# Patient Record
Sex: MALE | Race: AMERICAN INDIAN OR ALASKA NATIVE | NOT HISPANIC OR LATINO | Employment: FULL TIME | URBAN - METROPOLITAN AREA
[De-identification: names, ages, dates, MRNs, and addresses within clinical notes are randomized per-mention and may not be internally consistent; named-entity substitution may affect disease eponyms.]

---

## 2017-08-10 ENCOUNTER — SLEEP CENTER VISIT (OUTPATIENT)
Dept: SLEEP MEDICINE | Facility: MEDICAL CENTER | Age: 62
End: 2017-08-10
Payer: COMMERCIAL

## 2017-08-10 VITALS
WEIGHT: 277 LBS | OXYGEN SATURATION: 92 % | DIASTOLIC BLOOD PRESSURE: 85 MMHG | HEIGHT: 67 IN | BODY MASS INDEX: 43.47 KG/M2 | SYSTOLIC BLOOD PRESSURE: 140 MMHG | RESPIRATION RATE: 15 BRPM | HEART RATE: 62 BPM

## 2017-08-10 DIAGNOSIS — G47.33 OSA (OBSTRUCTIVE SLEEP APNEA): ICD-10-CM

## 2017-08-10 DIAGNOSIS — E78.5 DYSLIPIDEMIA: ICD-10-CM

## 2017-08-10 DIAGNOSIS — I10 ESSENTIAL HYPERTENSION: ICD-10-CM

## 2017-08-10 PROCEDURE — 99213 OFFICE O/P EST LOW 20 MIN: CPT | Performed by: NURSE PRACTITIONER

## 2017-08-10 NOTE — PATIENT INSTRUCTIONS
1. Order for pressure change to [facility]  2. Order for new mask and supplies to [facility]  3. Follow-up in 3 months for compliance download, sooner if necessary  4. Continue routine exercise and diet  5. Adjust humidification on CPAP to comfort and tried Biotene over-the-counter for dry mouth

## 2017-08-10 NOTE — PROGRESS NOTES
Chief Complaint   Patient presents with   • Follow-Up     6 week         HPI: This patient is a 61 y.o. male, who presents for annual follow-up of DEREK. Medical history includes obesity, BMI 43, HTN, HLD, asthma, former smoker, 30 pack year history quit in 2001.    In regards to DEREK, PSG indicates severe sleep apnea with an AHI of 49.7, minimum oxygen saturation of 76 %. He is compliant with CPAP 12 cm H2O. Compliance download over the past 30 days indicates 100 % compliance, average use of 6 hours 10 minutes per night, AHI of 2.4. Patient feels pressures are too low at times. He denies resuscitative gasps or snorts. He would like to try an nasal mask. He requires order for new supplies. He complains of dry mouth. He recently had a visit with his PCP. His BP meds were increased. He says other blood work was unremarkable. Actively trying to lose weight with diet and bicycling. He understands that if he loses weight he may be able to discontinue CPAP. He feels well rested and benefits from CPAP therapy.    In regards to asthma, he uses Asmanex twice a day, rarely requires use of albuterol. Denies URIs. Denies dyspnea, cough or wheeze.    Past Medical History   Diagnosis Date   • Sleep apnea    • Chickenpox    • [place] measles    • Influenza    • Mumps        Social History   Substance Use Topics   • Smoking status: Former Smoker -- 1.00 packs/day for 30 years     Types: Cigarettes     Quit date: 01/01/2001   • Smokeless tobacco: Never Used   • Alcohol Use: No       Family History   Problem Relation Age of Onset   • Sleep Apnea Brother        Current medications as of today   Current Outpatient Prescriptions   Medication Sig Dispense Refill   • MELOXICAM PO Take  by mouth.     • Albuterol Sulfate 108 (90 BASE) MCG/ACT AEROSOL POWDER, BREATH ACTIVATED Inhale  by mouth.     • aspirin 81 MG tablet Take 81 mg by mouth every day.     • losartan (COZAAR) 50 MG Tab Take 100 mg by mouth every day.     • lovastatin (MEVACOR) 40  "MG tablet Take 40 mg by mouth every evening.     • mometasone (ASMANEX) 220 MCG/INH inhaler Inhale 2 Puffs by mouth every day.     • losartan (COZAAR) 25 MG Tab Take 25 mg by mouth every day.     • zolpidem (AMBIEN) 5 MG Tab 1-3 tab PO as needed for sleep during sleep study 3 Tab 0     No current facility-administered medications for this visit.       Allergies: Review of patient's allergies indicates no known allergies.    Blood pressure 140/85, pulse 62, resp. rate 15, height 1.702 m (5' 7\"), weight 125.646 kg (277 lb), SpO2 92 %.      ROS:   Constitutional: Denies fevers, chills, night sweats, weight loss or fatigue  Eyes: Denies pain, discharge/drainage  ENT: Denies tinnitus, hearing loss, sinusitis, hoarseness, epistaxis. Positive dry mouth.  Allergic: Denies Allergic rhinitis or hayfever  Respiratory: Denies cough, wheeze, dyspnea, hemoptysis  Cardiovascular: Denies chest pain, tightness, palpitations, orthopnea or edema  Sleep: See HPI  GI/: Denies heartburn, nausea, vomiting, urinary incontinence, hematuria  Musculoskeletal: Denies back pain, painful joints, sore muscles  Neurological: Denies vertigo or headaches  Skin: Denies rashes, lesions  Psychiatric: Denies depression or anxiety    Physical exam:   Constitutional: Obese, in no acute distress  Eyes: PERRLA  Mouth/Throat: Oropharynx is moist, clear, no lesions, Mallampati 2  Neck: supple, no masses  Respiratory: no intercostal retractions or accessory muscle use   Lungs auscultation: Diminished breath sounds bilateral bases, otherwise clear   Cardiovascular: Regular rate rhythm no murmurs, rubs or gallops  Musculoskeletal: Normal gait, no clubbing or cyanosis  Skin: No rashes or lesions  Neuro: No focal deficit, cranial nerves grossly intact  Psychiatric: Oriented to time, person and place.     Diagnosis:  1. DEREK (obstructive sleep apnea)  DME MASK AND SUPPLIES    DME OTHER   2. Essential hypertension     3. Dyslipidemia     4. BMI 40.0-44.9, adult " (CMS-[facility])         Plan:    1. Order for pressure change to [facility] increased to 14 cm H2O  2. Order for new mask and supplies to [facility], patient would like to try nasal mask with chinstrap  3. Follow-up in 3 months for compliance download, sooner if necessary  4. Continue routine exercise and diet  5. Adjust humidification on CPAP to comfort and tried Biotene over-the-counter for dry mouth

## 2017-08-10 NOTE — MR AVS SNAPSHOT
"        Edwin Hammond   8/10/2017 8:00 AM   Sleep Center Visit   MRN: [id number]    Department:  [facility]   Dept Phone:  [phone]    Description:  Male : 1955   Provider:  LEIGH Zepeda           Reason for Visit     Follow-Up 6 week      Allergies as of 8/10/2017     No Known Allergies      You were diagnosed with     DEREK (obstructive sleep apnea)   [479450]       Essential hypertension   [7352461]       Dyslipidemia   [598163]       BMI 40.0-44.9, adult (CMS-HCC)   [043991]         Vital Signs     Blood Pressure Pulse Respirations Height Weight Body Mass Index    140/85 mmHg 62 15 1.702 m (5' 7\") 125.646 kg (277 lb) 43.37 kg/m2    Oxygen Saturation Smoking Status                92% Former Smoker          Basic Information     Date Of Birth Sex Race Ethnicity Preferred Language    1955 Male  or  Non- English      Your appointments     2017 10:00 AM   Follow UP with LEIGH Zepeda   [facility] (--)    [address] [id number] [phone]              Problem List              ICD-10-CM Priority Class Noted - Resolved    Mild intermittent asthma without complication J45.20   2016 - Present    Dyslipidemia E78.5   2016 - Present    Essential hypertension I10   2016 - Present    DEREK (obstructive sleep apnea) G47.33   2016 - Present    BMI 40.0-44.9, adult (CMS-HCC) Z68.41   8/10/2017 - Present      Health Maintenance        Date Due Completion Dates    IMM DTaP/Tdap/Td Vaccine (1 - Tdap) 1974 ---    IMM PNEUMOCOCCAL 19-64 (ADULT) MEDIUM RISK SERIES (1 of 1 - PPSV23) 1974 ---    COLONOSCOPY 2005 ---    IMM ZOSTER VACCINE 2015 ---    IMM INFLUENZA (1) 2017 ---            Current Immunizations     No immunizations on file.      Below and/or attached are the medications your provider expects you to take. Review all of your " home medications and newly ordered medications with your provider and/or pharmacist. Follow medication instructions as directed by your provider and/or pharmacist. Please keep your medication list with you and share with your provider. Update the information when medications are discontinued, doses are changed, or new medications (including over-the-counter products) are added; and carry medication information at all times in the event of emergency situations     Allergies:  No Known Allergies          Medications  Valid as of: August 10, 2017 -  8:26 AM    Generic Name Brand Name Tablet Size Instructions for use    Albuterol Sulfate (AEROSOL POWDER, BREATH ACTIVATED) Albuterol Sulfate 108 (90 BASE) MCG/ACT Inhale  by mouth.        Aspirin (Tab) aspirin 81 MG Take 81 mg by mouth every day.        Losartan Potassium (Tab) COZAAR 50 MG Take 100 mg by mouth every day.        Losartan Potassium (Tab) COZAAR 25 MG Take 25 mg by mouth every day.        Lovastatin (Tab) MEVACOR 40 MG Take 40 mg by mouth every evening.        Meloxicam   Take  by mouth.        Mometasone Furoate (AEROSOL POWDER, BREATH ACTIVATED) ASMANEX 220 MCG/INH Inhale 2 Puffs by mouth every day.        Zolpidem Tartrate (Tab) AMBIEN 5 MG 1-3 tab PO as needed for sleep during sleep study        .                 Medicines prescribed today were sent to:     None      Medication refill instructions:       If your prescription bottle indicates you have medication refills left, it is not necessary to call your provider’s office. Please contact your pharmacy and they will refill your medication.    If your prescription bottle indicates you do not have any refills left, you may request refills at any time through one of the following ways: The online [employer] system (except Urgent Care), by calling your provider’s office, or by asking your pharmacy to contact your provider’s office with a refill request. Medication refills are processed only during regular  business hours and may not be available until the next business day. Your provider may request additional information or to have a follow-up visit with you prior to refilling your medication.   *Please Note: Medication refills are assigned a new Rx number when refilled electronically. Your pharmacy may indicate that no refills were authorized even though a new prescription for the same medication is available at the pharmacy. Please request the medicine by name with the pharmacy before contacting your provider for a refill.        Instructions    1. Order for pressure change to [facility]  2. Order for new mask and supplies to [facility]  3. Follow-up in 3 months for compliance download, sooner if necessary  4. Continue routine exercise and diet  5. Adjust humidification on CPAP to comfort and tried Biotene over-the-counter for dry mouth          MyChart Status: Patient Declined

## 2017-11-09 ENCOUNTER — APPOINTMENT (OUTPATIENT)
Dept: SLEEP MEDICINE | Facility: MEDICAL CENTER | Age: 62
End: 2017-11-09
Payer: COMMERCIAL

## 2022-01-19 ENCOUNTER — NON-PROVIDER VISIT (OUTPATIENT)
Dept: URGENT CARE | Facility: PHYSICIAN GROUP | Age: 67
End: 2022-01-19
Payer: COMMERCIAL

## 2022-01-19 DIAGNOSIS — Z02.1 PRE-EMPLOYMENT DRUG SCREENING: ICD-10-CM

## 2022-01-19 LAB
AMP AMPHETAMINE: NORMAL
COC COCAINE: NORMAL
INT CON NEG: NORMAL
INT CON POS: NORMAL
MET METHAMPHETAMINES: NORMAL
OPI OPIATES: NORMAL
PCP PHENCYCLIDINE: NORMAL
POC DRUG COMMENT 753798-OCCUPATIONAL HEALTH: NORMAL
THC: NORMAL

## 2022-01-19 PROCEDURE — 80305 DRUG TEST PRSMV DIR OPT OBS: CPT | Performed by: FAMILY MEDICINE

## 2022-04-19 ENCOUNTER — OFFICE VISIT (OUTPATIENT)
Dept: SLEEP MEDICINE | Facility: MEDICAL CENTER | Age: 67
End: 2022-04-19
Payer: MEDICARE

## 2022-04-19 VITALS
HEIGHT: 68 IN | BODY MASS INDEX: 38.04 KG/M2 | SYSTOLIC BLOOD PRESSURE: 132 MMHG | HEART RATE: 68 BPM | DIASTOLIC BLOOD PRESSURE: 84 MMHG | RESPIRATION RATE: 16 BRPM | OXYGEN SATURATION: 96 % | WEIGHT: 251 LBS

## 2022-04-19 DIAGNOSIS — G47.33 OSA (OBSTRUCTIVE SLEEP APNEA): ICD-10-CM

## 2022-04-19 PROCEDURE — 99203 OFFICE O/P NEW LOW 30 MIN: CPT | Performed by: FAMILY MEDICINE

## 2022-04-19 RX ORDER — AMLODIPINE BESYLATE 5 MG/1
10 TABLET ORAL DAILY
COMMUNITY

## 2022-04-19 ASSESSMENT — PATIENT HEALTH QUESTIONNAIRE - PHQ9
CLINICAL INTERPRETATION OF PHQ2 SCORE: 1
5. POOR APPETITE OR OVEREATING: 0 - NOT AT ALL
SUM OF ALL RESPONSES TO PHQ QUESTIONS 1-9: 7

## 2022-04-19 NOTE — PROGRESS NOTES
"  [facility]  Consult Note     Date: 4/19/2022 / Time: 9:19 AM    Patient ID:   Name:             Edwin Hammond   YOB: 1955  Age:                 66 y.o.  male   MRN:               [id number]      Thank you for requesting a sleep medicine consultation on Edwin Hammond at the sleep center.He presents today with the chief complaints of DEREK and to establish as a new pt. Pt was diagnosed with DEREK on 6/7/2016 and was started on a CPAP.  However he has been off of the CPAP for last 2 years due to dry mouth.The initial presenting symptoms were loud snoring and poor quality of sleep. His chief Complaint today is history of sleep apnea, loud snoring, restless leg and poor sleep quality. He is referred by  for evaluation and treatment of sleep disorder breathing .     HISTORY OF PRESENT ILLNESS:       At night,  Edwin Hammond goes to bed around 8 pm on weekdays and around 9 pm on the weekends. He gets out of bed at 6:30 am on weekdays and at 9-10 am on the weekends. He averages about 6 hrs of sleep on a good night and 5 hrs on a bad night.  He falls asleep within few minutes. He wakes up about 2-3 times during the night due to bathroom use and on average It takes him couple min to fall back asleep.     He is aware of snoring but unsure about breathing pauses/gasping or choking in sleep.  He  denies any symptoms of restless legs syndrome such as an \"urge to move\"  He  legs in the evening or bedtime. He  denies any symptoms of narcolepsy such as sleep paralysis or cataplexy, or any symptoms to suggest parasomnias such as sleep walking or acting out of dreams. He  has not used any medications for the sleep problem.    Overall, he doesnot finds his sleep refreshing. In terms of  excessive daytime sleepiness,  He complains of sleepiness while  at work, while reading or watching TV and occasionally while driving. [place] sleepiness scale score is 16/24.He does take regular naps. The " naps are usually 60-90 min long.He drinks about 1 caffeinated beverages per day.    Medical history includes obesity, BMI 43, HTN, HLD, asthma, former smoker, 30 pack year history quit in 2001.  Based on his previous split-night study he had Severe DEREK, with overall AHI:49.7, associated with desaturations for 5% of the night, to a keo of 76%. REM rebound noted on CPAP, with successful titration at 12 cm H20, with resultant AHI:0 and normal oximetry.  He was started on CPAP 12 cm however he has been off of therapy for last 2 years.   Sleep  REVIEW OF SYSTEMS:       Constitutional: Denies fevers, Denies weight changes  Eyes: Denies changes in vision, no eye pain  Ears/Nose/Throat/Mouth: Denies nasal congestion or sore throat   Cardiovascular: Denies chest pain or palpitations   Respiratory: Denies shortness of breath , Denies cough  Gastrointestinal/Hepatic: Denies abdominal pain, nausea, vomiting, diarrhea, constipation or GI bleeding   Genitourinary: Denies bladder dysfunction, dysuria or frequency  Musculoskeletal/Rheum: Denies  joint pain and swelling   Skin/Breast: Denies rash  Neurological: Denies headache, confusion, memory loss or focal weakness/parasthesias  Psychiatric: denies mood disorder     Comprehensive review of systems form is reviewed with the patient and is attached in the EMR.     PMH:  has a past medical history of Chickenpox, [place] measles, Influenza, Mumps, and Sleep apnea.  MEDS:   Current Outpatient Medications:   •  AMLODIPINE BESYLATE PO, Take  by mouth., Disp: , Rfl:   •  CYCLOBENZAPRINE HCL PO, Take  by mouth., Disp: , Rfl:   •  Albuterol Sulfate 108 (90 BASE) MCG/ACT AEROSOL POWDER, BREATH ACTIVATED, Inhale  by mouth., Disp: , Rfl:   •  aspirin 81 MG tablet, Take 81 mg by mouth every day., Disp: , Rfl:   •  losartan (COZAAR) 50 MG Tab, Take 100 mg by mouth every day., Disp: , Rfl:   •  lovastatin (MEVACOR) 40 MG tablet, Take 40 mg by mouth every evening., Disp: , Rfl:   •  mometasone  "(ASMANEX) 220 MCG/INH inhaler, Inhale 2 Puffs by mouth every day., Disp: , Rfl:   •  MELOXICAM PO, Take  by mouth., Disp: , Rfl:   •  losartan (COZAAR) 25 MG Tab, Take 25 mg by mouth every day., Disp: , Rfl:   •  zolpidem (AMBIEN) 5 MG Tab, 1-3 tab PO as needed for sleep during sleep study, Disp: 3 Tab, Rfl: 0  ALLERGIES: No Known Allergies  SURGHX:   Past Surgical History:   Procedure Laterality Date   • OTHER  [id number]    Mastoidectomy left ear     SOCHX:  reports that he quit smoking about 21 years ago. His smoking use included cigarettes. He has a 30.00 pack-year smoking history. He has never used smokeless tobacco. He reports that he does not drink alcohol and does not use drugs.  FH:   Family History   Problem Relation Age of Onset   • Sleep Apnea Brother        Physical Exam:  Vitals/ General Appearance:   Weight/BMI: Body mass index is 38.16 kg/m².  /84 (BP Location: Left arm, Patient Position: Sitting, BP Cuff Size: Adult)   Pulse 68   Resp 16   Ht 1.727 m (5' 8\")   Wt 114 kg (251 lb)   SpO2 96%   Vitals:    04/19/22 0900   BP: 132/84   BP Location: Left arm   Patient Position: Sitting   BP Cuff Size: Adult   Pulse: 68   Resp: 16   SpO2: 96%   Weight: 114 kg (251 lb)   Height: 1.727 m (5' 8\")           Constitutional: Alert, no distress, well-groomed.  Skin: No rashes in visible areas.  Eye: Round. Conjunctiva clear, lids normal. No icterus.   ENMT: Lips pink without lesions, good dentition, moist mucous membranes. Phonation normal.  Neck: No masses, no thyromegaly. Moves freely without pain.  CV: Pulse as reported by patient  Respiratory: Unlabored respiratory effort, no cough or audible wheeze  Psych: Alert and oriented x3, normal affect and mood.   INVESTIGATIONS:       ASSESSMENT AND PLAN     1.Obstructive Sleep Apnea      The pathophysiology of sleep anea and the increased risk of cardiovascular morbidity from untreated sleep apnea is discussed in detail with the patient. He is urged to " avoid supine sleep, weight gain and alcoholic beverages since all of these can worsen sleep apnea. He is cautioned against drowsy driving. If He feels sleepy while driving, He must pull over for a break/nap, rather than persist on the road, in the interest of He own safety and that of others on the road.   Plan   - Continue CPAP 12 cm with FFM and a chin strap.    - New CPAP is ordered today   - F/u in 8-10 weeks to assess the efficiacy of recommended pressure    -SS was reviewed and discussed with the pt   - compliance was reinforced     2. Regarding treatment of other past medical problems and general health maintenance,  He is urged to follow up with PCP.

## 2022-10-04 NOTE — PROGRESS NOTES
[facility] Follow-up Visit    Date of Visit: 10/18/2022     CC: Follow-up for DEREK management      HPI:  Edwin Hammond is a very pleasant 66 y.o. year old male former smoker (30 pack-years, quit in 2001), with a PMHx of DEREK, elevated BMI, asthma, HTN, DSL  who presented to the [facility] for a regular follow up. Last seen in the office on 4/19/2022 with Dr. Modi.     Patient presents for compliance check.  He states since starting CPAP his sleep has become much more restful.  He also states that he was recently started on antidepressants for anxiety and had a dose increase, which is significantly helped with his issues falling asleep.  He denies any morning headaches, daytime/driving drowsy, issues falling asleep, snoring, gasping, apneas, palpitations, dry mouth, aerophagia, mask leak, skin irritation, or any symptoms of RLS, narcolepsy or any nightmares, sleep walking or acting out of dreams.  He is going to bed at 8 PM and waking up between 4-5 AM.  He awakens once or twice at night to use the bathroom does not have any issues falling back asleep.  He does take a nap every day for about an hour when he gets home from work.  [facility]  He also states that he has travels on a motorcycle bike 1-2 times a month and is unable to take his CPAP, which is why his compliance is low.    DME provider: [facility]  Device: ResMed air sense 11 auto  Settings: CPAP 12 CWP  When: May 2022  Mask: Nasal pillows  Chin strap: Yes    Cleaning regimen: Couple times a week with soap and water.    Compliance:  Compliance data reviewed showing 73% usage > 4hours in last 30 days. Average AHI 1.1 events/hour. 95% leaks 26.8 l/min. Patient continues to use and benefit from machine.      Sleep History:  PSG titration 6/19/2022-        Patient Active Problem List    Diagnosis Date Noted    BMI 40.0-44.9, adult (HCC) 08/10/2017    DEREK (obstructive sleep apnea) 06/17/2016    Mild intermittent  asthma without complication 2016    Dyslipidemia 2016    Essential hypertension 2016     Past Medical History:   Diagnosis Date    Chickenpox     Faroese measles     Influenza     Mumps     Sleep apnea       Past Surgical History:   Procedure Laterality Date    OTHER  [id number]    Mastoidectomy left ear     Family History   Problem Relation Age of Onset    Sleep Apnea Brother      Social History     Socioeconomic History    Marital status:      Spouse name: Not on file    Number of children: Not on file    Years of education: Not on file    Highest education level: Not on file   Occupational History    Not on file   Tobacco Use    Smoking status: Former     Packs/day: 1.00     Years: 30.00     Pack years: 30.00     Types: Cigarettes     Quit date: 2001     Years since quittin.8    Smokeless tobacco: Never   Vaping Use    Vaping Use: Never used   Substance and Sexual Activity    Alcohol use: No    Drug use: No    Sexual activity: Not on file   Other Topics Concern    Not on file   Social History Narrative    Not on file     Social Determinants of Health     Financial Resource Strain: Not on file   Food Insecurity: Not on file   Transportation Needs: Not on file   Physical Activity: Not on file   Stress: Not on file   Social Connections: Not on file   Intimate Partner Violence: Not on file   Housing Stability: Not on file     Current Outpatient Medications   Medication Sig Dispense Refill    citalopram (CELEXA) 20 MG Tab Take 20 mg by mouth every day.      AMLODIPINE BESYLATE PO Take  by mouth.      Albuterol Sulfate 108 (90 BASE) MCG/ACT AEROSOL POWDER, BREATH ACTIVATED Inhale  by mouth.      aspirin 81 MG tablet Take 81 mg by mouth every day.      losartan (COZAAR) 50 MG Tab Take 100 mg by mouth every day.      lovastatin (MEVACOR) 40 MG tablet Take 40 mg by mouth every evening.      mometasone (ASMANEX) 220 MCG/INH inhaler Inhale 2 Puffs by mouth every day.      CYCLOBENZAPRINE HCL  "PO Take  by mouth. (Patient not taking: Reported on 10/18/2022)      MELOXICAM PO Take  by mouth.      losartan (COZAAR) 25 MG Tab Take 25 mg by mouth every day.      zolpidem (AMBIEN) 5 MG Tab 1-3 tab PO as needed for sleep during sleep study 3 Tab 0     No current facility-administered medications for this visit.      ALLERGIES: Patient has no known allergies.    ROS:  Constitutional: Denies fever, chills, sweats,  weight loss, fatigue  Cardiovascular: Denies chest pain, tightness, palpitations, swelling in legs/feet  Respiratory: Denies shortness of breath, cough, sputum, wheezing, painful breathing   Sleep: per HPI  Gastrointestinal: Denies  difficulty swallowing, nausea, abdominal pain, diarrhea, constipation, heartburn.  Musculoskeletal: Denies painful joints, sore muscles,       PHYSICAL EXAM:  /80 (BP Location: Right arm, Patient Position: Sitting, BP Cuff Size: Adult)   Pulse 60   Resp 16   Ht 1.727 m (5' 8\")   Wt 108 kg (238 lb)   SpO2 97%   BMI 36.19 kg/m²   Appearance: Well-nourished, well-developed, no acute distress  Eyes:  No scleral icterus , EOMI  ENMT: No redness of the oropharynx  Lung auscultation:  No wheezes rhonchi rubs or rales  Cardiac: No murmurs, rubs, or gallops; regular rhythm, normal rate; no edema  Musculoskeletal:  Grossly normal; gait and station normal; digits and nails normal  Skin:  No rashes, petechiae, cyanosis  Neurologic: without focal signs; oriented to person, time, place, and purpose; judgement intact  Psychiatric:  No depression, anxiety, agitation    Assessment and Plan:    The medical record was reviewed.    Diagnostic and titration nocturnal polysomnogram's, home sleep apnea tests, continuous nocturnal oximetry results, multiple sleep latency tests, and compliance reports reviewed.    Problem List Items Addressed This Visit       DEREK (obstructive sleep apnea)     Sleep Apnea:    The pathophysiology of sleep anea and the increased risk of cardiovascular " morbidity from untreated sleep apnea is discussed in detail with the patient.  Urged to avoid supine sleep, weight gain and alcoholic beverages since all of these can worsen sleep apnea. Cautioned against drowsy driving. If feeling sleepy while driving, pull over for a break/nap, rather than persist on the road, in the interest of own safety and that of others on the road.    Compliance download was reviewed and discussed with the patient.     - Order placed for mask and supplies   - Compliance was reinforced  - Advised patient to reach out via [employer] [facility] if any questions or concerns should arise.   - Equipment replacement schedule:  Mask cushion every month  Nasal pillows 2 times per month  Mask every 6 months  Head gear every 6 months  Tubing every 3 months  Ultra-fine filters 2 times per month  Foam filter every 6 months  Humidifier chamber every 6 months  Chin strap every 6 months    Has been advised to continue the current CPAP at 12 CWP, clean equipment frequently, and get new mask and supplies as allowed by insurance and DME. Recommend an earlier appointment, if significant treatment barriers develop.    The risks of untreated sleep apnea were discussed with the patient at length. Patients with DEREK are at increased risk of cardiovascular disease including coronary artery disease, systemic arterial hypertension, pulmonary arterial hypertension, cardiac arrythmias, and stroke. The patient was advised to avoid driving a motor vehicle when drowsy.    Positive airway pressure will favorably impact many of the adverse conditions and effects provoked by DEREK.         Relevant Orders    DME Mask and Supplies       Have advised the patient to follow up with the appropriate healthcare practitioners for all other medical problems and issues.    Return in about 1 year (around 10/18/2023), or if symptoms worsen or fail to improve.      Please note portions of this record was created using voice recognition software. I have  made every reasonable attempt to correct obvious errors, but I expect that there are errors of grammar and possibly content I did not discover before finalizing the note.    Time spent in record review prior to patient arrival, reviewing results, and in face-to-face encounter totaled 21 min.  __________  UDAY Melgoza  Pulmonary & Sleep Medicine  [facility]

## 2022-10-18 ENCOUNTER — OFFICE VISIT (OUTPATIENT)
Dept: SLEEP MEDICINE | Facility: MEDICAL CENTER | Age: 67
End: 2022-10-18
Payer: MEDICARE

## 2022-10-18 VITALS
SYSTOLIC BLOOD PRESSURE: 126 MMHG | RESPIRATION RATE: 16 BRPM | BODY MASS INDEX: 36.07 KG/M2 | OXYGEN SATURATION: 97 % | DIASTOLIC BLOOD PRESSURE: 80 MMHG | HEIGHT: 68 IN | HEART RATE: 60 BPM | WEIGHT: 238 LBS

## 2022-10-18 DIAGNOSIS — G47.33 OSA (OBSTRUCTIVE SLEEP APNEA): ICD-10-CM

## 2022-10-18 PROCEDURE — 99213 OFFICE O/P EST LOW 20 MIN: CPT

## 2022-10-18 RX ORDER — CITALOPRAM 20 MG/1
20 TABLET ORAL DAILY
Status: ON HOLD | COMMUNITY
End: 2023-12-06

## 2022-10-18 NOTE — ASSESSMENT & PLAN NOTE
Sleep Apnea:    The pathophysiology of sleep anea and the increased risk of cardiovascular morbidity from untreated sleep apnea is discussed in detail with the patient.  Urged to avoid supine sleep, weight gain and alcoholic beverages since all of these can worsen sleep apnea. Cautioned against drowsy driving. If feeling sleepy while driving, pull over for a break/nap, rather than persist on the road, in the interest of own safety and that of others on the road.    Compliance download was reviewed and discussed with the patient.     - Order placed for mask and supplies   - Compliance was reinforced  - Advised patient to reach out via [employer] if any questions or concerns should arise.   - Equipment replacement schedule:  Mask cushion every month  Nasal pillows 2 times per month  Mask every 6 months  Head gear every 6 months  Tubing every 3 months  Ultra-fine filters 2 times per month  Foam filter every 6 months  Humidifier chamber every 6 months  Chin strap every 6 months    Has been advised to continue the current CPAP at 12 CWP, clean equipment frequently, and get new mask and supplies as allowed by insurance and DME. Recommend an earlier appointment, if significant treatment barriers develop.    The risks of untreated sleep apnea were discussed with the patient at length. Patients with DEREK are at increased risk of cardiovascular disease including coronary artery disease, systemic arterial hypertension, pulmonary arterial hypertension, cardiac arrythmias, and stroke. The patient was advised to avoid driving a motor vehicle when drowsy.    Positive airway pressure will favorably impact many of the adverse conditions and effects provoked by DEREK.

## 2023-10-20 ENCOUNTER — OFFICE VISIT (OUTPATIENT)
Dept: SLEEP MEDICINE | Facility: MEDICAL CENTER | Age: 68
End: 2023-10-20
Payer: MEDICARE

## 2023-10-20 VITALS
SYSTOLIC BLOOD PRESSURE: 124 MMHG | HEIGHT: 68 IN | DIASTOLIC BLOOD PRESSURE: 72 MMHG | HEART RATE: 72 BPM | OXYGEN SATURATION: 95 % | RESPIRATION RATE: 16 BRPM | WEIGHT: 248 LBS | BODY MASS INDEX: 37.59 KG/M2

## 2023-10-20 DIAGNOSIS — G47.33 OSA ON CPAP: ICD-10-CM

## 2023-10-20 DIAGNOSIS — G47.33 OSA (OBSTRUCTIVE SLEEP APNEA): ICD-10-CM

## 2023-10-20 PROCEDURE — 99213 OFFICE O/P EST LOW 20 MIN: CPT

## 2023-10-20 PROCEDURE — 3074F SYST BP LT 130 MM HG: CPT

## 2023-10-20 PROCEDURE — 99212 OFFICE O/P EST SF 10 MIN: CPT

## 2023-10-20 PROCEDURE — 3078F DIAST BP <80 MM HG: CPT

## 2023-10-20 NOTE — PATIENT INSTRUCTIONS
Equipment replacement schedule:  Mask cushion every month  Nasal pillows 2 times per month  Mask every 6 months  Head gear every 6 months  Tubing every 3 months  Ultra-fine filters 2 times per month  Foam filter every 6 months  Humidifier chamber every 6 months  Chin strap every 6 months

## 2023-10-20 NOTE — ASSESSMENT & PLAN NOTE
Sleep Apnea:    The pathophysiology of sleep anea and the increased risk of cardiovascular morbidity from untreated sleep apnea is discussed in detail with the patient.  Urged to avoid supine sleep, weight gain and alcoholic beverages since all of these can worsen sleep apnea. Cautioned against drowsy driving. If feeling sleepy while driving, pull over for a break/nap, rather than persist on the road, in the interest of own safety and that of others on the road.    Compliance download was reviewed and discussed with the patient.   Patient's AHI is well controlled.  He does have low compliance and was counseled on increasing his compliance.  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    - Order placed for mask and supplies to [facility] [place]   - Compliance was reinforced  - Recommended the patient against the use of Ozone , such as SoClean  - Clean supplies a couple times a week with dish soap and water and air dry  - Recommended the patient change out supplies as recommended for best mask fit and usage of the machine  - Advised patient to reach out via [employer] if any questions or concerns should arise.   - Equipment replacement schedule:  Mask cushion every month  Nasal pillows 2 times per month  Mask every 6 months  Head gear every 6 months  Tubing every 3 months  Ultra-fine filters 2 times per month  Foam filter every 6 months  Humidifier chamber every 6 months  Chin strap every 6 months    Has been advised to continue the current CPAP, clean equipment frequently, and get new mask and supplies as allowed by insurance and DME. Recommend an earlier appointment, if significant treatment barriers develop.    The risks of untreated sleep apnea were discussed with the patient at length. Patients with sleep apnea are at increased risk of cardiovascular disease including coronary artery disease, systemic arterial hypertension, pulmonary arterial hypertension, cardiac  arrythmias, and stroke. The patient was advised to avoid driving a motor vehicle when drowsy.    Positive airway pressure will favorably impact many of the adverse conditions and effects provoked by sleep apnea.

## 2023-12-05 ENCOUNTER — APPOINTMENT (OUTPATIENT)
Dept: RADIOLOGY | Facility: MEDICAL CENTER | Age: 68
End: 2023-12-05
Payer: MEDICARE

## 2023-12-05 ENCOUNTER — HOSPITAL ENCOUNTER (INPATIENT)
Facility: MEDICAL CENTER | Age: 68
LOS: 8 days | DRG: 232 | End: 2023-12-13
Attending: INTERNAL MEDICINE | Admitting: INTERNAL MEDICINE
Payer: MEDICARE

## 2023-12-05 DIAGNOSIS — Z95.1 S/P CABG X 3: ICD-10-CM

## 2023-12-05 DIAGNOSIS — G89.18 ACUTE POST-OPERATIVE PAIN: ICD-10-CM

## 2023-12-05 DIAGNOSIS — I25.810 CORONARY ARTERY DISEASE INVOLVING CORONARY BYPASS GRAFT OF NATIVE HEART, UNSPECIFIED WHETHER ANGINA PRESENT: ICD-10-CM

## 2023-12-05 DIAGNOSIS — I48.0 PAROXYSMAL A-FIB (HCC): ICD-10-CM

## 2023-12-05 DIAGNOSIS — I21.4 NSTEMI (NON-ST ELEVATED MYOCARDIAL INFARCTION) (HCC): ICD-10-CM

## 2023-12-05 PROBLEM — R05.9 COUGH: Status: ACTIVE | Noted: 2023-12-05

## 2023-12-05 LAB
BASOPHILS # BLD AUTO: 0.7 % (ref 0–1.8)
BASOPHILS # BLD: 0.06 K/UL (ref 0–0.12)
EOSINOPHIL # BLD AUTO: 0.15 K/UL (ref 0–0.51)
EOSINOPHIL NFR BLD: 1.8 % (ref 0–6.9)
ERYTHROCYTE [DISTWIDTH] IN BLOOD BY AUTOMATED COUNT: 44.9 FL (ref 35.9–50)
HCT VFR BLD AUTO: 51.7 % (ref 42–52)
HGB BLD-MCNC: 17.2 G/DL (ref 14–18)
IMM GRANULOCYTES # BLD AUTO: 0.02 K/UL (ref 0–0.11)
IMM GRANULOCYTES NFR BLD AUTO: 0.2 % (ref 0–0.9)
LYMPHOCYTES # BLD AUTO: 2.7 K/UL (ref 1–4.8)
LYMPHOCYTES NFR BLD: 31.8 % (ref 22–41)
MCH RBC QN AUTO: 30.6 PG (ref 27–33)
MCHC RBC AUTO-ENTMCNC: 33.3 G/DL (ref 32.3–36.5)
MCV RBC AUTO: 91.8 FL (ref 81.4–97.8)
MONOCYTES # BLD AUTO: 0.76 K/UL (ref 0–0.85)
MONOCYTES NFR BLD AUTO: 9 % (ref 0–13.4)
NEUTROPHILS # BLD AUTO: 4.79 K/UL (ref 1.82–7.42)
NEUTROPHILS NFR BLD: 56.5 % (ref 44–72)
NRBC # BLD AUTO: 0 K/UL
NRBC BLD-RTO: 0 /100 WBC (ref 0–0.2)
PLATELET # BLD AUTO: 289 K/UL (ref 164–446)
PMV BLD AUTO: 9 FL (ref 9–12.9)
RBC # BLD AUTO: 5.63 M/UL (ref 4.7–6.1)
WBC # BLD AUTO: 8.5 K/UL (ref 4.8–10.8)

## 2023-12-05 PROCEDURE — 85610 PROTHROMBIN TIME: CPT

## 2023-12-05 PROCEDURE — 93005 ELECTROCARDIOGRAM TRACING: CPT | Performed by: INTERNAL MEDICINE

## 2023-12-05 PROCEDURE — C9803 HOPD COVID-19 SPEC COLLECT: HCPCS | Performed by: INTERNAL MEDICINE

## 2023-12-05 PROCEDURE — 85520 HEPARIN ASSAY: CPT

## 2023-12-05 PROCEDURE — 85025 COMPLETE CBC W/AUTO DIFF WBC: CPT

## 2023-12-05 PROCEDURE — 80061 LIPID PANEL: CPT

## 2023-12-05 PROCEDURE — 36415 COLL VENOUS BLD VENIPUNCTURE: CPT

## 2023-12-05 PROCEDURE — 770020 HCHG ROOM/CARE - TELE (206)

## 2023-12-05 PROCEDURE — 0241U HCHG SARS-COV-2 COVID-19 NFCT DS RESP RNA 4 TRGT MIC: CPT

## 2023-12-05 PROCEDURE — 80053 COMPREHEN METABOLIC PANEL: CPT

## 2023-12-05 PROCEDURE — 84484 ASSAY OF TROPONIN QUANT: CPT

## 2023-12-05 PROCEDURE — 85730 THROMBOPLASTIN TIME PARTIAL: CPT

## 2023-12-05 PROCEDURE — 99223 1ST HOSP IP/OBS HIGH 75: CPT | Mod: AI | Performed by: INTERNAL MEDICINE

## 2023-12-05 RX ORDER — OXYCODONE HYDROCHLORIDE 10 MG/1
10 TABLET ORAL
Status: DISCONTINUED | OUTPATIENT
Start: 2023-12-05 | End: 2023-12-08

## 2023-12-05 RX ORDER — HEPARIN SODIUM 5000 [USP'U]/100ML
0-30 INJECTION, SOLUTION INTRAVENOUS CONTINUOUS
Status: DISCONTINUED | OUTPATIENT
Start: 2023-12-05 | End: 2023-12-05

## 2023-12-05 RX ORDER — HEPARIN SODIUM 1000 [USP'U]/ML
2000 INJECTION, SOLUTION INTRAVENOUS; SUBCUTANEOUS PRN
Status: DISCONTINUED | OUTPATIENT
Start: 2023-12-05 | End: 2023-12-08

## 2023-12-05 RX ORDER — ATORVASTATIN CALCIUM 80 MG/1
80 TABLET, FILM COATED ORAL EVERY EVENING
Status: DISCONTINUED | OUTPATIENT
Start: 2023-12-06 | End: 2023-12-13 | Stop reason: HOSPADM

## 2023-12-05 RX ORDER — OXYCODONE HYDROCHLORIDE 5 MG/1
5 TABLET ORAL
Status: DISCONTINUED | OUTPATIENT
Start: 2023-12-05 | End: 2023-12-08

## 2023-12-05 RX ORDER — MORPHINE SULFATE 4 MG/ML
2-4 INJECTION INTRAVENOUS
Status: DISCONTINUED | OUTPATIENT
Start: 2023-12-05 | End: 2023-12-08

## 2023-12-05 RX ORDER — HEPARIN SODIUM 5000 [USP'U]/100ML
0-30 INJECTION, SOLUTION INTRAVENOUS CONTINUOUS
Status: DISPENSED | OUTPATIENT
Start: 2023-12-06 | End: 2023-12-08

## 2023-12-05 RX ORDER — FLUTICASONE PROPIONATE 110 UG/1
1 AEROSOL, METERED RESPIRATORY (INHALATION)
Status: DISCONTINUED | OUTPATIENT
Start: 2023-12-06 | End: 2023-12-10

## 2023-12-05 RX ORDER — BISACODYL 10 MG
10 SUPPOSITORY, RECTAL RECTAL
Status: DISCONTINUED | OUTPATIENT
Start: 2023-12-05 | End: 2023-12-08

## 2023-12-05 RX ORDER — MORPHINE SULFATE 4 MG/ML
4 INJECTION INTRAVENOUS
Status: DISCONTINUED | OUTPATIENT
Start: 2023-12-05 | End: 2023-12-08

## 2023-12-05 RX ORDER — ONDANSETRON 2 MG/ML
4 INJECTION INTRAMUSCULAR; INTRAVENOUS EVERY 4 HOURS PRN
Status: DISCONTINUED | OUTPATIENT
Start: 2023-12-05 | End: 2023-12-08

## 2023-12-05 RX ORDER — IPRATROPIUM BROMIDE AND ALBUTEROL SULFATE 2.5; .5 MG/3ML; MG/3ML
3 SOLUTION RESPIRATORY (INHALATION)
Status: DISCONTINUED | OUTPATIENT
Start: 2023-12-05 | End: 2023-12-08

## 2023-12-05 RX ORDER — CITALOPRAM 40 MG/1
30 TABLET ORAL DAILY
Status: DISCONTINUED | OUTPATIENT
Start: 2023-12-06 | End: 2023-12-13 | Stop reason: HOSPADM

## 2023-12-05 RX ORDER — HEPARIN SODIUM 1000 [USP'U]/ML
40 INJECTION, SOLUTION INTRAVENOUS; SUBCUTANEOUS PRN
Status: DISCONTINUED | OUTPATIENT
Start: 2023-12-05 | End: 2023-12-05

## 2023-12-05 RX ORDER — AMLODIPINE BESYLATE 5 MG/1
5 TABLET ORAL
Status: DISCONTINUED | OUTPATIENT
Start: 2023-12-06 | End: 2023-12-06

## 2023-12-05 RX ORDER — ONDANSETRON 4 MG/1
4 TABLET, ORALLY DISINTEGRATING ORAL EVERY 4 HOURS PRN
Status: DISCONTINUED | OUTPATIENT
Start: 2023-12-05 | End: 2023-12-08

## 2023-12-05 RX ORDER — GUAIFENESIN/DEXTROMETHORPHAN 100-10MG/5
5 SYRUP ORAL EVERY 6 HOURS PRN
Status: DISCONTINUED | OUTPATIENT
Start: 2023-12-05 | End: 2023-12-08

## 2023-12-05 RX ORDER — NITROGLYCERIN 0.4 MG/1
0.4 TABLET SUBLINGUAL
Status: DISCONTINUED | OUTPATIENT
Start: 2023-12-05 | End: 2023-12-08

## 2023-12-05 RX ORDER — POLYETHYLENE GLYCOL 3350 17 G/17G
1 POWDER, FOR SOLUTION ORAL
Status: DISCONTINUED | OUTPATIENT
Start: 2023-12-05 | End: 2023-12-08

## 2023-12-05 RX ORDER — AMOXICILLIN 250 MG
2 CAPSULE ORAL 2 TIMES DAILY
Status: DISCONTINUED | OUTPATIENT
Start: 2023-12-05 | End: 2023-12-08

## 2023-12-05 RX ORDER — ACETAMINOPHEN 325 MG/1
650 TABLET ORAL EVERY 6 HOURS PRN
Status: DISCONTINUED | OUTPATIENT
Start: 2023-12-05 | End: 2023-12-08

## 2023-12-05 RX ORDER — LABETALOL HYDROCHLORIDE 5 MG/ML
10 INJECTION, SOLUTION INTRAVENOUS EVERY 4 HOURS PRN
Status: DISCONTINUED | OUTPATIENT
Start: 2023-12-05 | End: 2023-12-08

## 2023-12-05 RX ORDER — ASPIRIN 81 MG/1
81 TABLET ORAL DAILY
Status: DISCONTINUED | OUTPATIENT
Start: 2023-12-06 | End: 2023-12-08

## 2023-12-05 ASSESSMENT — PATIENT HEALTH QUESTIONNAIRE - PHQ9
SUM OF ALL RESPONSES TO PHQ9 QUESTIONS 1 AND 2: 0
1. LITTLE INTEREST OR PLEASURE IN DOING THINGS: NOT AT ALL
2. FEELING DOWN, DEPRESSED, IRRITABLE, OR HOPELESS: NOT AT ALL

## 2023-12-05 ASSESSMENT — COGNITIVE AND FUNCTIONAL STATUS - GENERAL
SUGGESTED CMS G CODE MODIFIER DAILY ACTIVITY: CH
SUGGESTED CMS G CODE MODIFIER MOBILITY: CH
MOBILITY SCORE: 24
DAILY ACTIVITIY SCORE: 24

## 2023-12-05 ASSESSMENT — ENCOUNTER SYMPTOMS
CHILLS: 0
PALPITATIONS: 0
NAUSEA: 0
BLURRED VISION: 0
SHORTNESS OF BREATH: 1
TREMORS: 0
PHOTOPHOBIA: 0
DOUBLE VISION: 0
FOCAL WEAKNESS: 0
POLYDIPSIA: 0
WEIGHT LOSS: 0
HEMOPTYSIS: 0
NERVOUS/ANXIOUS: 0
COUGH: 1
FEVER: 0
NECK PAIN: 0
SPEECH CHANGE: 0
HEADACHES: 0
VOMITING: 0
SPUTUM PRODUCTION: 0
HEARTBURN: 0
HALLUCINATIONS: 0
BRUISES/BLEEDS EASILY: 0
BACK PAIN: 0
ORTHOPNEA: 0
FLANK PAIN: 0

## 2023-12-05 ASSESSMENT — LIFESTYLE VARIABLES
TOTAL SCORE: 0
EVER HAD A DRINK FIRST THING IN THE MORNING TO STEADY YOUR NERVES TO GET RID OF A HANGOVER: NO
ON A TYPICAL DAY WHEN YOU DRINK ALCOHOL HOW MANY DRINKS DO YOU HAVE: 0
TOTAL SCORE: 0
EVER FELT BAD OR GUILTY ABOUT YOUR DRINKING: NO
HAVE YOU EVER FELT YOU SHOULD CUT DOWN ON YOUR DRINKING: NO
TOTAL SCORE: 0
HAVE PEOPLE ANNOYED YOU BY CRITICIZING YOUR DRINKING: NO
HOW MANY TIMES IN THE PAST YEAR HAVE YOU HAD 5 OR MORE DRINKS IN A DAY: 0
AVERAGE NUMBER OF DAYS PER WEEK YOU HAVE A DRINK CONTAINING ALCOHOL: 0
SUBSTANCE_ABUSE: 0
ALCOHOL_USE: NO
CONSUMPTION TOTAL: NEGATIVE

## 2023-12-05 ASSESSMENT — PAIN DESCRIPTION - PAIN TYPE: TYPE: ACUTE PAIN

## 2023-12-06 ENCOUNTER — APPOINTMENT (OUTPATIENT)
Dept: CARDIOLOGY | Facility: MEDICAL CENTER | Age: 68
DRG: 232 | End: 2023-12-06
Attending: INTERNAL MEDICINE
Payer: MEDICARE

## 2023-12-06 LAB
ACT BLD: 147 SEC (ref 74–137)
ALBUMIN SERPL BCP-MCNC: 3.8 G/DL (ref 3.2–4.9)
ALBUMIN/GLOB SERPL: 1.2 G/DL
ALP SERPL-CCNC: 63 U/L (ref 30–99)
ALT SERPL-CCNC: 19 U/L (ref 2–50)
AMPHET UR QL SCN: NEGATIVE
ANION GAP SERPL CALC-SCNC: 12 MMOL/L (ref 7–16)
APTT PPP: 28.1 SEC (ref 24.7–36)
AST SERPL-CCNC: 16 U/L (ref 12–45)
BARBITURATES UR QL SCN: NEGATIVE
BENZODIAZ UR QL SCN: NEGATIVE
BILIRUB SERPL-MCNC: 0.3 MG/DL (ref 0.1–1.5)
BUN SERPL-MCNC: 15 MG/DL (ref 8–22)
BZE UR QL SCN: NEGATIVE
CALCIUM ALBUM COR SERPL-MCNC: 9.7 MG/DL (ref 8.5–10.5)
CALCIUM SERPL-MCNC: 9.5 MG/DL (ref 8.5–10.5)
CANNABINOIDS UR QL SCN: POSITIVE
CHLORIDE SERPL-SCNC: 103 MMOL/L (ref 96–112)
CHOLEST SERPL-MCNC: 195 MG/DL (ref 100–199)
CO2 SERPL-SCNC: 21 MMOL/L (ref 20–33)
CREAT SERPL-MCNC: 0.94 MG/DL (ref 0.5–1.4)
EKG IMPRESSION: NORMAL
EKG IMPRESSION: NORMAL
EST. AVERAGE GLUCOSE BLD GHB EST-MCNC: 114 MG/DL
FENTANYL UR QL: NEGATIVE
FLUAV RNA SPEC QL NAA+PROBE: NEGATIVE
FLUBV RNA SPEC QL NAA+PROBE: NEGATIVE
GFR SERPLBLD CREATININE-BSD FMLA CKD-EPI: 88 ML/MIN/1.73 M 2
GLOBULIN SER CALC-MCNC: 3.3 G/DL (ref 1.9–3.5)
GLUCOSE SERPL-MCNC: 145 MG/DL (ref 65–99)
HBA1C MFR BLD: 5.6 % (ref 4–5.6)
HDLC SERPL-MCNC: 42 MG/DL
INR PPP: 0.95 (ref 0.87–1.13)
LDLC SERPL CALC-MCNC: 121 MG/DL
METHADONE UR QL SCN: NEGATIVE
OPIATES UR QL SCN: NEGATIVE
OXYCODONE UR QL SCN: NEGATIVE
PCP UR QL SCN: NEGATIVE
POTASSIUM SERPL-SCNC: 4 MMOL/L (ref 3.6–5.5)
PROPOXYPH UR QL SCN: NEGATIVE
PROT SERPL-MCNC: 7.1 G/DL (ref 6–8.2)
PROTHROMBIN TIME: 12.8 SEC (ref 12–14.6)
RSV RNA SPEC QL NAA+PROBE: NEGATIVE
SARS-COV-2 RNA RESP QL NAA+PROBE: NOTDETECTED
SODIUM SERPL-SCNC: 136 MMOL/L (ref 135–145)
SPECIMEN SOURCE: NORMAL
TRIGL SERPL-MCNC: 159 MG/DL (ref 0–149)
TROPONIN T SERPL-MCNC: 167 NG/L (ref 6–19)
TROPONIN T SERPL-MCNC: 179 NG/L (ref 6–19)
UFH PPP CHRO-ACNC: 0.19 IU/ML
UFH PPP CHRO-ACNC: 0.21 IU/ML
UFH PPP CHRO-ACNC: 0.23 IU/ML
UFH PPP CHRO-ACNC: <0.1 IU/ML

## 2023-12-06 PROCEDURE — 85520 HEPARIN ASSAY: CPT | Mod: 91

## 2023-12-06 PROCEDURE — 84484 ASSAY OF TROPONIN QUANT: CPT

## 2023-12-06 PROCEDURE — B2151ZZ FLUOROSCOPY OF LEFT HEART USING LOW OSMOLAR CONTRAST: ICD-10-PCS | Performed by: INTERNAL MEDICINE

## 2023-12-06 PROCEDURE — 99152 MOD SED SAME PHYS/QHP 5/>YRS: CPT

## 2023-12-06 PROCEDURE — 4A023N7 MEASUREMENT OF CARDIAC SAMPLING AND PRESSURE, LEFT HEART, PERCUTANEOUS APPROACH: ICD-10-PCS | Performed by: INTERNAL MEDICINE

## 2023-12-06 PROCEDURE — B2111ZZ FLUOROSCOPY OF MULTIPLE CORONARY ARTERIES USING LOW OSMOLAR CONTRAST: ICD-10-PCS | Performed by: INTERNAL MEDICINE

## 2023-12-06 PROCEDURE — 99152 MOD SED SAME PHYS/QHP 5/>YRS: CPT | Performed by: INTERNAL MEDICINE

## 2023-12-06 PROCEDURE — 700102 HCHG RX REV CODE 250 W/ 637 OVERRIDE(OP): Performed by: INTERNAL MEDICINE

## 2023-12-06 PROCEDURE — A9270 NON-COVERED ITEM OR SERVICE: HCPCS | Performed by: INTERNAL MEDICINE

## 2023-12-06 PROCEDURE — 700111 HCHG RX REV CODE 636 W/ 250 OVERRIDE (IP)

## 2023-12-06 PROCEDURE — 99223 1ST HOSP IP/OBS HIGH 75: CPT | Performed by: INTERNAL MEDICINE

## 2023-12-06 PROCEDURE — 80307 DRUG TEST PRSMV CHEM ANLYZR: CPT

## 2023-12-06 PROCEDURE — 700111 HCHG RX REV CODE 636 W/ 250 OVERRIDE (IP): Performed by: INTERNAL MEDICINE

## 2023-12-06 PROCEDURE — 770020 HCHG ROOM/CARE - TELE (206)

## 2023-12-06 PROCEDURE — 93010 ELECTROCARDIOGRAM REPORT: CPT | Performed by: INTERNAL MEDICINE

## 2023-12-06 PROCEDURE — 700117 HCHG RX CONTRAST REV CODE 255: Performed by: INTERNAL MEDICINE

## 2023-12-06 PROCEDURE — 93458 L HRT ARTERY/VENTRICLE ANGIO: CPT | Mod: 26 | Performed by: INTERNAL MEDICINE

## 2023-12-06 PROCEDURE — 83036 HEMOGLOBIN GLYCOSYLATED A1C: CPT

## 2023-12-06 PROCEDURE — 700101 HCHG RX REV CODE 250

## 2023-12-06 PROCEDURE — 85347 COAGULATION TIME ACTIVATED: CPT

## 2023-12-06 PROCEDURE — 36415 COLL VENOUS BLD VENIPUNCTURE: CPT

## 2023-12-06 PROCEDURE — 99233 SBSQ HOSP IP/OBS HIGH 50: CPT | Mod: GC | Performed by: INTERNAL MEDICINE

## 2023-12-06 PROCEDURE — 93005 ELECTROCARDIOGRAM TRACING: CPT | Performed by: INTERNAL MEDICINE

## 2023-12-06 RX ORDER — CITALOPRAM 30 MG/1
30 CAPSULE ORAL DAILY
COMMUNITY

## 2023-12-06 RX ORDER — HEPARIN SODIUM 200 [USP'U]/100ML
INJECTION, SOLUTION INTRAVENOUS
Status: COMPLETED
Start: 2023-12-06 | End: 2023-12-06

## 2023-12-06 RX ORDER — MIDAZOLAM HYDROCHLORIDE 1 MG/ML
INJECTION INTRAMUSCULAR; INTRAVENOUS
Status: COMPLETED
Start: 2023-12-06 | End: 2023-12-06

## 2023-12-06 RX ORDER — LOSARTAN POTASSIUM 50 MG/1
25 TABLET ORAL
Status: DISCONTINUED | OUTPATIENT
Start: 2023-12-06 | End: 2023-12-07

## 2023-12-06 RX ORDER — LIDOCAINE HYDROCHLORIDE 20 MG/ML
INJECTION, SOLUTION INFILTRATION; PERINEURAL
Status: COMPLETED
Start: 2023-12-06 | End: 2023-12-06

## 2023-12-06 RX ORDER — HEPARIN SODIUM 1000 [USP'U]/ML
INJECTION, SOLUTION INTRAVENOUS; SUBCUTANEOUS
Status: COMPLETED
Start: 2023-12-06 | End: 2023-12-06

## 2023-12-06 RX ORDER — CARVEDILOL 6.25 MG/1
6.25 TABLET ORAL 2 TIMES DAILY WITH MEALS
Status: COMPLETED | OUTPATIENT
Start: 2023-12-06 | End: 2023-12-07

## 2023-12-06 RX ORDER — VERAPAMIL HYDROCHLORIDE 2.5 MG/ML
INJECTION, SOLUTION INTRAVENOUS
Status: COMPLETED
Start: 2023-12-06 | End: 2023-12-06

## 2023-12-06 RX ADMIN — CITALOPRAM HYDROBROMIDE 30 MG: 20 TABLET ORAL at 05:48

## 2023-12-06 RX ADMIN — MIDAZOLAM HYDROCHLORIDE 2 MG: 1 INJECTION, SOLUTION INTRAMUSCULAR; INTRAVENOUS at 15:50

## 2023-12-06 RX ADMIN — HEPARIN SODIUM: 1000 INJECTION, SOLUTION INTRAVENOUS; SUBCUTANEOUS at 15:46

## 2023-12-06 RX ADMIN — HEPARIN SODIUM 18 UNITS/KG/HR: 5000 INJECTION, SOLUTION INTRAVENOUS at 19:17

## 2023-12-06 RX ADMIN — FLUTICASONE PROPIONATE 110 MCG: 110 AEROSOL, METERED RESPIRATORY (INHALATION) at 19:46

## 2023-12-06 RX ADMIN — METOPROLOL TARTRATE 25 MG: 25 TABLET, FILM COATED ORAL at 00:06

## 2023-12-06 RX ADMIN — FENTANYL CITRATE 100 MCG: 50 INJECTION, SOLUTION INTRAMUSCULAR; INTRAVENOUS at 15:51

## 2023-12-06 RX ADMIN — HEPARIN SODIUM 2000 UNITS: 200 INJECTION, SOLUTION INTRAVENOUS at 15:51

## 2023-12-06 RX ADMIN — ASPIRIN 81 MG: 81 TABLET, COATED ORAL at 05:48

## 2023-12-06 RX ADMIN — IOHEXOL 30 ML: 350 INJECTION, SOLUTION INTRAVENOUS at 16:04

## 2023-12-06 RX ADMIN — MIDAZOLAM HYDROCHLORIDE 2 MG: 1 INJECTION, SOLUTION INTRAMUSCULAR; INTRAVENOUS at 15:52

## 2023-12-06 RX ADMIN — LOSARTAN POTASSIUM 25 MG: 50 TABLET, FILM COATED ORAL at 12:43

## 2023-12-06 RX ADMIN — DOCUSATE SODIUM 50 MG AND SENNOSIDES 8.6 MG 2 TABLET: 8.6; 5 TABLET, FILM COATED ORAL at 05:47

## 2023-12-06 RX ADMIN — LIDOCAINE HYDROCHLORIDE: 20 INJECTION, SOLUTION INFILTRATION; PERINEURAL at 15:51

## 2023-12-06 RX ADMIN — NITROGLYCERIN 10 ML: 20 INJECTION INTRAVENOUS at 15:51

## 2023-12-06 RX ADMIN — HEPARIN SODIUM 16 UNITS/KG/HR: 5000 INJECTION, SOLUTION INTRAVENOUS at 01:09

## 2023-12-06 RX ADMIN — VERAPAMIL HYDROCHLORIDE 2.5 MG: 2.5 INJECTION, SOLUTION INTRAVENOUS at 15:51

## 2023-12-06 RX ADMIN — HEPARIN SODIUM 2000 UNITS: 1000 INJECTION, SOLUTION INTRAVENOUS; SUBCUTANEOUS at 01:21

## 2023-12-06 RX ADMIN — HEPARIN SODIUM 2000 UNITS: 1000 INJECTION, SOLUTION INTRAVENOUS; SUBCUTANEOUS at 09:40

## 2023-12-06 RX ADMIN — METOPROLOL TARTRATE 25 MG: 25 TABLET, FILM COATED ORAL at 05:48

## 2023-12-06 RX ADMIN — AMLODIPINE BESYLATE 5 MG: 5 TABLET ORAL at 05:48

## 2023-12-06 RX ADMIN — CARVEDILOL 6.25 MG: 6.25 TABLET, FILM COATED ORAL at 17:01

## 2023-12-06 RX ADMIN — ATORVASTATIN CALCIUM 80 MG: 80 TABLET, FILM COATED ORAL at 17:01

## 2023-12-06 RX ADMIN — HEPARIN SODIUM 2000 UNITS: 1000 INJECTION, SOLUTION INTRAVENOUS; SUBCUTANEOUS at 23:50

## 2023-12-06 ASSESSMENT — ENCOUNTER SYMPTOMS
COUGH: 1
SHORTNESS OF BREATH: 0
BLURRED VISION: 0
WEAKNESS: 0
FEVER: 0
LOSS OF CONSCIOUSNESS: 0
COUGH: 0
VOMITING: 0
BLOOD IN STOOL: 0
CHILLS: 0
SPUTUM PRODUCTION: 1
SEIZURES: 0
DIZZINESS: 0
DIAPHORESIS: 1
CONSTIPATION: 0
DOUBLE VISION: 0
ABDOMINAL PAIN: 0
ORTHOPNEA: 1
SORE THROAT: 0
WHEEZING: 0
MYALGIAS: 0
DIARRHEA: 0
NAUSEA: 0
PALPITATIONS: 0

## 2023-12-06 ASSESSMENT — LIFESTYLE VARIABLES
HAVE PEOPLE ANNOYED YOU BY CRITICIZING YOUR DRINKING: NO
CONSUMPTION TOTAL: NEGATIVE
TOTAL SCORE: 0
DOES PATIENT WANT TO STOP DRINKING: NO
ALCOHOL_USE: NO
AVERAGE NUMBER OF DAYS PER WEEK YOU HAVE A DRINK CONTAINING ALCOHOL: 0
TOTAL SCORE: 0
EVER FELT BAD OR GUILTY ABOUT YOUR DRINKING: NO
EVER HAD A DRINK FIRST THING IN THE MORNING TO STEADY YOUR NERVES TO GET RID OF A HANGOVER: NO
HOW MANY TIMES IN THE PAST YEAR HAVE YOU HAD 5 OR MORE DRINKS IN A DAY: 0
HAVE YOU EVER FELT YOU SHOULD CUT DOWN ON YOUR DRINKING: NO
TOTAL SCORE: 0
ON A TYPICAL DAY WHEN YOU DRINK ALCOHOL HOW MANY DRINKS DO YOU HAVE: 0

## 2023-12-06 ASSESSMENT — COGNITIVE AND FUNCTIONAL STATUS - GENERAL
SUGGESTED CMS G CODE MODIFIER DAILY ACTIVITY: CH
MOBILITY SCORE: 24
DAILY ACTIVITIY SCORE: 24
SUGGESTED CMS G CODE MODIFIER MOBILITY: CH

## 2023-12-06 ASSESSMENT — PAIN DESCRIPTION - PAIN TYPE: TYPE: ACUTE PAIN

## 2023-12-06 ASSESSMENT — FIBROSIS 4 INDEX: FIB4 SCORE: 0.85

## 2023-12-06 NOTE — CONSULTS
Reason for Consult:  Asked by Dr Roberta Smith M.D. to see this patient with nstemi    CC: chest pain    HPI:      67 year old man with PMH HTN, DEREK, asthma, seasonal allergies, obesity, works in sanitation laborious job presents with chest pressure progressive found nstemi. Patient describes typical angina. His blood pressure had been controlled so he attempted to hold antihypertensives. Former smoker over 40 pack years. Current marijuana use in moderation he says. No other toxic social habits.     Medications / Drug list prior to admission:  No current facility-administered medications on file prior to encounter.     Current Outpatient Medications on File Prior to Encounter   Medication Sig Dispense Refill    testosterone cypionate (DEPO-TESTOSTERONE) 200 MG/ML injection Inject 1.25 mg into the shoulder, thigh, or buttocks every 7 days. Indications: for low testosterone      citalopram (CELEXA) 20 MG Tab Take 20 mg by mouth every day.      AMLODIPINE BESYLATE PO Take  by mouth.      CYCLOBENZAPRINE HCL PO Take  by mouth. (Patient not taking: Reported on 10/18/2022)      MELOXICAM PO Take  by mouth. (Patient not taking: Reported on 10/20/2023)      Albuterol Sulfate 108 (90 BASE) MCG/ACT AEROSOL POWDER, BREATH ACTIVATED Inhale  by mouth.      aspirin 81 MG tablet Take 81 mg by mouth every day. (Patient not taking: Reported on 10/20/2023)      losartan (COZAAR) 50 MG Tab Take 100 mg by mouth every day. (Patient not taking: Reported on 10/20/2023)      losartan (COZAAR) 25 MG Tab Take 25 mg by mouth every day. (Patient not taking: Reported on 10/20/2023)      lovastatin (MEVACOR) 40 MG tablet Take 40 mg by mouth every evening.      mometasone (ASMANEX) 220 MCG/INH inhaler Inhale 2 Puffs by mouth every day.         Current list of administered Medications:    Current Facility-Administered Medications:     carvedilol (Coreg) tablet 6.25 mg, 6.25 mg, Oral, BID WITH MEALS, Terell Gray M.D.    losartan (Cozaar) tablet  25 mg, 25 mg, Oral, Q DAY, Terell Gray M.D.    senna-docusate (Pericolace Or Senokot S) 8.6-50 MG per tablet 2 Tablet, 2 Tablet, Oral, BID, 2 Tablet at 12/06/23 0547 **AND** polyethylene glycol/lytes (Miralax) Packet 1 Packet, 1 Packet, Oral, QDAY PRN **AND** magnesium hydroxide (Milk Of Magnesia) suspension 30 mL, 30 mL, Oral, QDAY PRN **AND** bisacodyl (Dulcolax) suppository 10 mg, 10 mg, Rectal, QDAY PRN, Jn Alonzo M.D.    acetaminophen (Tylenol) tablet 650 mg, 650 mg, Oral, Q6HRS PRN, Jn Alonzo M.D.    Notify provider if pain remains uncontrolled, , , CONTINUOUS **AND** Use the Numeric Rating Scale (NRS), Sánchez-Baker Faces (WBF), or FLACC on regular floors and Critical-Care Pain Observation Tool (CPOT) on ICUs/Trauma to assess pain, , , CONTINUOUS **AND** Pulse Ox, , , CONTINUOUS **AND** Pharmacy Consult Request ...Pain Management Review 1 Each, 1 Each, Other, PHARMACY TO DOSE **AND** If patient difficult to arouse and/or has respiratory depression (respiratory rate of 10 or less), stop any opiates that are currently infusing and call a Rapid Response., , , CONTINUOUS, Jn Alonzo M.D.    oxyCODONE immediate-release (Roxicodone) tablet 5 mg, 5 mg, Oral, Q3HRS PRN **OR** oxyCODONE immediate release (Roxicodone) tablet 10 mg, 10 mg, Oral, Q3HRS PRN **OR** morphine 4 MG/ML injection 4 mg, 4 mg, Intravenous, Q3HRS PRN, Jn Alonzo M.D.    labetalol (Normodyne/Trandate) injection 10 mg, 10 mg, Intravenous, Q4HRS PRN, Jn Alonzo M.D.    ondansetron (Zofran) syringe/vial injection 4 mg, 4 mg, Intravenous, Q4HRS PRN, Jn Alonzo M.D.    ondansetron (Zofran ODT) dispertab 4 mg, 4 mg, Oral, Q4HRS PRN, Jn Alonzo M.D.    aspirin EC tablet 81 mg, 81 mg, Oral, DAILY, Jn Alonzo M.D., 81 mg at 12/06/23 0548    atorvastatin (Lipitor) tablet 80 mg, 80 mg, Oral, Q EVENING, Jn Alonzo M.D.    nitroglycerin (Nitrostat) tablet 0.4 mg, 0.4 mg, Sublingual, Q5 MIN PRN,  Jn Alonzo M.D.    ipratropium-albuterol (DUONEB) nebulizer solution, 3 mL, Nebulization, Q4H PRN (RT), Jn Alonzo M.D.    morphine 4 MG/ML injection 2-4 mg, 2-4 mg, Intravenous, Q5 MIN PRN, Jn Alonzo M.D.    citalopram (CeleXA) tablet 30 mg, 30 mg, Oral, DAILY, Jn Alonzo M.D., 30 mg at 23 0548    fluticasone (Flovent HFA) 110 MCG/ACT inhaler 110 mcg, 1 Puff, Inhalation, BID (RT), Jn Alonzo M.D.    heparin infusion 25,000 units in 500 mL 0.45% NACL, 0-30 Units/kg/hr (Adjusted), Intravenous, Continuous, Jn Alonzo M.D., Last Rate: 31.2 mL/hr at 23 0939, 18 Units/kg/hr at 23 0939    heparin injection 2,000 Units, 2,000 Units, Intravenous, PRN, Jn Alonzo M.D., 2,000 Units at 23 0940    guaiFENesin dextromethorphan (Robitussin DM) 100-10 MG/5ML syrup 5 mL, 5 mL, Oral, Q6HRS PRN, Jn Alonzo M.D.    Past Medical History:   Diagnosis Date    Chickenpox     [place] measles     Influenza     Mumps     Sleep apnea        Past Surgical History:   Procedure Laterality Date    OTHER  [id number]    Mastoidectomy left ear       Family History   Problem Relation Age of Onset    Sleep Apnea Brother      Patient family history was personally reviewed, no pertinent family history to current presentation    Social History     Socioeconomic History    Marital status:      Spouse name: Not on file    Number of children: Not on file    Years of education: Not on file    Highest education level: Not on file   Occupational History    Not on file   Tobacco Use    Smoking status: Former     Current packs/day: 0.00     Average packs/day: 1 pack/day for 30.0 years (30.0 ttl pk-yrs)     Types: Cigarettes     Start date: 1971     Quit date: 2001     Years since quittin.9    Smokeless tobacco: Never   Vaping Use    Vaping Use: Never used   Substance and Sexual Activity    Alcohol use: No    Drug use: No    Sexual activity: Not on file   Other  "Topics Concern    Not on file   Social History Narrative    Not on file     Social Determinants of Health     Financial Resource Strain: Not on file   Food Insecurity: Not on file   Transportation Needs: Not on file   Physical Activity: Not on file   Stress: Not on file   Social Connections: Not on file   Intimate Partner Violence: Not on file   Housing Stability: Not on file       ALLERGIES:  No Known Allergies    Review of systems:  A complete review of symptoms was reviewed with patient. This is reviewed in H&P and PMH. ALL OTHERS reviewed and negative    Physical exam:  Patient Vitals for the past 24 hrs:   BP Temp Temp src Pulse Resp SpO2 Height Weight   12/06/23 0756 131/76 36.8 °C (98.3 °F) Temporal 91 20 95 % -- --   12/06/23 0541 (!) 140/77 -- -- 64 18 -- -- --   12/06/23 0400 -- -- -- -- -- -- -- 112 kg (247 lb 12.8 oz)   12/06/23 0353 128/70 36.3 °C (97.4 °F) Temporal 62 18 96 % -- --   12/06/23 0002 138/75 37 °C (98.6 °F) Temporal 64 18 91 % 1.727 m (5' 7.99\") 114 kg (250 lb 14.1 oz)   12/05/23 2241 (!) 164/93 37 °C (98.6 °F) Temporal 73 18 91 % 1.727 m (5' 8\") 114 kg (250 lb 14.1 oz)     General: No acute distress.   EYES: no jaundice  HEENT: OP clear   Neck: No bruits No JVD.   CVS: RRR. S1 + S2. No M/R/G. No edema.  Resp: CTAB. No wheezing or crackles/rhonchi.  Abdomen: Soft, NT, ND,  Skin: Grossly nothing acute no obvious rashes  Neurological: Alert, Moves all extremities, no cranial nerve defects on limited exam  Extremities: Pulse 2+ in b/l LE. No cyanosis.     Data:  Laboratory studies personally reviewed by me:  Recent Results (from the past 24 hour(s))   aPTT    Collection Time: 12/05/23 11:33 PM   Result Value Ref Range    APTT 28.1 24.7 - 36.0 sec   Prothrombin Time    Collection Time: 12/05/23 11:33 PM   Result Value Ref Range    PT 12.8 12.0 - 14.6 sec    INR 0.95 0.87 - 1.13   Heparin Xa (Unfractionated)    Collection Time: 12/05/23 11:33 PM   Result Value Ref Range    Heparin Xa (UFH) " <0.10 IU/mL   CBC with Differential    Collection Time: 12/05/23 11:33 PM   Result Value Ref Range    WBC 8.5 4.8 - 10.8 K/uL    RBC 5.63 4.70 - 6.10 M/uL    Hemoglobin 17.2 14.0 - 18.0 g/dL    Hematocrit 51.7 42.0 - 52.0 %    MCV 91.8 81.4 - 97.8 fL    MCH 30.6 27.0 - 33.0 pg    MCHC 33.3 32.3 - 36.5 g/dL    RDW 44.9 35.9 - 50.0 fL    Platelet Count 289 164 - 446 K/uL    MPV 9.0 9.0 - 12.9 fL    Neutrophils-Polys 56.50 44.00 - 72.00 %    Lymphocytes 31.80 22.00 - 41.00 %    Monocytes 9.00 0.00 - 13.40 %    Eosinophils 1.80 0.00 - 6.90 %    Basophils 0.70 0.00 - 1.80 %    Immature Granulocytes 0.20 0.00 - 0.90 %    Nucleated RBC 0.00 0.00 - 0.20 /100 WBC    Neutrophils (Absolute) 4.79 1.82 - 7.42 K/uL    Lymphs (Absolute) 2.70 1.00 - 4.80 K/uL    Monos (Absolute) 0.76 0.00 - 0.85 K/uL    Eos (Absolute) 0.15 0.00 - 0.51 K/uL    Baso (Absolute) 0.06 0.00 - 0.12 K/uL    Immature Granulocytes (abs) 0.02 0.00 - 0.11 K/uL    NRBC (Absolute) 0.00 K/uL   Comp Metabolic Panel (CMP)    Collection Time: 12/05/23 11:33 PM   Result Value Ref Range    Sodium 136 135 - 145 mmol/L    Potassium 4.0 3.6 - 5.5 mmol/L    Chloride 103 96 - 112 mmol/L    Co2 21 20 - 33 mmol/L    Anion Gap 12.0 7.0 - 16.0    Glucose 145 (H) 65 - 99 mg/dL    Bun 15 8 - 22 mg/dL    Creatinine 0.94 0.50 - 1.40 mg/dL    Calcium 9.5 8.5 - 10.5 mg/dL    Correct Calcium 9.7 8.5 - 10.5 mg/dL    AST(SGOT) 16 12 - 45 U/L    ALT(SGPT) 19 2 - 50 U/L    Alkaline Phosphatase 63 30 - 99 U/L    Total Bilirubin 0.3 0.1 - 1.5 mg/dL    Albumin 3.8 3.2 - 4.9 g/dL    Total Protein 7.1 6.0 - 8.2 g/dL    Globulin 3.3 1.9 - 3.5 g/dL    A-G Ratio 1.2 g/dL   Lipid Profile (Tomorrow AM)    Collection Time: 12/05/23 11:33 PM   Result Value Ref Range    Cholesterol,Tot 195 100 - 199 mg/dL    Triglycerides 159 (H) 0 - 149 mg/dL    HDL 42 >=40 mg/dL     (H) <100 mg/dL   TROPONIN    Collection Time: 12/05/23 11:33 PM   Result Value Ref Range    Troponin T 179 (H) 6 - 19 ng/L    ESTIMATED GFR    Collection Time: 23 11:33 PM   Result Value Ref Range    GFR (CKD-EPI) 88 >60 mL/min/1.73 m 2   EKG - STAT Once    Collection Time: 23 11:44 PM   Result Value Ref Range    Report       [facility]    Test Date:  2023  Pt Name:    AMBER GARNER              Department: [facility]  MRN:        [id number]                      Room:  Gender:     Male                         Technician: FUNMILAYO  :        1955                   Requested By:BRAYAN BROWN  Order #:    [id number]                    Reading MD:    Measurements  Intervals                                Axis  Rate:       70                           P:          -47  DC:         167                          QRS:        14  QRSD:       89                           T:          22  QT:         385  QTc:        416    Interpretive Statements  Sinus or ectopic atrial rhythm  No previous ECG available for comparison     CoV-2, Flu A/B, And RSV by PCR ([employer])    Collection Time: 23 11:50 PM    Specimen: Nasopharyngeal; Respirate   Result Value Ref Range    Influenza virus A RNA Negative Negative    Influenza virus B, PCR Negative Negative    RSV, PCR Negative Negative    SARS-CoV-2 by PCR NotDetected     SARS-CoV-2 Source NP Swab    URINE DRUG SCREEN    Collection Time: 23  4:44 AM   Result Value Ref Range    Amphetamines Urine Negative Negative    Barbiturates Negative Negative    Benzodiazepines Negative Negative    Cocaine Metabolite Negative Negative    Fentanyl, Urine Negative Negative    Methadone Negative Negative    Opiates Negative Negative    Oxycodone Negative Negative    Phencyclidine -Pcp Negative Negative    Propoxyphene Negative Negative    Cannabinoid Metab Positive (A) Negative   Heparin Anti-Xa    Collection Time: 23  5:34 AM   Result Value Ref Range    Heparin Xa (UFH) 0.19 IU/mL   HEMOGLOBIN A1C    Collection Time: 23  5:34 AM   Result Value Ref Range    Glycohemoglobin 5.6 4.0 -  5.6 %    Est Avg Glucose 114 mg/dL   TROPONIN    Collection Time: 23  6:38 AM   Result Value Ref Range    Troponin T 167 (H) 6 - 19 ng/L   Heparin Xa (Unfractionated)    Collection Time: 23  6:38 AM   Result Value Ref Range    Heparin Xa (UFH) 0.21 IU/mL   EKG    Collection Time: 23  8:59 AM   Result Value Ref Range    Report       [facility]    Test Date:  2023  Pt Name:    AMBER GARNER              Department: [facility]  MRN:        [id number]                      Room:       [facility]  Gender:     Male                         Technician: JULIENNE  :        1955                   Requested By:COTY PIERSON  Order #:    [id number]                    Reading MD:    Measurements  Intervals                                Axis  Rate:       64                           P:          -39  UT:         173                          QRS:        25  QRSD:       79                           T:          20  QT:         399  QTc:        412    Interpretive Statements  Sinus rhythm  Minimal ST elevation, anterior leads  Compared to ECG 2023 23:44:29  ST (T wave) deviation now present  Ectopic atrial rhythm no longer present         EKG 23 interpreted by me sinus, J point nonspecific elevation    All pertinent features of laboratory and imaging reviewed including primary images where applicable      Principal Problem:    NSTEMI (non-ST elevated myocardial infarction) (HCC) (POA: Yes)  Active Problems:    Mild intermittent asthma without complication (POA: Yes)    Dyslipidemia (POA: Yes)    Essential hypertension (POA: Yes)    DEREK (POA: Yes)    BMI 38 (POA: Yes)    Cough (POA: Unknown)  Resolved Problems:    * No resolved hospital problems. 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     -aspirin and heparin gtt  -high intensity statin  -carvedilol and  losartan. BP goal <130/80  -TTE  -[facility]  -check a1c    I personally discussed his case with Dr Contreras     It is my pleasure to participate in the care of Mr. Hammond.  Please do not hesitate to contact me with questions or concerns.    Terell Gray MD  Cardiologist [facility]     A total of 65 minutes of time was spent on day of encounter reviewing medical record, performing history and examination, counseling, ordering medication/test/consults and documentation.

## 2023-12-06 NOTE — ASSESSMENT & PLAN NOTE
- 67-year-old male with morbid obesity, uncontrolled hypertension, presented with typical chest pain, elevated troponin without ST/T changes on EKG  - Seen by Cardiology, 12/6/23 cardiac cath performed demonstrating severe multivessel epicardial CAD     - Cardiology consulted, appreciate recommendations  -Cardiac surgery on board.  Patient to have CABG on 12/8  - Resuming Heparin drip post-procedure  - Aspirin  - Therapeutic heparin drip  - Echo  - Started on carvedilol and losartan

## 2023-12-06 NOTE — CARE PLAN
The patient is Stable - Low risk of patient condition declining or worsening    Shift Goals  Clinical Goals: Heparin drip, NPO @ [facility] for coronary angiography vs stress test in the morning, cardiology consult in am  Patient Goals: sleep  Family Goals: not present    Progress made toward(s) clinical / shift goals:    Problem: Knowledge Deficit - Standard  Goal: Patient and family/care givers will demonstrate understanding of plan of care, disease process/condition, diagnostic tests and medications  Outcome: Progressing  Note:  Pt educated on plan of care, disease process, medications, expected outcomes and goals.          Problem: Optimal Care for the Acute Coronary Syndrome Patient  Goal: Optimal Care for the Acute Coronary Syndrome Patient  Outcome: Progressing  Note: Pt educated on the disease process of NSTEMI, cardiac monitoring per order, troponins q6H until stable ans EKG on admission.  Goal: Potential Interventions for the Acute Coronary Syndrome Patient  Outcome: Progressing  Note: Pt educated on plan of care, NPO since midnight for coronary angiography versus stress test per provider's plan. Cardiology consult this am.     Problem: Respiratory  Goal: Patient will achieve/maintain optimum respiratory ventilation and gas exchange  Outcome: Progressing  Note: Pt has no s/sx of respiratory distress on room air.     Problem: Fall Risk  Goal: Patient will remain free from falls  Outcome: Progressing  Note: Pt educated on fall precautions, bed locked and in lowest position. Pt instructed to use call light if help is needed. Call light and personal belongings within reach. Hourly rounding in place.       Patient is not progressing towards the following goals:

## 2023-12-06 NOTE — PROGRESS NOTES
Med rec complete per Pt.       Allergies reviewed.    Has patient had any outside antibiotics in the last 30 days? N    Any Anticoagulants (rivaroxaban, apixaban, edoxaban, dabigatran, warfarin, enoxaparin) taken in the last 14 days? N           Pharmacy/Pharmacies Pt utilizes : [facility] [phone]

## 2023-12-06 NOTE — DISCHARGE PLANNING
Care Transition Team Assessment    Information Source  Orientation Level: Oriented X4  Information Given By: Patient  Informant's Name:  (Edwin Hammond)  Who is responsible for making decisions for patient? : Patient    Readmission Evaluation  Is this a readmission?: No         Interdisciplinary Discharge Planning  Lives with - Patient's Self Care Capacity: Adult Children  Patient or legal guardian wants to designate a caregiver: No  Support Systems: Family Member(s)  Housing / Facility: [address]  Name of Care Facility: n/a  Durable Medical Equipment: Not Applicable    Discharge Preparedness  What is your plan after discharge?: Home with help  What are your discharge supports?: Child  Prior Functional Level: Ambulatory, Independent with Activities of Daily Living, Drives Self, Independent with Medication Management  Difficulity with ADLs: None  Difficulity with IADLs: None    Functional Assesment  Prior Functional Level: Ambulatory, Independent with Activities of Daily Living, Drives Self, Independent with Medication Management    Finances  Financial Barriers to Discharge: No  Prescription Coverage: Yes    Vision / Hearing Impairment  Vision Impairment : Yes  Right Eye Vision: Wears Glasses  Left Eye Vision: Wears Glasses  Hearing Impairment : No    Patient is a 67-year-old male admitted for NSTEMI. Patient is alert and oriented x4. Please see pt's H&P for prior medical history. Patient reports, patient lives in a one-story home with two steps to enter the home with; pt's daughter, Rama Hammond (p) [phone] is emergency contact. Patient reports, pt is employed full-time and does not require the assistance of DME. Patient reports, no MPOA. Patient reports, PCP as Dr. Juice Castro. Patient provided this writer consent to complete assessment with pt's friend at bedside, Celso. Patient reports no history of SNF/IPR nor HHC. Patient denies current feelings and thoughts of hopelessness. Patient reports, pt's  PCP has prescribed Celexa for pt. Patient reports, pt drives and has means to transportation. Patient reports, once medically stable for discharge; pt's daughter to provide transport to pt's home. Patient confirms medical coverage as: Medicare and Bday. Patient's preferred pharmacy is [facility]. Patient denies alcohol, and tobacco use. Patient reports use of marijuana gummies; pt declines substance use resources.  SW discussed discharge planning and goals of care. Patient reported no additional need for social work support at this time. SW to remain available for discharge planning needs.     Advance Directive  Advance Directive?: None  Advance Directive offered?: AD Booklet refused    Domestic Abuse  Have you ever been the victim of abuse or violence?: No  Physical Abuse or Sexual Abuse: No  Verbal Abuse or Emotional Abuse: No  Possible Abuse/Neglect Reported to:: Not Applicable    Psychological Assessment  History of Substance Abuse: None (Pt denies)  History of Psychiatric Problems: No (Pt denies)  Non-compliant with Treatment: No (Pt denies)  Newly Diagnosed Illness: No    Discharge Risks or Barriers  Discharge risks or barriers?: No  Patient risk factors:  (None)    Anticipated Discharge Information  Discharge Disposition: Discharged to home/self care (01)  Discharge Address:  ([address] [place] [address])  Discharge Contact Phone Number:  (Rama MoranHammond p) [phone]; pt's daughter)

## 2023-12-06 NOTE — CONSULTS
"  This note is for learning purposes only and not for billing. Note written by Janine Valle, Medical Student    Cardiology Consult Note:     Note Author: Janine Valle, Student  Date: 12/6/2023    Date of Admission: 12/5/2023  Attending: Dr. Gray  Medical Student: Janine Valle, MS4    Reason for interval visit  (Principal Problem)   NSTEMI (non-ST elevated myocardial infarction) (HCC)    Chief Complaint:   Edwin Hammond is a 67 y.o. male with a PMH of DEREK, HTN, former smoker with 40-pack year history, and asthma who was transferred from [place] on 12/5/2023 with progressive chest pain, found NSTEMI.    Subjective:   Patient has been experiencing episodes of typical anginal chest pain that worsened with exertion like walking and improved at rest. Patient works in sanitation and noticed symptoms at work. Symptoms increased in frequent over the past week and chest pain began to persist at rest. On exertion patient rated the pain as 8/10 and at rest a 2/10. Pain is substernal, described as \"pressure,\" and non-radiating. Concurrent symptoms of productive, worsening cough. Endorses symptoms of orthopnea. Patient's blood pressure was controlled and he attempted to hold his antihypertensives over the past 1-2 weeks.     In ED patient was given nitro/asa which improved the pain. Troponin elevated 135 and transferred to [facility]. ECG showed no acute ST changes. However, repeat troponin 179.     Patient does not report any current chest pain. Continued symptoms of cough with orthopnea.     Review of Systems:   Review of Systems   Constitutional:  Positive for diaphoresis.   Respiratory:  Positive for cough and sputum production. Negative for shortness of breath.    Cardiovascular:  Positive for orthopnea and leg swelling. Negative for chest pain and palpitations.   Gastrointestinal:  Negative for nausea.   Neurological:  Negative for dizziness.       Objective Data:   Physical Exam:   Vitals:   Temp:  [36.3 °C (97.4 " °F)-37 °C (98.6 °F)] 36.9 °C (98.4 °F)  Pulse:  [61-91] 61  Resp:  [18-20] 18  BP: (128-164)/(70-93) 143/80  SpO2:  [91 %-96 %] 95 %    Intake/Output Summary (Last 24 hours) at 2023 1217  Last data filed at 2023 1000  Gross per 24 hour   Intake 400 ml   Output 250 ml   Net 150 ml       Physical Exam  Constitutional:       General: He is not in acute distress.     Appearance: Normal appearance.   HENT:      Mouth/Throat:      Mouth: Mucous membranes are dry.   Cardiovascular:      Pulses: Normal pulses.           Radial pulses are 2+ on the right side and 2+ on the left side.      Heart sounds: Normal heart sounds. No murmur heard.  Pulmonary:      Effort: Pulmonary effort is normal.      Breath sounds: Wheezing present. No rales.   Abdominal:      General: There is no distension.      Palpations: Abdomen is soft.      Tenderness: There is no abdominal tenderness.   Musculoskeletal:      Right lower le+ Edema present.      Left lower le+ Edema present.       Lab Results:  Recent Labs     23   WBC 8.5   RBC 5.63   HEMOGLOBIN 17.2   HEMATOCRIT 51.7   MCV 91.8   MCH 30.6   RDW 44.9   PLATELETCT 289   MPV 9.0   NEUTSPOLYS 56.50   LYMPHOCYTES 31.80   MONOCYTES 9.00   EOSINOPHILS 1.80   BASOPHILS 0.70     Recent Labs     233   SODIUM 136   POTASSIUM 4.0   CHLORIDE 103   CO2 21   BUN 15   CREATININE 0.94   CALCIUM 9.5   ALBUMIN 3.8     Estimated GFR/CRCL = Estimated Creatinine Clearance: 92.5 mL/min (by C-G formula based on SCr of 0.94 mg/dL).  Recent Labs     23   GLUCOSE 145*     Recent Labs     23   ASTSGOT 16   ALTSGPT 19   TBILIRUBIN 0.3   ALKPHOSPHAT 63   GLOBULIN 3.3   INR 0.95             Recent Labs     23   INR 0.95   APTT 28.1       Microbiology Results:  Results       Procedure Component Value Units Date/Time    CoV-2, Flu A/B, And RSV by PCR ([employer]) [[id number]] Collected: 23 235    Order Status: Completed Specimen: Respirate  from Nasopharyngeal Updated: 23 0130     Influenza virus A RNA Negative     Influenza virus B, PCR Negative     RSV, PCR Negative     SARS-CoV-2 by PCR NotDetected     Comment: [facility] providers: PLEASE REFER TO DE-ESCALATION AND RETESTING PROTOCOL  on [email or web address]    **The [employer] GeneXpert Xpress SARS-CoV-2 RT-PCR Test has been made  available for use under the Emergency Use Authorization (EUA) only.          SARS-CoV-2 Source NP Swab    Narrative:      Collected By: [id number] TISH DURAN            Imaging Results:  OUTSIDE IMAGES-DX CHEST   Final Result      EC-ECHOCARDIOGRAM COMPLETE W/O CONT    (Results Pending)   CL-LEFT HEART CATHETERIZATION WITH POSSIBLE INTERVENTION    (Results Pending)       EKG  Results for orders placed or performed during the hospital encounter of 23   EKG - STAT Once   Result Value Ref Range    Report       [facility]    Test Date:  2023  Pt Name:    AMBER GARNER              Department: [facility]  MRN:        [id number]                      Room:  Gender:     Male                         Technician: FUNMILAYO  :        1955                   Requested By:BRAYAN BROWN  Order #:    [id number]                    Reading MD: Alisa Fernandez MD    Measurements  Intervals                                Axis  Rate:       70                           P:          -47  CT:         167                          QRS:        14  QRSD:       89                           T:          22  QT:         385  QTc:        416    Interpretive Statements  Sinus or ectopic atrial rhythm  No previous ECG available for comparison  Electronically Signed On 2023 11:08:27 PST by Alisa Fernandez MD     EKG   Result Value Ref Range    Report       [facility]    Test Date:  2023  Pt Name:    AMBER GARNER              Department: [facility]  MRN:        [id number]                      Room:       [facility]  Gender:     Male                         Technician: YURY  :         1955                   Requested By:TERELL GRAY  Order #:    [id number]                    Reading MD: Alisa Fernandez MD    Measurements  Intervals                                Axis  Rate:       64                           P:          -39  CA:         173                          QRS:        25  QRSD:       79                           T:          20  QT:         399  QTc:        412    Interpretive Statements  Sinus rhythm  Minimal ST elevation, anterior leads  Compared to ECG 12/05/2023 23:44:29  ST (T wave) deviation now present  Ectopic atrial rhythm no longer present  Electronically Signed On 12- 11:11:05 PST by Alisa Fernandez MD         Current Medications    Current Facility-Administered Medications:     carvedilol (Coreg) tablet 6.25 mg, 6.25 mg, Oral, BID WITH MEALS, Terell Gray M.D.    losartan (Cozaar) tablet 25 mg, 25 mg, Oral, Q DAY, Terell Gray M.D.    senna-docusate (Pericolace Or Senokot S) 8.6-50 MG per tablet 2 Tablet, 2 Tablet, Oral, BID, 2 Tablet at 12/06/23 0547 **AND** polyethylene glycol/lytes (Miralax) Packet 1 Packet, 1 Packet, Oral, QDAY PRN **AND** magnesium hydroxide (Milk Of Magnesia) suspension 30 mL, 30 mL, Oral, QDAY PRN **AND** bisacodyl (Dulcolax) suppository 10 mg, 10 mg, Rectal, QDAY PRN, Jn Alonzo M.D.    acetaminophen (Tylenol) tablet 650 mg, 650 mg, Oral, Q6HRS PRN, Jn Alonzo M.D.    Notify provider if pain remains uncontrolled, , , CONTINUOUS **AND** Use the Numeric Rating Scale (NRS), Sánchez-Baker Faces (WBF), or FLACC on regular floors and Critical-Care Pain Observation Tool (CPOT) on ICUs/Trauma to assess pain, , , CONTINUOUS **AND** Pulse Ox, , , CONTINUOUS **AND** Pharmacy Consult Request ...Pain Management Review 1 Each, 1 Each, Other, PHARMACY TO DOSE **AND** If patient difficult to arouse and/or has respiratory depression (respiratory rate of 10 or less), stop any opiates that are currently infusing and call a Rapid  Response., , , CONTINUOUS, Jn Alonzo M.D.    oxyCODONE immediate-release (Roxicodone) tablet 5 mg, 5 mg, Oral, Q3HRS PRN **OR** oxyCODONE immediate release (Roxicodone) tablet 10 mg, 10 mg, Oral, Q3HRS PRN **OR** morphine 4 MG/ML injection 4 mg, 4 mg, Intravenous, Q3HRS PRN, Jn Alonzo M.D.    labetalol (Normodyne/Trandate) injection 10 mg, 10 mg, Intravenous, Q4HRS PRN, Jn Alonzo M.D.    ondansetron (Zofran) syringe/vial injection 4 mg, 4 mg, Intravenous, Q4HRS PRN, Jn Alonzo M.D.    ondansetron (Zofran ODT) dispertab 4 mg, 4 mg, Oral, Q4HRS PRN, Jn Alonzo M.D.    aspirin EC tablet 81 mg, 81 mg, Oral, DAILY, Jn Alonzo M.D., 81 mg at 12/06/23 0548    atorvastatin (Lipitor) tablet 80 mg, 80 mg, Oral, Q EVENING, Jn Alonzo M.D.    nitroglycerin (Nitrostat) tablet 0.4 mg, 0.4 mg, Sublingual, Q5 MIN PRN, Jn Alonzo M.D.    ipratropium-albuterol (DUONEB) nebulizer solution, 3 mL, Nebulization, Q4H PRN (RT), Jn Alonzo M.D.    morphine 4 MG/ML injection 2-4 mg, 2-4 mg, Intravenous, Q5 MIN PRN, Jn lAonzo M.D.    citalopram (CeleXA) tablet 30 mg, 30 mg, Oral, DAILY, Jn Alonzo M.D., 30 mg at 12/06/23 0548    fluticasone (Flovent HFA) 110 MCG/ACT inhaler 110 mcg, 1 Puff, Inhalation, BID (RT), Jn Alonzo M.D.    heparin infusion 25,000 units in 500 mL 0.45% NACL, 0-30 Units/kg/hr (Adjusted), Intravenous, Continuous, Jn Alonzo M.D., Last Rate: 31.2 mL/hr at 12/06/23 0939, 18 Units/kg/hr at 12/06/23 0939    heparin injection 2,000 Units, 2,000 Units, Intravenous, PRN, Jn Alonzo M.D., 2,000 Units at 12/06/23 0940    guaiFENesin dextromethorphan (Robitussin DM) 100-10 MG/5ML syrup 5 mL, 5 mL, Oral, Q6HRS PRN, Jn Alonzo M.D.    A/P:   Edwin Hammond is a 67 y.o. male with a PMH of DEREK, HTN, former smoker with 40-pack year history, and asthma who was transferred from [place] on 12/5/2023 with progressive chest pain,  found elevated troponin with negative ECG changes and chest pain at rest - NSTEMI. Patient has multiple cardiac risk factors.     Plan:   - Heparin gtt and aspirin   - High intensity statin (Atrovastatin)   - Pending TTE  - Check Ha1c for elevated glucose on CMP.   - Schedule [facility]      Note written by: Janine Valle MS4  Supervising Attending: Dr. Gray - Cardio  [employer] [facility] [employer]     No new Assessment & Plan notes have been filed under this hospital service since the last note was generated.  Service: Cardiology

## 2023-12-06 NOTE — ASSESSMENT & PLAN NOTE
- Uncontrolled and reported readings around 180/100 prior to admission, noncompliant with amlodipine    - Started on Carvedilol and Losartan for cardioprotection benefit

## 2023-12-06 NOTE — PROGRESS NOTES
[facility] DIRECT ADMISSION REPORT  Transferring facility: [facility]  Transferring physician: Bisi Cordoba    Chief complaint: Chest pain  Pertinent history & patient course: 67-year-old male with past medical history of sleep apnea, hypertension, asthma, presented for evaluation with recurrent chest pain in the last 3-week.  He found to have elevated troponin 135, , blood pressure 144/86, heart rate 70.  EKG showed ST depression in inferior leads.  Chest x-ray unremarkable.  Chest pain resolved after nitroglycerin and aspirin.  Patient is being started on IV heparin.  Requested transfer for cardiology consult.  Pertinent imaging & lab results:   Consultants called prior to transfer and pertinent input from consultants:   Code Status: full per transferring provider, I personally verified with the transferring provider patient's code status and the transferring provider has confirmed this with the patient.  Reason for Transfer: nstemi  Further work up or recommendations requested prior to transfer:     Patient accepted for transfer: Yes  Accepting [facility] Facility: [facility] - Nursing to notify the Triage Coordinator in the RTOC via Voalte or Phone ext. [phone] when patient arrives to the unit. The Triage Coordinator will assign the admitting provider.    Consultants to be called upon arrival: card  Admission status: Inpatient.   Floor requested: tele  If ICU transfer, name of intensivist case discussed with and pertinent input from critical care: n/a    The admitting provider is the point of contact for questions or concerns regarding patient's care.

## 2023-12-06 NOTE — PROGRESS NOTES
4 Eyes Skin Assessment Completed by WESTON Hess and WESTON Newman.    Head WDL  Ears WDL  Nose WDL  Mouth WDL  Neck WDL  Breast/Chest WDL  Shoulder Blades WDL  Spine WDL  (R) Arm/Elbow/Hand WDL  (L) Arm/Elbow/Hand WDL  Abdomen WDL  Groin WDL  Scrotum/Coccyx/Buttocks WDL  (R) Leg WDL  (L) Leg WDL  (R) Heel/Foot/Toe WDL  (L) Heel/Foot/Toe WDL          Devices In Places Tele Box and Blood Pressure Cuff      Interventions In Place Waffle Overlay    Possible Skin Injury No    Pictures Uploaded Into Epic N/A  Wound Consult Placed N/A  RN Wound Prevention Protocol Ordered No

## 2023-12-06 NOTE — PROGRESS NOTES
@2240: Pt arrived from [place] with Heparin drip running at 10 mL/hr per Minna, RN report.  @0044: Pt's Anti Xa came back < 0.10. Received a call from Pharmacist Fidel JUAN, that Heparin drip can continue to 16 units/kg/hr and bolus per Heparin protocol.

## 2023-12-06 NOTE — H&P
Hospital Medicine History & Physical Note    Date of Service  12/5/2023    Primary Care Physician  Juice Castro M.D.      Code Status  Full Code    Chief Complaint  Pain and shortness of breath    History of Presenting Illness  Edwin Hammond is a 67 y.o. male with past medical history of obesity, sleep apnea, hypertension, asthma who presented as a direct admit from [facility] with concern for chest pain on exertion, pressure-like, in the middle of the chest, alleviated by rest, lasting 2 to 3 minutes and elevated blood pressure up to 180/100 in the last 4 to 5 days.  He also reports some congestion, cough without sputum last week, chills.  He reported being off his amlodipine for the last 2 weeks   He went to check his blood pressure yesterday at the [facility] clinic and it  was elevated.  He was then brought to outside facility ER by ambulance.  He was given nitroglycerin and aspirin.  In the ER he denied any symptoms.  Chest x-ray: No evidence of active cardiopulmonary disease.  Cardiac silhouette is within normal limits  CBC: WBC 10.2, hemoglobin 17.7, platelets 304.  CMP: Blood sugar 114, creatinine BUN normal, sodium potassium chloride normal.  LFTs and bilirubin are not elevated.  Bicarb 20, anion gap 11.  Troponin T high-sensitivity 135, NT proBNP 253.  Vitals 144/86, heart rate 70, on room air at 97%.  EKG sinus rhythm, heart rate 72, QTc 392, no T/ST changes, horizontal position.  He was given IV heparin bolus and transferred here  At this time he denies any chest pain or shortness of breath.      I discussed the plan of care with patient and bedside RN, transferring physician .    Review of Systems  Review of Systems   Constitutional:  Negative for chills, fever and weight loss.   HENT:  Positive for congestion. Negative for ear pain, hearing loss and tinnitus.    Eyes:  Negative for blurred vision, double vision and photophobia.   Respiratory:  Positive for cough and shortness of  breath. Negative for hemoptysis and sputum production.    Cardiovascular:  Positive for chest pain. Negative for palpitations, orthopnea and leg swelling.   Gastrointestinal:  Negative for heartburn, nausea and vomiting.   Genitourinary:  Negative for dysuria, flank pain, frequency and hematuria.   Musculoskeletal:  Negative for back pain and neck pain.   Skin:  Negative for itching and rash.   Neurological:  Negative for tremors, speech change, focal weakness and headaches.   Endo/Heme/Allergies:  Negative for environmental allergies and polydipsia. Does not bruise/bleed easily.   Psychiatric/Behavioral:  Negative for hallucinations and substance abuse. The patient is not nervous/anxious.        Past Medical History   has a past medical history of Chickenpox, Georgian measles, Influenza, Mumps, and Sleep apnea.    Surgical History   has a past surgical history that includes other ([id number]).     Family History  family history includes Sleep Apnea in his brother.   Family history reviewed with patient. There is no family history that is pertinent to the chief complaint.     Social History   reports that he quit smoking about 22 years ago. His smoking use included cigarettes. He started smoking about 52 years ago. He has a 30.0 pack-year smoking history. He has never used smokeless tobacco. He reports that he does not drink alcohol and does not use drugs.    Allergies  No Known Allergies    Medications  Cannot display prior to admission medications because the patient has not been admitted in this contact.       Physical Exam  Temp:  [37 °C (98.6 °F)] 37 °C (98.6 °F)  Pulse:  [73] 73  Resp:  [18] 18  BP: (164)/(93) 164/93  SpO2:  [91 %] 91 %  Blood Pressure : (!) 164/93 (rn notified)   Temperature: 37 °C (98.6 °F)   Pulse: 73   Respiration: 18   Pulse Oximetry: 91 %       Physical Exam  Vitals and nursing note reviewed.   Constitutional:       General: He is not in acute distress.     Appearance: Normal appearance.  "  HENT:      Head: Normocephalic and atraumatic.      Nose: Nose normal.      Mouth/Throat:      Mouth: Mucous membranes are moist.   Eyes:      Extraocular Movements: Extraocular movements intact.      Pupils: Pupils are equal, round, and reactive to light.   Cardiovascular:      Rate and Rhythm: Normal rate and regular rhythm.   Pulmonary:      Effort: Pulmonary effort is normal.      Breath sounds: Normal breath sounds.   Abdominal:      General: Abdomen is flat. There is no distension.      Tenderness: There is no abdominal tenderness.   Musculoskeletal:         General: No swelling or deformity. Normal range of motion.      Cervical back: Normal range of motion and neck supple.   Skin:     General: Skin is warm and dry.   Neurological:      General: No focal deficit present.      Mental Status: He is alert and oriented to person, place, and time.   Psychiatric:         Mood and Affect: Mood normal.         Behavior: Behavior normal.         Laboratory:          No results for input(s): \"ALTSGPT\", \"ASTSGOT\", \"ALKPHOSPHAT\", \"TBILIRUBIN\", \"DBILIRUBIN\", \"GAMMAGT\", \"AMYLASE\", \"LIPASE\", \"ALB\", \"PREALBUMIN\", \"GLUCOSE\" in the last 72 hours.      No results for input(s): \"NTPROBNP\" in the last 72 hours.      No results for input(s): \"TROPONINT\" in the last 72 hours.    Imaging:  OUTSIDE IMAGES-DX CHEST   Final Result      EC-ECHOCARDIOGRAM COMPLETE W/O CONT    (Results Pending)       EKG:  My impression is: Sinus rhythm, heart rate 71, no T/ST changes    Assessment/Plan:  Justification for Admission Status  I anticipate this patient will require at least two midnights for appropriate medical management, necessitating inpatient admission because NSTEMI    Patient will need a Telemetry bed on MEDICAL service .  The need is secondary to NSTEMI.    * NSTEMI (non-ST elevated myocardial infarction) (HCC)- (present on admission)  Assessment & Plan  67-year-old male with morbid obesity, uncontrolled hypertension, presented with " typical chest pain, elevated troponin without ST/T changes on EKG  Plan: Repeat troponin and EKG, monitor on telemetry  Resume IV heparin drip  Aspirin, Lipitor  Start on metoprolol for blood pressure control and likely CAD and possible ischemic cardiomyopathy  Transthoracic echo  Will plan to consult cardiology in a.m. for coronary angiography versus stress test depending on troponin trend      Cough  Assessment & Plan  Chest x-ray: No acute abnormalities  COVID-19/influenza/RSV screen  Guaifenesin as needed    BMI 38- (present on admission)  Assessment & Plan  BMI 38    DEREK- (present on admission)  Assessment & Plan  He stated that he does not use CPAP  Compliance counseling    Essential hypertension- (present on admission)  Assessment & Plan  Uncontrolled, noncompliant with amlodipine  Will start on metoprolol for cardioprotection benefit, titrate and to be discharged on Toprol  Consider adding lisinopril, monitor blood pressure        Dyslipidemia- (present on admission)  Assessment & Plan  Lipitor    Mild intermittent asthma without complication- (present on admission)  Assessment & Plan  Albuterol as needed        VTE prophylaxis: enoxaparin ppx

## 2023-12-06 NOTE — ASSESSMENT & PLAN NOTE
- Chest x-ray: No acute abnormalities. COVID-19/influenza/RSV screen negative.  - Mentions some sinus congestion, along with occasional prior heartburn. Post nasal drip vs GERD    - Guaifenesin as needed

## 2023-12-06 NOTE — PROGRESS NOTES
Telemetry Report:    Rhythm: SB to SR  Heart Rate: 59 to 68  Ectopy:    AK: 0.18  QRS: 0.06  QT: 0.37          Per telemetry room monitor

## 2023-12-07 ENCOUNTER — APPOINTMENT (OUTPATIENT)
Dept: RADIOLOGY | Facility: MEDICAL CENTER | Age: 68
DRG: 232 | End: 2023-12-07
Attending: NURSE PRACTITIONER
Payer: MEDICARE

## 2023-12-07 ENCOUNTER — ANESTHESIA EVENT (OUTPATIENT)
Dept: SURGERY | Facility: MEDICAL CENTER | Age: 68
DRG: 232 | End: 2023-12-07
Payer: MEDICARE

## 2023-12-07 ENCOUNTER — APPOINTMENT (OUTPATIENT)
Dept: CARDIOLOGY | Facility: MEDICAL CENTER | Age: 68
DRG: 232 | End: 2023-12-07
Attending: NURSE PRACTITIONER
Payer: MEDICARE

## 2023-12-07 PROBLEM — I25.10 CORONARY ARTERY DISEASE: Status: ACTIVE | Noted: 2023-12-07

## 2023-12-07 LAB
ABO + RH BLD: NORMAL
ABO GROUP BLD: NORMAL
ALBUMIN SERPL BCP-MCNC: 4 G/DL (ref 3.2–4.9)
ALBUMIN/GLOB SERPL: 1.2 G/DL
ALP SERPL-CCNC: 66 U/L (ref 30–99)
ALT SERPL-CCNC: 15 U/L (ref 2–50)
ANION GAP SERPL CALC-SCNC: 10 MMOL/L (ref 7–16)
ANION GAP SERPL CALC-SCNC: 11 MMOL/L (ref 7–16)
APPEARANCE UR: CLEAR
APTT PPP: 68.9 SEC (ref 24.7–36)
AST SERPL-CCNC: 11 U/L (ref 12–45)
BACTERIA #/AREA URNS HPF: NEGATIVE /HPF
BASOPHILS # BLD AUTO: 0.8 % (ref 0–1.8)
BASOPHILS # BLD: 0.07 K/UL (ref 0–0.12)
BILIRUB SERPL-MCNC: 0.3 MG/DL (ref 0.1–1.5)
BILIRUB UR QL STRIP.AUTO: NEGATIVE
BLD GP AB SCN SERPL QL: NORMAL
BUN SERPL-MCNC: 13 MG/DL (ref 8–22)
BUN SERPL-MCNC: 15 MG/DL (ref 8–22)
CALCIUM ALBUM COR SERPL-MCNC: 9.3 MG/DL (ref 8.5–10.5)
CALCIUM SERPL-MCNC: 8.5 MG/DL (ref 8.5–10.5)
CALCIUM SERPL-MCNC: 9.3 MG/DL (ref 8.5–10.5)
CHLORIDE SERPL-SCNC: 102 MMOL/L (ref 96–112)
CHLORIDE SERPL-SCNC: 104 MMOL/L (ref 96–112)
CO2 SERPL-SCNC: 20 MMOL/L (ref 20–33)
CO2 SERPL-SCNC: 21 MMOL/L (ref 20–33)
COLOR UR: YELLOW
CREAT SERPL-MCNC: 0.92 MG/DL (ref 0.5–1.4)
CREAT SERPL-MCNC: 0.98 MG/DL (ref 0.5–1.4)
EKG IMPRESSION: NORMAL
EOSINOPHIL # BLD AUTO: 0.12 K/UL (ref 0–0.51)
EOSINOPHIL NFR BLD: 1.3 % (ref 0–6.9)
EPI CELLS #/AREA URNS HPF: NEGATIVE /HPF
ERYTHROCYTE [DISTWIDTH] IN BLOOD BY AUTOMATED COUNT: 44.9 FL (ref 35.9–50)
ERYTHROCYTE [DISTWIDTH] IN BLOOD BY AUTOMATED COUNT: 45.3 FL (ref 35.9–50)
GFR SERPLBLD CREATININE-BSD FMLA CKD-EPI: 84 ML/MIN/1.73 M 2
GFR SERPLBLD CREATININE-BSD FMLA CKD-EPI: 91 ML/MIN/1.73 M 2
GLOBULIN SER CALC-MCNC: 3.4 G/DL (ref 1.9–3.5)
GLUCOSE SERPL-MCNC: 113 MG/DL (ref 65–99)
GLUCOSE SERPL-MCNC: 96 MG/DL (ref 65–99)
GLUCOSE UR STRIP.AUTO-MCNC: NEGATIVE MG/DL
HCT VFR BLD AUTO: 53.1 % (ref 42–52)
HCT VFR BLD AUTO: 55.6 % (ref 42–52)
HGB BLD-MCNC: 17.6 G/DL (ref 14–18)
HGB BLD-MCNC: 18.7 G/DL (ref 14–18)
HYALINE CASTS #/AREA URNS LPF: ABNORMAL /LPF
IMM GRANULOCYTES # BLD AUTO: 0.03 K/UL (ref 0–0.11)
IMM GRANULOCYTES NFR BLD AUTO: 0.3 % (ref 0–0.9)
INR PPP: 0.97 (ref 0.87–1.13)
KETONES UR STRIP.AUTO-MCNC: 15 MG/DL
LEUKOCYTE ESTERASE UR QL STRIP.AUTO: NEGATIVE
LV EJECT FRACT  99904: 55
LYMPHOCYTES # BLD AUTO: 2.73 K/UL (ref 1–4.8)
LYMPHOCYTES NFR BLD: 30 % (ref 22–41)
MCH RBC QN AUTO: 30.3 PG (ref 27–33)
MCH RBC QN AUTO: 30.9 PG (ref 27–33)
MCHC RBC AUTO-ENTMCNC: 33.1 G/DL (ref 32.3–36.5)
MCHC RBC AUTO-ENTMCNC: 33.6 G/DL (ref 32.3–36.5)
MCV RBC AUTO: 91.4 FL (ref 81.4–97.8)
MCV RBC AUTO: 91.9 FL (ref 81.4–97.8)
MICRO URNS: ABNORMAL
MONOCYTES # BLD AUTO: 0.81 K/UL (ref 0–0.85)
MONOCYTES NFR BLD AUTO: 8.9 % (ref 0–13.4)
NEUTROPHILS # BLD AUTO: 5.33 K/UL (ref 1.82–7.42)
NEUTROPHILS NFR BLD: 58.7 % (ref 44–72)
NITRITE UR QL STRIP.AUTO: NEGATIVE
NRBC # BLD AUTO: 0 K/UL
NRBC BLD-RTO: 0 /100 WBC (ref 0–0.2)
PH UR STRIP.AUTO: 5.5 [PH] (ref 5–8)
PLATELET # BLD AUTO: 329 K/UL (ref 164–446)
PLATELET # BLD AUTO: 343 K/UL (ref 164–446)
PMV BLD AUTO: 9 FL (ref 9–12.9)
PMV BLD AUTO: 9.1 FL (ref 9–12.9)
POTASSIUM SERPL-SCNC: 4.1 MMOL/L (ref 3.6–5.5)
POTASSIUM SERPL-SCNC: 4.4 MMOL/L (ref 3.6–5.5)
PROT SERPL-MCNC: 7.4 G/DL (ref 6–8.2)
PROT UR QL STRIP: 100 MG/DL
PROTHROMBIN TIME: 12.9 SEC (ref 12–14.6)
RBC # BLD AUTO: 5.81 M/UL (ref 4.7–6.1)
RBC # BLD AUTO: 6.05 M/UL (ref 4.7–6.1)
RBC # URNS HPF: ABNORMAL /HPF
RBC UR QL AUTO: NEGATIVE
RH BLD: NORMAL
SODIUM SERPL-SCNC: 134 MMOL/L (ref 135–145)
SODIUM SERPL-SCNC: 134 MMOL/L (ref 135–145)
SP GR UR STRIP.AUTO: 1.01
UFH PPP CHRO-ACNC: 0.37 IU/ML
UFH PPP CHRO-ACNC: 0.46 IU/ML
UROBILINOGEN UR STRIP.AUTO-MCNC: 0.2 MG/DL
WBC # BLD AUTO: 8.9 K/UL (ref 4.8–10.8)
WBC # BLD AUTO: 9.1 K/UL (ref 4.8–10.8)
WBC #/AREA URNS HPF: ABNORMAL /HPF

## 2023-12-07 PROCEDURE — 85520 HEPARIN ASSAY: CPT

## 2023-12-07 PROCEDURE — 71046 X-RAY EXAM CHEST 2 VIEWS: CPT

## 2023-12-07 PROCEDURE — 80048 BASIC METABOLIC PNL TOTAL CA: CPT

## 2023-12-07 PROCEDURE — 99232 SBSQ HOSP IP/OBS MODERATE 35: CPT | Mod: GC | Performed by: INTERNAL MEDICINE

## 2023-12-07 PROCEDURE — A9270 NON-COVERED ITEM OR SERVICE: HCPCS | Performed by: INTERNAL MEDICINE

## 2023-12-07 PROCEDURE — 700102 HCHG RX REV CODE 250 W/ 637 OVERRIDE(OP): Performed by: NURSE PRACTITIONER

## 2023-12-07 PROCEDURE — A9270 NON-COVERED ITEM OR SERVICE: HCPCS | Performed by: NURSE PRACTITIONER

## 2023-12-07 PROCEDURE — 770022 HCHG ROOM/CARE - ICU (200)

## 2023-12-07 PROCEDURE — 93880 EXTRACRANIAL BILAT STUDY: CPT

## 2023-12-07 PROCEDURE — 86901 BLOOD TYPING SEROLOGIC RH(D): CPT

## 2023-12-07 PROCEDURE — 85025 COMPLETE CBC W/AUTO DIFF WBC: CPT

## 2023-12-07 PROCEDURE — 86900 BLOOD TYPING SEROLOGIC ABO: CPT

## 2023-12-07 PROCEDURE — 99223 1ST HOSP IP/OBS HIGH 75: CPT | Mod: 57 | Performed by: THORACIC SURGERY (CARDIOTHORACIC VASCULAR SURGERY)

## 2023-12-07 PROCEDURE — 85730 THROMBOPLASTIN TIME PARTIAL: CPT

## 2023-12-07 PROCEDURE — 700111 HCHG RX REV CODE 636 W/ 250 OVERRIDE (IP): Performed by: INTERNAL MEDICINE

## 2023-12-07 PROCEDURE — 93306 TTE W/DOPPLER COMPLETE: CPT | Mod: 26 | Performed by: INTERNAL MEDICINE

## 2023-12-07 PROCEDURE — 700102 HCHG RX REV CODE 250 W/ 637 OVERRIDE(OP): Performed by: INTERNAL MEDICINE

## 2023-12-07 PROCEDURE — 85027 COMPLETE CBC AUTOMATED: CPT

## 2023-12-07 PROCEDURE — 80053 COMPREHEN METABOLIC PANEL: CPT

## 2023-12-07 PROCEDURE — 99233 SBSQ HOSP IP/OBS HIGH 50: CPT | Performed by: INTERNAL MEDICINE

## 2023-12-07 PROCEDURE — 86850 RBC ANTIBODY SCREEN: CPT

## 2023-12-07 PROCEDURE — 93306 TTE W/DOPPLER COMPLETE: CPT

## 2023-12-07 PROCEDURE — 93005 ELECTROCARDIOGRAM TRACING: CPT | Performed by: NURSE PRACTITIONER

## 2023-12-07 PROCEDURE — 81001 URINALYSIS AUTO W/SCOPE: CPT

## 2023-12-07 PROCEDURE — 93010 ELECTROCARDIOGRAM REPORT: CPT | Performed by: INTERNAL MEDICINE

## 2023-12-07 PROCEDURE — 85610 PROTHROMBIN TIME: CPT

## 2023-12-07 PROCEDURE — 93970 EXTREMITY STUDY: CPT

## 2023-12-07 RX ORDER — ALPRAZOLAM 0.25 MG/1
0.25 TABLET ORAL EVERY 6 HOURS PRN
Status: DISCONTINUED | OUTPATIENT
Start: 2023-12-08 | End: 2023-12-08

## 2023-12-07 RX ORDER — ACETAMINOPHEN 500 MG
1000 TABLET ORAL ONCE
Status: COMPLETED | OUTPATIENT
Start: 2023-12-08 | End: 2023-12-08

## 2023-12-07 RX ADMIN — CITALOPRAM HYDROBROMIDE 30 MG: 20 TABLET ORAL at 05:04

## 2023-12-07 RX ADMIN — CARVEDILOL 6.25 MG: 6.25 TABLET, FILM COATED ORAL at 16:39

## 2023-12-07 RX ADMIN — FLUTICASONE PROPIONATE 110 MCG: 110 AEROSOL, METERED RESPIRATORY (INHALATION) at 08:54

## 2023-12-07 RX ADMIN — DOCUSATE SODIUM 50 MG AND SENNOSIDES 8.6 MG 2 TABLET: 8.6; 5 TABLET, FILM COATED ORAL at 05:05

## 2023-12-07 RX ADMIN — DOCUSATE SODIUM 50 MG AND SENNOSIDES 8.6 MG 2 TABLET: 8.6; 5 TABLET, FILM COATED ORAL at 16:39

## 2023-12-07 RX ADMIN — ATORVASTATIN CALCIUM 80 MG: 80 TABLET, FILM COATED ORAL at 16:39

## 2023-12-07 RX ADMIN — LOSARTAN POTASSIUM 25 MG: 50 TABLET, FILM COATED ORAL at 05:04

## 2023-12-07 RX ADMIN — HEPARIN SODIUM 20 UNITS/KG/HR: 5000 INJECTION, SOLUTION INTRAVENOUS at 09:08

## 2023-12-07 RX ADMIN — CARVEDILOL 6.25 MG: 6.25 TABLET, FILM COATED ORAL at 08:54

## 2023-12-07 RX ADMIN — FLUTICASONE PROPIONATE 110 MCG: 110 AEROSOL, METERED RESPIRATORY (INHALATION) at 19:40

## 2023-12-07 RX ADMIN — ASPIRIN 81 MG: 81 TABLET, COATED ORAL at 05:05

## 2023-12-07 RX ADMIN — POLYETHYLENE GLYCOL 3350 1 PACKET: 17 POWDER, FOR SOLUTION ORAL at 16:39

## 2023-12-07 ASSESSMENT — ENCOUNTER SYMPTOMS
CHILLS: 1
WEAKNESS: 0
DOUBLE VISION: 0
EYES NEGATIVE: 1
MYALGIAS: 0
CONSTIPATION: 0
SPUTUM PRODUCTION: 1
SHORTNESS OF BREATH: 0
DIAPHORESIS: 1
DIARRHEA: 0
GASTROINTESTINAL NEGATIVE: 1
VOMITING: 0
SORE THROAT: 0
ORTHOPNEA: 0
COUGH: 1
COUGH: 0
LOSS OF CONSCIOUSNESS: 0
WEIGHT LOSS: 0
SPUTUM PRODUCTION: 0
BLOOD IN STOOL: 0
DIZZINESS: 0
MUSCULOSKELETAL NEGATIVE: 1
BLURRED VISION: 0
PSYCHIATRIC NEGATIVE: 1
SEIZURES: 0
WHEEZING: 0
FEVER: 0
PALPITATIONS: 0
NAUSEA: 0
ABDOMINAL PAIN: 0
CHILLS: 0
NEUROLOGICAL NEGATIVE: 1

## 2023-12-07 ASSESSMENT — PAIN DESCRIPTION - PAIN TYPE: TYPE: ACUTE PAIN

## 2023-12-07 ASSESSMENT — FIBROSIS 4 INDEX
FIB4 SCORE: 0.75
FIB4 SCORE: 0.55

## 2023-12-07 NOTE — PROCEDURES
Cardiac Catheterization Laboratory Procedure Note    DATE: 12/6/2023    : Ananda Stratton MD, MPH    PROCEDURES PERFORMED:  Left heart catheterization  Coronary angiography  Moderate conscious sedation    INDICATIONS:  NSTEMI    CONSENT:  The complete alternatives, risks, and benefits of the procedure were explained to the patient. Informed consent was obtained prior to the procedure.    MEDICATIONS:  Lidocaine  Fentanyl  Midazolam  Nitroglycerin  Verapamil  Heparin    MODERATE CONSCIOUS SEDATION:  I personally supervised the administration of moderate conscious sedation by the nursing staff for 15 minutes.  Start time: 1541  End time: 1556    CONTRAST: Omnipaque 30 cc    RADIATION DOSE (Air Kerma): 173 mGy    FLUOROSCOPY TIME: 2.3 minutes    ACCESS:  6-Malawian Glidesheath in the right radial artery    ESTIMATED BLOOD LOSS: <10 cc    COMPLICATIONS: None    PROCEDURE IN DETAIL:  The patient was brought to the cardiac catheterization laboratory in the fasting state. The skin over the right wrist was prepped and draped in the usual sterile fashion. Lidocaine infiltration was used to anesthetize the tissue over the right radial artery. Using the micropuncture technique, a 6-Malawian Glidesheath was inserted in the right radial artery. A 6 Fr JR4 diagnostic catheter was then advanced over a standard J-wire into the left ventricular cavity where it was gently aspirated, flushed, and then withdrawn across the aortic valve with sequential pressures measured. This catheter was then used to engage the ostium of the right coronary artery and cineangiograms were obtained in multiple projections for complete evaluation of the right coronary system. This catheter was then exchanged over a J-wire to a 6 Malawian JL 3.5 diagnostic catheter which was used to engage the ostium of the left coronary artery and cineangiograms were obtained in multiple projections for complete evaluation of the total left coronary system. Following  completion of coronary angiography, all wires, catheters, and sheaths were removed.  A TR band was placed over the right wrist using the patent hemostasis technique.     HEMODYNAMICS:  Aortic pressure: 119 /83 mmHg  LVEDP: 25 mmHg  No significant aortic gradient on pullback    Left ventriculography:  Normal estimated LV systolic function, estimated LVEF 65%    CORONARY ANGIOGRAPHY:  The left main coronary artery : Large in caliber vessel with mild CAD, bifurcates to LAD and left circumflex  The left anterior descending coronary artery : Large in caliber transapical vessel with severe 95% mid stenosis.  Gives rise to several diagonal branches  The left circumflex coronary artery : Large in caliber vessel that gives rise to small OM1 and large OM 2.  There is severe 95% stenoses throughout the proximal OM 2.  The right coronary artery  : Large-caliber dominant vessel with severe 70% mid stenosis, and a long segment of severe 80-90% distal stenosis, with EARLE I flow into the R-PDA with occluded PLB system, fills via left-right collaterals.    IMPRESSION:  1.  Severe multivessel epicardial CAD  2.  Normal estimated LVEF 65%  3.  Elevated resting LVEDP 25mmHg with no significant transaortic gradient on pullback      RECOMMENDATIONS:  Appreciate CT surgical consultation for CABG consideration.  Patient chest pain-free  Resume heparin drip ACS protocol and ongoing optimal secondary ASCVD preventive measures  TR band protocol    NOTIFICATION:  The patient opted no need for me to update any family or friends at this time.    Referring Cardiologist - Dr. Gray - was notified.    Ananda Stratton MD, MPH [facility]  Interventional Cardiologist  [facility]   of Clinical Internal Medicine - [facility] [place]

## 2023-12-07 NOTE — PROGRESS NOTES
Interval:  No acute events. Without cardiac complaints. He is eager to return to work.     Medications / Drug list prior to admission:  No current facility-administered medications on file prior to encounter.     Current Outpatient Medications on File Prior to Encounter   Medication Sig Dispense Refill    Citalopram Hydrobromide 30 MG Cap Take 30 mg by mouth every day.      TESTOSTERONE CYPIONATE IM Inject 1 Each into the shoulder, thigh, or buttocks every 7 days. Indications: for low testosterone      amLODIPine (NORVASC) 5 MG Tab Take 5 mg by mouth every day.      Albuterol Sulfate 108 (90 BASE) MCG/ACT AEROSOL POWDER, BREATH ACTIVATED Inhale 2 Puffs 2 times a day as needed.      lovastatin (MEVACOR) 40 MG tablet Take 40 mg by mouth every evening.      mometasone (ASMANEX) 220 MCG/INH inhaler Inhale 2 Puffs by mouth every day.         Current list of administered Medications:    Current Facility-Administered Medications:     [START ON 12/8/2023] acetaminophen (Tylenol) tablet 1,000 mg, 1,000 mg, Oral, Once, Brent Gutierrez A.P.R.N.    [START ON 12/8/2023] Cardiac Surgery Prophylactic Antibiotics per Pharmacy, , Other, PHARMACY TO DOSE, Brent Gutierrez A.P.R.N.    [START ON 12/8/2023] lidocaine (Xylocaine) 1 % injection 0.5 mL, 0.5 mL, Intradermal, Once PRN, Brent Gutierrez A.P.R.N.    [START ON 12/8/2023] metoprolol tartrate (Lopressor) tablet 12.5 mg, 12.5 mg, Oral, Once, Brent Gutierrez A.P.R.N.    [START ON 12/8/2023] ALPRAZolam (Xanax) tablet 0.25 mg, 0.25 mg, Oral, Q6HRS PRN, Brent Gutierrez A.P.R.N.    carvedilol (Coreg) tablet 6.25 mg, 6.25 mg, Oral, BID WITH MEALS, Brent Gutierrez A.P.R.N., 6.25 mg at 12/07/23 0854    senna-docusate (Pericolace Or Senokot S) 8.6-50 MG per tablet 2 Tablet, 2 Tablet, Oral, BID, 2 Tablet at 12/07/23 0505 **AND** polyethylene glycol/lytes (Miralax) Packet 1 Packet, 1 Packet, Oral, QDAY PRN **AND** magnesium hydroxide (Milk Of Magnesia) suspension 30 mL, 30 mL, Oral, QDAY PRN **AND**  bisacodyl (Dulcolax) suppository 10 mg, 10 mg, Rectal, QDAY PRN, Jn Alonzo M.D.    acetaminophen (Tylenol) tablet 650 mg, 650 mg, Oral, Q6HRS PRN, Jn Alonzo M.D.    Notify provider if pain remains uncontrolled, , , CONTINUOUS **AND** Use the Numeric Rating Scale (NRS), Sánchez-Baker Faces (WBF), or FLACC on regular floors and Critical-Care Pain Observation Tool (CPOT) on ICUs/Trauma to assess pain, , , CONTINUOUS **AND** Pulse Ox, , , CONTINUOUS **AND** Pharmacy Consult Request ...Pain Management Review 1 Each, 1 Each, Other, PHARMACY TO DOSE **AND** If patient difficult to arouse and/or has respiratory depression (respiratory rate of 10 or less), stop any opiates that are currently infusing and call a Rapid Response., , , CONTINUOUS, Jn Alonzo M.D.    oxyCODONE immediate-release (Roxicodone) tablet 5 mg, 5 mg, Oral, Q3HRS PRN **OR** oxyCODONE immediate release (Roxicodone) tablet 10 mg, 10 mg, Oral, Q3HRS PRN **OR** morphine 4 MG/ML injection 4 mg, 4 mg, Intravenous, Q3HRS PRN, Jn Alonzo M.D.    labetalol (Normodyne/Trandate) injection 10 mg, 10 mg, Intravenous, Q4HRS PRN, Jn Alonzo M.D.    ondansetron (Zofran) syringe/vial injection 4 mg, 4 mg, Intravenous, Q4HRS PRN, Jn Alonzo M.D.    ondansetron (Zofran ODT) dispertab 4 mg, 4 mg, Oral, Q4HRS PRN, Jn Alonzo M.D.    [Held by provider] aspirin EC tablet 81 mg, 81 mg, Oral, DAILY, Jn Alonzo M.D., 81 mg at 12/07/23 0505    atorvastatin (Lipitor) tablet 80 mg, 80 mg, Oral, Q EVENING, Jn Alonzo M.D., 80 mg at 12/06/23 1701    nitroglycerin (Nitrostat) tablet 0.4 mg, 0.4 mg, Sublingual, Q5 MIN PRN, Jn Alonzo M.D.    ipratropium-albuterol (DUONEB) nebulizer solution, 3 mL, Nebulization, Q4H PRN (RT), Jn Alonzo M.D.    morphine 4 MG/ML injection 2-4 mg, 2-4 mg, Intravenous, Q5 MIN PRN, Jn Alonzo M.D.    citalopram (CeleXA) tablet 30 mg, 30 mg, Oral, DAILY, Jn Alonzo M.D.,  30 mg at 23 0504    fluticasone (Flovent HFA) 110 MCG/ACT inhaler 110 mcg, 1 Puff, Inhalation, BID (RT), Jn Alonzo M.D., 110 mcg at 23 0854    heparin infusion 25,000 units in 500 mL 0.45% NACL, 0-30 Units/kg/hr (Adjusted), Intravenous, Continuous, LEIGH Loving, Last Rate: 34.6 mL/hr at 23 1409, 20 Units/kg/hr at 23 1409    heparin injection 2,000 Units, 2,000 Units, Intravenous, PRN, Jn Alonzo M.D., 2,000 Units at 23 2350    guaiFENesin dextromethorphan (Robitussin DM) 100-10 MG/5ML syrup 5 mL, 5 mL, Oral, Q6HRS PRN, Jn Alonzo M.D.    Past Medical History:   Diagnosis Date    Chickenpox     Spanish measles     Influenza     Mumps     Sleep apnea        Past Surgical History:   Procedure Laterality Date    OTHER  [id number]    Mastoidectomy left ear       Family History   Problem Relation Age of Onset    Sleep Apnea Brother      Patient family history was personally reviewed, no pertinent family history to current presentation    Social History     Socioeconomic History    Marital status:      Spouse name: Not on file    Number of children: Not on file    Years of education: Not on file    Highest education level: Not on file   Occupational History    Not on file   Tobacco Use    Smoking status: Former     Current packs/day: 0.00     Average packs/day: 1 pack/day for 30.0 years (30.0 ttl pk-yrs)     Types: Cigarettes     Start date: 1971     Quit date: 2001     Years since quittin.9    Smokeless tobacco: Never   Vaping Use    Vaping Use: Never used   Substance and Sexual Activity    Alcohol use: No    Drug use: No    Sexual activity: Not on file   Other Topics Concern    Not on file   Social History Narrative    Not on file     Social Determinants of Health     Financial Resource Strain: Not on file   Food Insecurity: Not on file   Transportation Needs: Not on file   Physical Activity: Not on file   Stress: Not on file   Social  Connections: Not on file   Intimate Partner Violence: Not on file   Housing Stability: Not on file       ALLERGIES:  No Known Allergies    Review of systems:  A complete review of symptoms was reviewed with patient. This is reviewed in H&P and PMH. ALL OTHERS reviewed and negative    Physical exam:  Vitals:    12/07/23 0400 12/07/23 0401 12/07/23 0733 12/07/23 1202   BP: 108/59  128/72 130/74   Pulse: 60  68 69   Resp: 16  17 17   Temp: 36.5 °C (97.7 °F)  36.8 °C (98.2 °F) 37.1 °C (98.8 °F)   TempSrc: Temporal  Temporal Temporal   SpO2: 94%  94% 94%   Weight:  111 kg (245 lb 9.5 oz)     Height:            General: No acute distress.   EYES: no jaundice  HEENT: OP clear   Neck: No bruits No JVD.   CVS: RRR. S1 + S2. No M/R/G. No edema.  Resp: CTAB. No wheezing or crackles/rhonchi.  Abdomen: Soft, NT, ND,  Skin: Grossly nothing acute no obvious rashes  Neurological: Alert, Moves all extremities, no cranial nerve defects on limited exam  Extremities: Pulse 2+ in b/l LE. No cyanosis.     Data:  Laboratory studies personally reviewed by me:  Recent Results (from the past 24 hour(s))   POCT activated clotting time device results    Collection Time: 12/06/23  3:50 PM   Result Value Ref Range    Istat Activated Clotting Time 147 (H) 74 - 137 sec   Heparin Xa (Unfractionated)    Collection Time: 12/06/23 10:28 PM   Result Value Ref Range    Heparin Xa (UFH) 0.23 IU/mL   CBC WITHOUT DIFFERENTIAL    Collection Time: 12/07/23  2:02 AM   Result Value Ref Range    WBC 8.9 4.8 - 10.8 K/uL    RBC 5.81 4.70 - 6.10 M/uL    Hemoglobin 17.6 14.0 - 18.0 g/dL    Hematocrit 53.1 (H) 42.0 - 52.0 %    MCV 91.4 81.4 - 97.8 fL    MCH 30.3 27.0 - 33.0 pg    MCHC 33.1 32.3 - 36.5 g/dL    RDW 44.9 35.9 - 50.0 fL    Platelet Count 329 164 - 446 K/uL    MPV 9.1 9.0 - 12.9 fL   Basic Metabolic Panel    Collection Time: 12/07/23  2:02 AM   Result Value Ref Range    Sodium 134 (L) 135 - 145 mmol/L    Potassium 4.1 3.6 - 5.5 mmol/L    Chloride 102 96  - 112 mmol/L    Co2 21 20 - 33 mmol/L    Glucose 96 65 - 99 mg/dL    Bun 15 8 - 22 mg/dL    Creatinine 0.98 0.50 - 1.40 mg/dL    Calcium 8.5 8.5 - 10.5 mg/dL    Anion Gap 11.0 7.0 - 16.0   ESTIMATED GFR    Collection Time: 12/07/23  2:02 AM   Result Value Ref Range    GFR (CKD-EPI) 84 >60 mL/min/1.73 m 2   Heparin Xa (Unfractionated)    Collection Time: 12/07/23  6:08 AM   Result Value Ref Range    Heparin Xa (UFH) 0.46 IU/mL   Heparin Xa (Unfractionated)    Collection Time: 12/07/23 12:05 PM   Result Value Ref Range    Heparin Xa (UFH) 0.37 IU/mL   Prothrombin time (INR)    Collection Time: 12/07/23 12:05 PM   Result Value Ref Range    PT 12.9 12.0 - 14.6 sec    INR 0.97 0.87 - 1.13   APTT (PTT)    Collection Time: 12/07/23 12:05 PM   Result Value Ref Range    APTT 68.9 (H) 24.7 - 36.0 sec   Comp Metabolic Panel (CMP)    Collection Time: 12/07/23 12:59 PM   Result Value Ref Range    Sodium 134 (L) 135 - 145 mmol/L    Potassium 4.4 3.6 - 5.5 mmol/L    Chloride 104 96 - 112 mmol/L    Co2 20 20 - 33 mmol/L    Anion Gap 10.0 7.0 - 16.0    Glucose 113 (H) 65 - 99 mg/dL    Bun 13 8 - 22 mg/dL    Creatinine 0.92 0.50 - 1.40 mg/dL    Calcium 9.3 8.5 - 10.5 mg/dL    Correct Calcium 9.3 8.5 - 10.5 mg/dL    AST(SGOT) 11 (L) 12 - 45 U/L    ALT(SGPT) 15 2 - 50 U/L    Alkaline Phosphatase 66 30 - 99 U/L    Total Bilirubin 0.3 0.1 - 1.5 mg/dL    Albumin 4.0 3.2 - 4.9 g/dL    Total Protein 7.4 6.0 - 8.2 g/dL    Globulin 3.4 1.9 - 3.5 g/dL    A-G Ratio 1.2 g/dL   CBC with Differential    Collection Time: 12/07/23 12:59 PM   Result Value Ref Range    WBC 9.1 4.8 - 10.8 K/uL    RBC 6.05 4.70 - 6.10 M/uL    Hemoglobin 18.7 (H) 14.0 - 18.0 g/dL    Hematocrit 55.6 (H) 42.0 - 52.0 %    MCV 91.9 81.4 - 97.8 fL    MCH 30.9 27.0 - 33.0 pg    MCHC 33.6 32.3 - 36.5 g/dL    RDW 45.3 35.9 - 50.0 fL    Platelet Count 343 164 - 446 K/uL    MPV 9.0 9.0 - 12.9 fL    Neutrophils-Polys 58.70 44.00 - 72.00 %    Lymphocytes 30.00 22.00 - 41.00 %     Monocytes 8.90 0.00 - 13.40 %    Eosinophils 1.30 0.00 - 6.90 %    Basophils 0.80 0.00 - 1.80 %    Immature Granulocytes 0.30 0.00 - 0.90 %    Nucleated RBC 0.00 0.00 - 0.20 /100 WBC    Neutrophils (Absolute) 5.33 1.82 - 7.42 K/uL    Lymphs (Absolute) 2.73 1.00 - 4.80 K/uL    Monos (Absolute) 0.81 0.00 - 0.85 K/uL    Eos (Absolute) 0.12 0.00 - 0.51 K/uL    Baso (Absolute) 0.07 0.00 - 0.12 K/uL    Immature Granulocytes (abs) 0.03 0.00 - 0.11 K/uL    NRBC (Absolute) 0.00 K/uL   COD (Adult)    Collection Time: 23 12:59 PM   Result Value Ref Range    ABO Grouping Only A     Rh Grouping Only POS     Antibody Screen-Cod NEG    ABO Rh Confirm    Collection Time: 23 12:59 PM   Result Value Ref Range    ABO Rh Confirm A POS    ESTIMATED GFR    Collection Time: 23 12:59 PM   Result Value Ref Range    GFR (CKD-EPI) 91 >60 mL/min/1.73 m 2   EKG    Collection Time: 23  2:30 PM   Result Value Ref Range    Report       [facility]    Test Date:  2023  Pt Name:    AMBER GARNER              Department: [facility]  MRN:        [id number]                      Room:       [facility]  Gender:     Male                         Technician: [facility]  :        1955                   Requested By:YANNA YOO  Order #:    [id number]                    Reading MD:    Measurements  Intervals                                Axis  Rate:       70                           P:          40  MD:         163                          QRS:        21  QRSD:       81                           T:          31  QT:         391  QTc:        422    Interpretive Statements  Sinus arrhythmia  Minimal ST elevation, anterior leads  Compared to ECG 2023 08:59:42  Sinus rhythm no longer present  ST (T wave) deviation still present         EKG 23 interpreted by me sinus, J point nonspecific elevation    All pertinent features of laboratory and imaging reviewed including primary images where applicable    [facility]  12/6/23  HEMODYNAMICS:  Aortic pressure: 119 /83 mmHg  LVEDP: 25 mmHg  No significant aortic gradient on pullback     Left ventriculography:  Normal estimated LV systolic function, estimated LVEF 65%     CORONARY ANGIOGRAPHY:  The left main coronary artery : Large in caliber vessel with mild CAD, bifurcates to LAD and left circumflex  The left anterior descending coronary artery : Large in caliber transapical vessel with severe 95% mid stenosis.  Gives rise to several diagonal branches  The left circumflex coronary artery : Large in caliber vessel that gives rise to small OM1 and large OM 2.  There is severe 95% stenoses throughout the proximal OM 2.  The right coronary artery  : Large-caliber dominant vessel with severe 70% mid stenosis, and a long segment of severe 80-90% distal stenosis, with EARLE I flow into the R-PDA with occluded PLB system, fills via left-right collaterals.     IMPRESSION:  1.  Severe multivessel epicardial CAD  2.  Normal estimated LVEF 65%  3.  Elevated resting LVEDP 25mmHg with no significant transaortic gradient on pullback    Principal Problem:    NSTEMI (non-ST elevated myocardial infarction) (HCC) (POA: Yes)  Active Problems:    Mild intermittent asthma without complication (POA: Yes)    Dyslipidemia (POA: Yes)    Essential hypertension (POA: Yes)    DEREK (POA: Yes)    BMI 38 (POA: Yes)    Cough (POA: Unknown)  Resolved Problems:    * No resolved hospital problems. *      Assessment / Plan:  67 year old man with PMH HTN, DEREK, asthma, seasonal allergies, obesity, works in sanitation laborious job presents with chest pressure progressive found nstemi s/p LHC found multivessel CAD    -CTS for CABG  -aspirin and heparin gtt  -high intensity statin  -carvedilol and losartan. BP goal <130/80  -TTE    I personally discussed his case with Dr Son     It is my pleasure to participate in the care of Mr. Hammond.  Please do not hesitate to contact me with questions or concerns.    Terell Gray  MD  Cardiologist [facility]     A total of 55 minutes of time was spent on day of encounter reviewing medical record, performing history and examination, counseling, ordering medication/test/consults and documentation.

## 2023-12-07 NOTE — PROGRESS NOTES
Problem: Pre Op  Goal: Optimal preparation for CABG/Heart Valve surgery    Intervention: Pre Op education to patient. Provided patient with purple folder that includes the Education for heart surgery booklet, rehab flyer, home vitals logbook, and the 'normal's after heart surgery' packet.    I briefly reviewed and discussed anatomy and physiology of cardiac surgery for CABG X 1-3 with patient.  Visiting hours, phone numbers, morning of surgery visiting, and location for physician updates post op were reviewed.    Post op activities including the use of incentive spirometry with return demonstration, diet, early ambulation, including walking 4 times a day, up in the recliner 3 times a day for meals, cough and deep breathing with heart pillow, incentive spirometer use 10 times and hour, showering on POD # 3, pain control, sternal precautions & move within the tube, EVI hose, importance of daily weights, and expected length of stay. Also provided information on Cardiac Rehab, start date, length, location, general program information, and how to schedule an appointment. Patient and family state full understanding of all information given.    Intervention: Baseline assessment documented to include IS volume, social/home discharge situation.  Baseline IS: 2750    Intervention: NPO at midnight except cardiac medications cleared by MD, and the PowerAde drink. (No ASA, coumadin or Plavix)  Instructed patient that he will be allowed nothing to eat or drink after midnight the night prior to scheduled surgery date EXCEPT the PowerAde/Gatorade drink.    Intervention: Shower with chlorhexidine x 2  Instructed patient that nursing staff will assist with washing entire body with chlorhexidine wipes prior to bedtime the night before surgery and again in the morning of.    Chart was reviewed for blood thinners and ACE/ARB therapy that may need stop prior to surgery.    Chart was reviewed for A1C >8.5 or dysglycemia that may need  referral for optimization.    Education time was 40 minutes.

## 2023-12-07 NOTE — CARE PLAN
The patient is Stable - Low risk of patient condition declining or worsening    Shift Goals  Clinical Goals: heparin drip management, safety, I/O's  Patient Goals: eat dinner, rest  Family Goals: valentina    Progress made toward(s) clinical / shift goals:    Problem: Knowledge Deficit - Standard  Goal: Patient and family/care givers will demonstrate understanding of plan of care, disease process/condition, diagnostic tests and medications  Outcome: Progressing  Note: Updated pt on POC. Answered all questions regarding Heparin drip changes.      Problem: Fall Risk  Goal: Patient will remain free from falls  Outcome: Progressing  Note: Appropriate fall precautions in place, pt remains free from falls this shift. Uses call light appropriately        Patient is not progressing towards the following goals:

## 2023-12-07 NOTE — PROGRESS NOTES
Report received, poc discussed, assumed care of pt.   Call light in reach, hourly rounding in place.   Pt gets up self.   Cardiac diet.  + void. LBM 12/5.   Denies pain.  No further needs.

## 2023-12-07 NOTE — PROGRESS NOTES
Pt returned to unit from cath lab. Resumed heparin at previous rate, per pharmacist. Xa to be drawn 6 hrs from start.

## 2023-12-07 NOTE — CARE PLAN
The patient is Stable - Low risk of patient condition declining or worsening    Shift Goals  Clinical Goals: heparin gtt, cath lab  Patient Goals: eat  Family Goals: not present    Progress made toward(s) clinical / shift goals:  Cath lab today. Hep gtt infusing.       Problem: Fall Risk  Goal: Patient will remain free from falls  Outcome: Progressing   -no fall this shift. Low fall risk. Pt calls prior to getting up. Socks on.     Problem: Respiratory  Goal: Patient will achieve/maintain optimum respiratory ventilation and gas exchange  Outcome: Progressing   -RA

## 2023-12-07 NOTE — DIETARY
Nutrition Services: Cardiac Education Consult   Day 2 of admit.  Edwin Hammond is a 67 y.o. male with admitting DX of NSTEMI (non-ST elevated myocardial infarction)     RD able to visit pt at bedside to provide heart healthy diet education. RD provided handout reinforcing adequate fruits, vegetables, whole grains, plant-based proteins, and lean animal protein. Pt verbalized understanding and politely declined need for additional education at this time.     No other education needs identified at this time.     Please re-consult RD as indicated.

## 2023-12-07 NOTE — H&P (VIEW-ONLY)
REFERRING PHYSICIAN: Ananda Stratton MD.     CONSULTING PHYSICIAN: Carolyn Davalos MD.    CHIEF COMPLAINT: Chest pain    HISTORY OF PRESENT ILLNESS: The patient is a 67 y.o. male with a past medical history of DEREK, morbid obesity, hypertension, asthma who presented to [facility] on 23 for substernal chest pain that had been going on for 5 days.  It does not radiate.  It is alleviated by rest and worsened with exertion.  He was transferred to [facility] for elevated troponins.  He was seen by cardiology and underwent a left heart catheterization which shows multivessel disease.  He has had some chest congestion and productive cough over the last week with associated chills.  He denies syncope, lower extremity edema.    He lives with his daughter, PAU, and grandchild. He declines having anyone present or on the phone for the consultation.    PAST MEDICAL HISTORY:   Past Medical History:   Diagnosis Date    Chickenpox     [place] measles     Influenza     Mumps     Sleep apnea        PAST SURGICAL HISTORY:   Past Surgical History:   Procedure Laterality Date    OTHER  [id number]    Mastoidectomy left ear       FAMILY HISTORY:   Family History   Problem Relation Age of Onset    Sleep Apnea Brother         SOCIAL HISTORY:   Social History     Socioeconomic History    Marital status:      Spouse name: Not on file    Number of children: Not on file    Years of education: Not on file    Highest education level: Not on file   Occupational History    Not on file   Tobacco Use    Smoking status: Former     Current packs/day: 0.00     Average packs/day: 1 pack/day for 30.0 years (30.0 ttl pk-yrs)     Types: Cigarettes     Start date: 1971     Quit date: 2001     Years since quittin.9    Smokeless tobacco: Never   Vaping Use    Vaping Use: Never used   Substance and Sexual Activity    Alcohol use: No    Drug use: No    Sexual activity: Not on file   Other Topics Concern    Not on file   Social History  Narrative    Not on file     Social Determinants of Health     Financial Resource Strain: Not on file   Food Insecurity: Not on file   Transportation Needs: Not on file   Physical Activity: Not on file   Stress: Not on file   Social Connections: Not on file   Intimate Partner Violence: Not on file   Housing Stability: Not on file       ALLERGIES:   No Known Allergies     CURRENT MEDICATIONS:     Current Facility-Administered Medications:     carvedilol (Coreg) tablet 6.25 mg, 6.25 mg, Oral, BID WITH MEALS, Terell Gray M.D., 6.25 mg at 12/07/23 0854    losartan (Cozaar) tablet 25 mg, 25 mg, Oral, Q DAY, Terell Gray M.D., 25 mg at 12/07/23 0504    senna-docusate (Pericolace Or Senokot S) 8.6-50 MG per tablet 2 Tablet, 2 Tablet, Oral, BID, 2 Tablet at 12/07/23 0505 **AND** polyethylene glycol/lytes (Miralax) Packet 1 Packet, 1 Packet, Oral, QDAY PRN **AND** magnesium hydroxide (Milk Of Magnesia) suspension 30 mL, 30 mL, Oral, QDAY PRN **AND** bisacodyl (Dulcolax) suppository 10 mg, 10 mg, Rectal, QDAY PRN, Jn Alonzo M.D.    acetaminophen (Tylenol) tablet 650 mg, 650 mg, Oral, Q6HRS PRN, Jn Alonzo M.D.    Notify provider if pain remains uncontrolled, , , CONTINUOUS **AND** Use the Numeric Rating Scale (NRS), Sánchez-Baker Faces (WBF), or FLACC on regular floors and Critical-Care Pain Observation Tool (CPOT) on ICUs/Trauma to assess pain, , , CONTINUOUS **AND** Pulse Ox, , , CONTINUOUS **AND** Pharmacy Consult Request ...Pain Management Review 1 Each, 1 Each, Other, PHARMACY TO DOSE **AND** If patient difficult to arouse and/or has respiratory depression (respiratory rate of 10 or less), stop any opiates that are currently infusing and call a Rapid Response., , , CONTINUOUS, Jn Alonzo M.D.    oxyCODONE immediate-release (Roxicodone) tablet 5 mg, 5 mg, Oral, Q3HRS PRN **OR** oxyCODONE immediate release (Roxicodone) tablet 10 mg, 10 mg, Oral, Q3HRS PRN **OR** morphine 4 MG/ML injection 4 mg, 4  mg, Intravenous, Q3HRS PRN, Jn Alonoz M.D.    labetalol (Normodyne/Trandate) injection 10 mg, 10 mg, Intravenous, Q4HRS PRN, Jn Alonzo M.D.    ondansetron (Zofran) syringe/vial injection 4 mg, 4 mg, Intravenous, Q4HRS PRN, Jn Alonzo M.D.    ondansetron (Zofran ODT) dispertab 4 mg, 4 mg, Oral, Q4HRS PRN, Jn Alonzo M.D.    aspirin EC tablet 81 mg, 81 mg, Oral, DAILY, Jn Alonzo M.D., 81 mg at 12/07/23 0505    atorvastatin (Lipitor) tablet 80 mg, 80 mg, Oral, Q EVENING, Jn Alonzo M.D., 80 mg at 12/06/23 1701    nitroglycerin (Nitrostat) tablet 0.4 mg, 0.4 mg, Sublingual, Q5 MIN PRN, Jn Alonzo M.D.    ipratropium-albuterol (DUONEB) nebulizer solution, 3 mL, Nebulization, Q4H PRN (RT), Jn Alonzo M.D.    morphine 4 MG/ML injection 2-4 mg, 2-4 mg, Intravenous, Q5 MIN PRN, Jn Alonzo M.D.    citalopram (CeleXA) tablet 30 mg, 30 mg, Oral, DAILY, Jn Alonzo M.D., 30 mg at 12/07/23 0504    fluticasone (Flovent HFA) 110 MCG/ACT inhaler 110 mcg, 1 Puff, Inhalation, BID (RT), Jn Alonzo M.D., 110 mcg at 12/07/23 0854    heparin infusion 25,000 units in 500 mL 0.45% NACL, 0-30 Units/kg/hr (Adjusted), Intravenous, Continuous, Jn Alonzo M.D., Last Rate: 34.6 mL/hr at 12/07/23 0720, 20 Units/kg/hr at 12/07/23 0720    heparin injection 2,000 Units, 2,000 Units, Intravenous, PRN, Jn Alonzo M.D., 2,000 Units at 12/06/23 2350    guaiFENesin dextromethorphan (Robitussin DM) 100-10 MG/5ML syrup 5 mL, 5 mL, Oral, Q6HRS PRN, Jn Alonzo M.D.     LABS REVIEWED:  Lab Results   Component Value Date/Time    SODIUM 134 (L) 12/07/2023 02:02 AM    POTASSIUM 4.1 12/07/2023 02:02 AM    CHLORIDE 102 12/07/2023 02:02 AM    CO2 21 12/07/2023 02:02 AM    GLUCOSE 96 12/07/2023 02:02 AM    BUN 15 12/07/2023 02:02 AM    CREATININE 0.98 12/07/2023 02:02 AM      Lab Results   Component Value Date/Time    PROTHROMBTM 12.8 12/05/2023 11:33 PM    INR 0.95  12/05/2023 11:33 PM      Lab Results   Component Value Date/Time    WBC 8.9 12/07/2023 02:02 AM    RBC 5.81 12/07/2023 02:02 AM    HEMOGLOBIN 17.6 12/07/2023 02:02 AM    HEMATOCRIT 53.1 (H) 12/07/2023 02:02 AM    MCV 91.4 12/07/2023 02:02 AM    MCH 30.3 12/07/2023 02:02 AM    MCHC 33.1 12/07/2023 02:02 AM    MPV 9.1 12/07/2023 02:02 AM    NEUTSPOLYS 56.50 12/05/2023 11:33 PM    LYMPHOCYTES 31.80 12/05/2023 11:33 PM    MONOCYTES 9.00 12/05/2023 11:33 PM    EOSINOPHILS 1.80 12/05/2023 11:33 PM    BASOPHILS 0.70 12/05/2023 11:33 PM        IMAGING REVIEWED AND INTERPRETED:    ECHOCARDIOGRAM: Pending    ANGIOGRAM: 12/6/23 [facility]: I agree with interpretation except that all targets are not large but extremely small; all target vessels are 1.5mm or less when compared to size of catheter. The OM2 and PDA are likely even smaller.    HEMODYNAMICS:  Aortic pressure: 119 /83 mmHg  LVEDP: 25 mmHg  No significant aortic gradient on pullback     Left ventriculography:  Normal estimated LV systolic function, estimated LVEF 65%     CORONARY ANGIOGRAPHY:  The left main coronary artery : Large in caliber vessel with mild CAD, bifurcates to LAD and left circumflex  The left anterior descending coronary artery : Large in caliber transapical vessel with severe 95% mid stenosis.  Gives rise to several diagonal branches  The left circumflex coronary artery : Large in caliber vessel that gives rise to small OM1 and large OM 2.  There is severe 95% stenoses throughout the proximal OM 2.  The right coronary artery  : Large-caliber dominant vessel with severe 70% mid stenosis, and a long segment of severe 80-90% distal stenosis, with EARLE I flow into the R-PDA with occluded PLB system, fills via left-right collaterals.     IMPRESSION:  1.  Severe multivessel epicardial CAD  2.  Normal estimated LVEF 65%  3.  Elevated resting LVEDP 25mmHg with no significant transaortic gradient on pullback        RECOMMENDATIONS:  Appreciate CT surgical  consultation for CABG consideration.  Patient chest pain-free  Resume heparin drip ACS protocol and ongoing optimal secondary ASCVD preventive measures  TR band protocol    REVIEW OF SYSTEMS:   Review of Systems   Constitutional:  Positive for chills and malaise/fatigue. Negative for weight loss.   HENT: Negative.     Eyes: Negative.    Respiratory:  Positive for cough and sputum production.    Cardiovascular:  Positive for chest pain.   Gastrointestinal: Negative.    Genitourinary: Negative.    Musculoskeletal: Negative.    Skin: Negative.    Neurological: Negative.    Endo/Heme/Allergies: Negative.    Psychiatric/Behavioral: Negative.           PHYSICAL EXAMINATION:   Physical Exam  Vitals reviewed.   Constitutional:       General: He is not in acute distress.     Appearance: Normal appearance. He is obese.   HENT:      Head: Normocephalic and atraumatic.      Right Ear: External ear normal.      Left Ear: External ear normal.      Nose: Nose normal.      Mouth/Throat:      Mouth: Mucous membranes are moist.   Eyes:      Extraocular Movements: Extraocular movements intact.      Conjunctiva/sclera: Conjunctivae normal.      Pupils: Pupils are equal, round, and reactive to light.   Cardiovascular:      Rate and Rhythm: Normal rate and regular rhythm.      Pulses: Normal pulses.   Pulmonary:      Effort: Pulmonary effort is normal.   Abdominal:      General: Abdomen is flat. There is no distension.      Palpations: Abdomen is soft.   Musculoskeletal:         General: Normal range of motion.      Cervical back: Normal range of motion.   Skin:     General: Skin is warm and dry.      Capillary Refill: Capillary refill takes less than 2 seconds.   Neurological:      General: No focal deficit present.      Mental Status: He is alert and oriented to person, place, and time.   Psychiatric:         Mood and Affect: Mood normal.         Behavior: Behavior normal.       /72   Pulse 68   Temp 36.8 °C (98.2 °F)  "(Temporal)   Resp 17   Ht 1.727 m (5' 7.99\")   Wt 111 kg (245 lb 9.5 oz)   SpO2 94%   BMI 37.35 kg/m²        IMPRESSION:  68 yo gentleman with known conditions of DEREK, morbid obesity, hypertension, asthma now with NSTEMI, multivessel CAD with VERY SMALL targets and what appears to be preserved LVEF on angio but no TTE completed yet.      PLAN:  I recommend CABG x1-3 this admission depending on whether vessels are graftable.     The procedure, its risks, benefits, potential complications and alternative treatments were discussed with the patient in detail including the risks should he decide not to undergo my recommended treatment. All of his questions were answered to his satisfaction and he is willing to proceed with the operation. The risks include death, stroke,  infection: to include a rare bacterial infection related to the use of the heart/lung machine, pillo-operative myocardial infarction, dysrhythmias, diaphragmatic paralysis, chest wall paresthesia, tracheostomy, kidney or other organ failure, possible return to the operating room for bleeding, bleeding requiring transfusion with its attendant risks including AIDS or hepatitis, dehiscence of surgical incisions, respiratory complications including the need for prolonged ventilator support, Protamine or other drug reaction, peripheral neuropathy, loss of limb, and miscount of surgical items. The operative mortality risk is approximately 1%. The STS mortality risk score is 1% and the morbidity and mortality risk score is 10%. The scores were discussed with patient.    The operation,CABG x1-3 is scheduled for Friday 12/8/23 at 7:30 AM at [facility].    Findings and recommendations have been discussed with the patient’s cardiologist Ananda Stratton.  Thank you for this very challenging consultation and participation in the patient’s care.  I will keep you apprised of all future developments.          Sincerely,       Carolyn Davalos, " MD.

## 2023-12-07 NOTE — CARE PLAN
The patient is Stable - Low risk of patient condition declining or worsening    Shift Goals  Clinical Goals: evaluation by CT surgery, mobilize  Patient Goals: get surgery prior to d/c  Family Goals: valentina    Progress made toward(s) clinical / shift goals:  pt mobilized around the unit. He is tolerating a diet and participating in care including use of IS and oral care. He was evaluated by CT surgery and is on schedule for CABG tomorrow AM. He will transfer later tonight to ICU for pre op care.     Patient is not progressing towards the following goals:    N/A

## 2023-12-07 NOTE — PROGRESS NOTES
[facility] Internal Medicine Daily Progress Note    Date of Service  12/6/2023    [facility] Team: R IM Purple Team   Attending: Roberta Smith M.d.  Senior Resident: Dr. Contreras  Intern:  Dr. Cosby  Contact Number: [phone]    Chief Complaint  Edwin Hammond is a 67 y.o. male admitted 12/5/2023 for NSTEMI.    Hospital Course  Edwin Hammond is a 67 y.o. male with past medical history of obesity, sleep apnea not on CPAP, hypertension, asthma who presented as a direct admit from [facility] with concern for chest pain on exertion, pressure-like, in the middle of the chest, alleviated by rest, lasting 2 to 3 minutes, in the last 4 to 5 days.     He went to an outside clinic, and was sent to [facility] ER by ambulance, receiving nitroglycerin and aspirin. Since that nitroglycerin dose, his chest pain stopped and did not reoccur. Troponin T 135 at OSH, given IV heparin bolus and transferred to [facility] for further workup.    Coronary Angiography performed 12/6/23, noted severe multivessel epicardial CAD.     Interval Problem Update  - This morning patient continues to be asymptomatic, and denies CP or SOB.    - Seen by cardiology, and cardiac catherization performed later this afternoon. Severe multivessel epicardial CAD noted, suggested CT surgical consultation for CABG consideration.    I have discussed this patient's plan of care and discharge plan at IDT rounds today with Case Management, Nursing, Nursing leadership, and other members of the IDT team.    Consultants/Specialty  cardiology    Code Status  Full Code    Disposition  The patient is not medically cleared for discharge to home or a post-acute facility.      I have placed the appropriate orders for post-discharge needs.    Review of Systems  Review of Systems   Constitutional:  Positive for diaphoresis. Negative for chills and fever.   HENT:  Negative for sore throat.    Eyes:  Negative for blurred vision and double vision.   Respiratory:  Negative  for cough, shortness of breath and wheezing.    Cardiovascular:  Negative for chest pain, palpitations and leg swelling.   Gastrointestinal:  Negative for abdominal pain, blood in stool, constipation, diarrhea, nausea and vomiting.   Genitourinary:  Negative for dysuria and hematuria.   Musculoskeletal:  Negative for joint pain and myalgias.   Neurological:  Negative for dizziness, seizures, loss of consciousness and weakness.        Physical Exam  Temp:  [36 °C (96.8 °F)-37 °C (98.6 °F)] 36.2 °C (97.2 °F)  Pulse:  [61-91] 71  Resp:  [16-20] 16  BP: (115-164)/(58-93) 126/86  SpO2:  [91 %-96 %] 96 %    Physical Exam  Constitutional:       General: He is not in acute distress.     Appearance: He is not ill-appearing or diaphoretic.   HENT:      Right Ear: There is no impacted cerumen.      Left Ear: There is no impacted cerumen.      Nose: No rhinorrhea.      Mouth/Throat:      Mouth: Mucous membranes are moist.   Eyes:      General: No scleral icterus.        Right eye: No discharge.         Left eye: No discharge.   Cardiovascular:      Rate and Rhythm: Normal rate and regular rhythm.      Heart sounds: No murmur heard.     No gallop.   Pulmonary:      Effort: Pulmonary effort is normal. No respiratory distress.      Breath sounds: No wheezing or rales.   Abdominal:      General: Abdomen is flat. There is no distension.      Tenderness: There is no abdominal tenderness. There is no guarding or rebound.   Musculoskeletal:      Right lower leg: No edema.      Left lower leg: No edema.   Skin:     Capillary Refill: Capillary refill takes less than 2 seconds.   Neurological:      General: No focal deficit present.      Mental Status: He is alert. Mental status is at baseline.         Fluids    Intake/Output Summary (Last 24 hours) at 12/6/2023 1923  Last data filed at 12/6/2023 1800  Gross per 24 hour   Intake 940 ml   Output 1100 ml   Net -160 ml       Laboratory  Recent Labs     12/05/23  2333   WBC 8.5   RBC 5.63    HEMOGLOBIN 17.2   HEMATOCRIT 51.7   MCV 91.8   MCH 30.6   MCHC 33.3   RDW 44.9   PLATELETCT 289   MPV 9.0     Recent Labs     12/05/23  2333   SODIUM 136   POTASSIUM 4.0   CHLORIDE 103   CO2 21   GLUCOSE 145*   BUN 15   CREATININE 0.94   CALCIUM 9.5     Recent Labs     12/05/23  2333   APTT 28.1   INR 0.95         Recent Labs     12/05/23  2333   TRIGLYCERIDE 159*   HDL 42   *       Imaging  OUTSIDE IMAGES-DX CHEST   Final Result      EC-ECHOCARDIOGRAM COMPLETE W/O CONT    (Results Pending)   CL-LEFT HEART CATHETERIZATION WITH POSSIBLE INTERVENTION    (Results Pending)        Assessment/Plan  Problem Representation:    * NSTEMI (non-ST elevated myocardial infarction) (HCC)- (present on admission)  Assessment & Plan  - 67-year-old male with morbid obesity, uncontrolled hypertension, presented with typical chest pain, elevated troponin without ST/T changes on EKG  - Seen by Cardiology, 12/6/23 cardiac cath performed demonstrating severe multivessel epicardial CAD     - Cardiology consulted, appreciate recommendations  - Consider ordering CT surgical consultation for CABG consideration as per cardiology recs  - Resuming Heparin drip post-procedure  - Aspirin  - Therapeutic heparin drip  - Echo  - Started on carvedilol and losartan    Cough  Assessment & Plan  - Chest x-ray: No acute abnormalities. COVID-19/influenza/RSV screen negative.  - Mentions some sinus congestion, along with occasional prior heartburn. Post nasal drip vs GERD    - Guaifenesin as needed    BMI 38- (present on admission)  Assessment & Plan  BMI 38    Essential hypertension- (present on admission)  Assessment & Plan  - Uncontrolled and reported readings around 180/100 prior to admission, noncompliant with amlodipine    - Started on Carvedilol and Losartan for cardioprotection benefit        Mild intermittent asthma without complication- (present on admission)  Assessment & Plan  Albuterol as needed    DEREK- (present on  "admission)  Assessment & Plan  He stated that he does not use CPAP  Compliance counseling    Dyslipidemia- (present on admission)  Assessment & Plan  See \"NSTEMI\"         VTE prophylaxis: therapeutic anticoagulation with heparin    I have performed a physical exam and reviewed and updated ROS and Plan today (12/6/2023). In review of yesterday's note (12/5/2023), there are no changes except as documented above.          "

## 2023-12-07 NOTE — HOSPITAL COURSE
Edwin Hammond is a 67 y.o. male with past medical history of obesity, sleep apnea not on CPAP, hypertension, asthma who presented as a direct admit from [facility] with concern for chest pain on exertion, pressure-like, in the middle of the chest, alleviated by rest, lasting 2 to 3 minutes, in the last 4 to 5 days.     He went to an outside clinic, and was sent to [facility] ER by ambulance, receiving nitroglycerin and aspirin. Since that nitroglycerin dose, his chest pain stopped and did not reoccur. Troponin T 135 at OSH, given IV heparin bolus and transferred to [facility] for further workup.    Coronary Angiography performed 12/6/23, noted severe multivessel epicardial CAD.

## 2023-12-07 NOTE — DISCHARGE PLANNING
Case Management Discharge Planning    Admission Date: 12/5/2023  GMLOS: 1.8  ALOS: 2    6-Clicks ADL Score: 24  6-Clicks Mobility Score: 24      Anticipated Discharge Dispo: Discharge Disposition: Discharged to home/self care (01)  Discharge Address:  ( [address] [place] [address])  Discharge Contact Phone Number:  (Rama Hammond (p) [phone]; pt's daughter)    DME Needed: No    Action(s) Taken: Updated Provider/Nurse on Discharge Plan    Escalations Completed: None    Medically Clear: No    Next Steps: Per IDT rounds, patient to have a CABG today; pt pending medical clearance.     Barriers to Discharge: Medical clearance    Is the patient up for discharge tomorrow: No

## 2023-12-07 NOTE — CONSULTS
I,  Carolyn Davalos MD performed a substantial portion of the EM visit face-to-face with the same patient on the same date of service with, UDAY Serrano. I was personally involved in reviewing and interpreting the films and conducted elements of the history and physical exam. I performed all of the medical decision making for the patient.        REFERRING PHYSICIAN: Ananda Stratton MD.     CONSULTING PHYSICIAN: Carolyn Davalos MD.    CHIEF COMPLAINT: Chest pain    HISTORY OF PRESENT ILLNESS: The patient is a 67 y.o. male with a past medical history of DEREK, morbid obesity, hypertension, asthma who presented to [facility] on 12/5/23 for substernal chest pain that had been going on for 5 days.  It does not radiate.  It is alleviated by rest and worsened with exertion.  He was transferred to [facility] for elevated troponins.  He was seen by cardiology and underwent a left heart catheterization which shows multivessel disease.  He has had some chest congestion and productive cough over the last week with associated chills.  He denies syncope, lower extremity edema.    He lives with his daughter, PAU, and grandchild. He declines having anyone present or on the phone for the consultation.    PAST MEDICAL HISTORY:   Past Medical History:   Diagnosis Date    Chickenpox     [place] measles     Influenza     Mumps     Sleep apnea        PAST SURGICAL HISTORY:   Past Surgical History:   Procedure Laterality Date    OTHER  [id number]    Mastoidectomy left ear       FAMILY HISTORY:   Family History   Problem Relation Age of Onset    Sleep Apnea Brother         SOCIAL HISTORY:   Social History     Socioeconomic History    Marital status:      Spouse name: Not on file    Number of children: Not on file    Years of education: Not on file    Highest education level: Not on file   Occupational History    Not on file   Tobacco Use    Smoking status: Former     Current packs/day: 0.00     Average packs/day: 1 pack/day  for 30.0 years (30.0 ttl pk-yrs)     Types: Cigarettes     Start date: 1971     Quit date: 2001     Years since quittin.9    Smokeless tobacco: Never   Vaping Use    Vaping Use: Never used   Substance and Sexual Activity    Alcohol use: No    Drug use: No    Sexual activity: Not on file   Other Topics Concern    Not on file   Social History Narrative    Not on file     Social Determinants of Health     Financial Resource Strain: Not on file   Food Insecurity: Not on file   Transportation Needs: Not on file   Physical Activity: Not on file   Stress: Not on file   Social Connections: Not on file   Intimate Partner Violence: Not on file   Housing Stability: Not on file       ALLERGIES:   No Known Allergies     CURRENT MEDICATIONS:     Current Facility-Administered Medications:     carvedilol (Coreg) tablet 6.25 mg, 6.25 mg, Oral, BID WITH MEALS, Terell Gray M.D., 6.25 mg at 23 0854    losartan (Cozaar) tablet 25 mg, 25 mg, Oral, Q DAY, Terell Gray M.D., 25 mg at 23 0504    senna-docusate (Pericolace Or Senokot S) 8.6-50 MG per tablet 2 Tablet, 2 Tablet, Oral, BID, 2 Tablet at 23 0505 **AND** polyethylene glycol/lytes (Miralax) Packet 1 Packet, 1 Packet, Oral, QDAY PRN **AND** magnesium hydroxide (Milk Of Magnesia) suspension 30 mL, 30 mL, Oral, QDAY PRN **AND** bisacodyl (Dulcolax) suppository 10 mg, 10 mg, Rectal, QDAY PRN, Jn Alonzo M.D.    acetaminophen (Tylenol) tablet 650 mg, 650 mg, Oral, Q6HRS PRN, Jn Alonzo M.D.    Notify provider if pain remains uncontrolled, , , CONTINUOUS **AND** Use the Numeric Rating Scale (NRS), Sánchez-Baker Faces (WBF), or FLACC on regular floors and Critical-Care Pain Observation Tool (CPOT) on ICUs/Trauma to assess pain, , , CONTINUOUS **AND** Pulse Ox, , , CONTINUOUS **AND** Pharmacy Consult Request ...Pain Management Review 1 Each, 1 Each, Other, PHARMACY TO DOSE **AND** If patient difficult to arouse and/or has respiratory  depression (respiratory rate of 10 or less), stop any opiates that are currently infusing and call a Rapid Response., , , CONTINUOUS, Jn Alonzo M.D.    oxyCODONE immediate-release (Roxicodone) tablet 5 mg, 5 mg, Oral, Q3HRS PRN **OR** oxyCODONE immediate release (Roxicodone) tablet 10 mg, 10 mg, Oral, Q3HRS PRN **OR** morphine 4 MG/ML injection 4 mg, 4 mg, Intravenous, Q3HRS PRN, Jn Alonzo M.D.    labetalol (Normodyne/Trandate) injection 10 mg, 10 mg, Intravenous, Q4HRS PRN, Jn Alonzo M.D.    ondansetron (Zofran) syringe/vial injection 4 mg, 4 mg, Intravenous, Q4HRS PRN, Jn Alonzo M.D.    ondansetron (Zofran ODT) dispertab 4 mg, 4 mg, Oral, Q4HRS PRN, Jn Alonzo M.D.    aspirin EC tablet 81 mg, 81 mg, Oral, DAILY, Jn Alonzo M.D., 81 mg at 12/07/23 0505    atorvastatin (Lipitor) tablet 80 mg, 80 mg, Oral, Q EVENING, Jn Alonzo M.D., 80 mg at 12/06/23 1701    nitroglycerin (Nitrostat) tablet 0.4 mg, 0.4 mg, Sublingual, Q5 MIN PRN, Jn Alonzo M.D.    ipratropium-albuterol (DUONEB) nebulizer solution, 3 mL, Nebulization, Q4H PRN (RT), Jn Alonzo M.D.    morphine 4 MG/ML injection 2-4 mg, 2-4 mg, Intravenous, Q5 MIN PRN, Jn Alonzo M.D.    citalopram (CeleXA) tablet 30 mg, 30 mg, Oral, DAILY, Jn Alonzo M.D., 30 mg at 12/07/23 0504    fluticasone (Flovent HFA) 110 MCG/ACT inhaler 110 mcg, 1 Puff, Inhalation, BID (RT), Jn Alonzo M.D., 110 mcg at 12/07/23 0854    heparin infusion 25,000 units in 500 mL 0.45% NACL, 0-30 Units/kg/hr (Adjusted), Intravenous, Continuous, Jn Alonzo M.D., Last Rate: 34.6 mL/hr at 12/07/23 0720, 20 Units/kg/hr at 12/07/23 0720    heparin injection 2,000 Units, 2,000 Units, Intravenous, PRN, Jn Alonzo M.D., 2,000 Units at 12/06/23 2350    guaiFENesin dextromethorphan (Robitussin DM) 100-10 MG/5ML syrup 5 mL, 5 mL, Oral, Q6HRS PRN, Jn Alonzo M.D.     LABS REVIEWED:  Lab Results    Component Value Date/Time    SODIUM 134 (L) 12/07/2023 02:02 AM    POTASSIUM 4.1 12/07/2023 02:02 AM    CHLORIDE 102 12/07/2023 02:02 AM    CO2 21 12/07/2023 02:02 AM    GLUCOSE 96 12/07/2023 02:02 AM    BUN 15 12/07/2023 02:02 AM    CREATININE 0.98 12/07/2023 02:02 AM      Lab Results   Component Value Date/Time    PROTHROMBTM 12.8 12/05/2023 11:33 PM    INR 0.95 12/05/2023 11:33 PM      Lab Results   Component Value Date/Time    WBC 8.9 12/07/2023 02:02 AM    RBC 5.81 12/07/2023 02:02 AM    HEMOGLOBIN 17.6 12/07/2023 02:02 AM    HEMATOCRIT 53.1 (H) 12/07/2023 02:02 AM    MCV 91.4 12/07/2023 02:02 AM    MCH 30.3 12/07/2023 02:02 AM    MCHC 33.1 12/07/2023 02:02 AM    MPV 9.1 12/07/2023 02:02 AM    NEUTSPOLYS 56.50 12/05/2023 11:33 PM    LYMPHOCYTES 31.80 12/05/2023 11:33 PM    MONOCYTES 9.00 12/05/2023 11:33 PM    EOSINOPHILS 1.80 12/05/2023 11:33 PM    BASOPHILS 0.70 12/05/2023 11:33 PM        IMAGING REVIEWED AND INTERPRETED:    ECHOCARDIOGRAM: Pending    ANGIOGRAM: 12/6/23 C: I agree with interpretation except that all targets are not large but extremely small; all target vessels are 1.5mm or less when compared to size of catheter. The OM2 and PDA are likely even smaller.    HEMODYNAMICS:  Aortic pressure: 119 /83 mmHg  LVEDP: 25 mmHg  No significant aortic gradient on pullback     Left ventriculography:  Normal estimated LV systolic function, estimated LVEF 65%     CORONARY ANGIOGRAPHY:  The left main coronary artery : Large in caliber vessel with mild CAD, bifurcates to LAD and left circumflex  The left anterior descending coronary artery : Large in caliber transapical vessel with severe 95% mid stenosis.  Gives rise to several diagonal branches  The left circumflex coronary artery : Large in caliber vessel that gives rise to small OM1 and large OM 2.  There is severe 95% stenoses throughout the proximal OM 2.  The right coronary artery  : Large-caliber dominant vessel with severe 70% mid stenosis, and a  long segment of severe 80-90% distal stenosis, with EARLE I flow into the R-PDA with occluded PLB system, fills via left-right collaterals.     IMPRESSION:  1.  Severe multivessel epicardial CAD  2.  Normal estimated LVEF 65%  3.  Elevated resting LVEDP 25mmHg with no significant transaortic gradient on pullback        RECOMMENDATIONS:  Appreciate CT surgical consultation for CABG consideration.  Patient chest pain-free  Resume heparin drip ACS protocol and ongoing optimal secondary ASCVD preventive measures  TR band protocol    REVIEW OF SYSTEMS:   Review of Systems   Constitutional:  Positive for chills and malaise/fatigue. Negative for weight loss.   HENT: Negative.     Eyes: Negative.    Respiratory:  Positive for cough and sputum production.    Cardiovascular:  Positive for chest pain.   Gastrointestinal: Negative.    Genitourinary: Negative.    Musculoskeletal: Negative.    Skin: Negative.    Neurological: Negative.    Endo/Heme/Allergies: Negative.    Psychiatric/Behavioral: Negative.           PHYSICAL EXAMINATION:   Physical Exam  Vitals reviewed.   Constitutional:       General: He is not in acute distress.     Appearance: Normal appearance. He is obese.   HENT:      Head: Normocephalic and atraumatic.      Right Ear: External ear normal.      Left Ear: External ear normal.      Nose: Nose normal.      Mouth/Throat:      Mouth: Mucous membranes are moist.   Eyes:      Extraocular Movements: Extraocular movements intact.      Conjunctiva/sclera: Conjunctivae normal.      Pupils: Pupils are equal, round, and reactive to light.   Cardiovascular:      Rate and Rhythm: Normal rate and regular rhythm.      Pulses: Normal pulses.   Pulmonary:      Effort: Pulmonary effort is normal.   Abdominal:      General: Abdomen is flat. There is no distension.      Palpations: Abdomen is soft.   Musculoskeletal:         General: Normal range of motion.      Cervical back: Normal range of motion.   Skin:     General: Skin is  "warm and dry.      Capillary Refill: Capillary refill takes less than 2 seconds.   Neurological:      General: No focal deficit present.      Mental Status: He is alert and oriented to person, place, and time.   Psychiatric:         Mood and Affect: Mood normal.         Behavior: Behavior normal.       /72   Pulse 68   Temp 36.8 °C (98.2 °F) (Temporal)   Resp 17   Ht 1.727 m (5' 7.99\")   Wt 111 kg (245 lb 9.5 oz)   SpO2 94%   BMI 37.35 kg/m²        IMPRESSION:  68 yo gentleman with known conditions of DEREK, morbid obesity, hypertension, asthma now with NSTEMI, multivessel CAD with VERY SMALL targets and what appears to be preserved LVEF on angio but no TTE completed yet.      PLAN:  I recommend CABG x1-3 this admission depending on whether vessels are graftable.     The procedure, its risks, benefits, potential complications and alternative treatments were discussed with the patient in detail including the risks should he decide not to undergo my recommended treatment. All of his questions were answered to his satisfaction and he is willing to proceed with the operation. The risks include death, stroke,  infection: to include a rare bacterial infection related to the use of the heart/lung machine, pillo-operative myocardial infarction, dysrhythmias, diaphragmatic paralysis, chest wall paresthesia, tracheostomy, kidney or other organ failure, possible return to the operating room for bleeding, bleeding requiring transfusion with its attendant risks including AIDS or hepatitis, dehiscence of surgical incisions, respiratory complications including the need for prolonged ventilator support, Protamine or other drug reaction, peripheral neuropathy, loss of limb, and miscount of surgical items. The operative mortality risk is approximately 1%. The STS mortality risk score is 1% and the morbidity and mortality risk score is 10%. The scores were discussed with patient.    The operation,CABG x1-3 is scheduled for " Friday 12/8/23 at 7:30 AM at [facility].    Findings and recommendations have been discussed with the patient’s cardiologist Ananda Stratton.  Thank you for this very challenging consultation and participation in the patient’s care.  I will keep you apprised of all future developments.          Sincerely,       Carolyn Davalos MD.

## 2023-12-08 ENCOUNTER — APPOINTMENT (OUTPATIENT)
Dept: CARDIOLOGY | Facility: MEDICAL CENTER | Age: 68
DRG: 232 | End: 2023-12-08
Attending: THORACIC SURGERY (CARDIOTHORACIC VASCULAR SURGERY)
Payer: MEDICARE

## 2023-12-08 ENCOUNTER — ANESTHESIA (OUTPATIENT)
Dept: SURGERY | Facility: MEDICAL CENTER | Age: 68
DRG: 232 | End: 2023-12-08
Payer: MEDICARE

## 2023-12-08 ENCOUNTER — APPOINTMENT (OUTPATIENT)
Dept: RADIOLOGY | Facility: MEDICAL CENTER | Age: 68
DRG: 232 | End: 2023-12-08
Attending: THORACIC SURGERY (CARDIOTHORACIC VASCULAR SURGERY)
Payer: MEDICARE

## 2023-12-08 PROBLEM — Z99.11 ON MECHANICALLY ASSISTED VENTILATION (HCC): Status: ACTIVE | Noted: 2023-12-08

## 2023-12-08 PROBLEM — Z95.1 S/P CABG X 3: Status: ACTIVE | Noted: 2023-12-08

## 2023-12-08 PROBLEM — F12.90 MARIJUANA SMOKER: Status: ACTIVE | Noted: 2023-12-08

## 2023-12-08 LAB
ACT BLD: 131 SEC (ref 74–137)
ACT BLD: 136 SEC (ref 74–137)
ACT BLD: 450 SEC (ref 74–137)
ACT BLD: 498 SEC (ref 74–137)
ACT BLD: 509 SEC (ref 74–137)
ACT BLD: 542 SEC (ref 74–137)
ANION GAP SERPL CALC-SCNC: 8 MMOL/L (ref 7–16)
APTT PPP: 28.8 SEC (ref 24.7–36)
BASE EXCESS BLDA CALC-SCNC: -1 MMOL/L (ref -4–3)
BASE EXCESS BLDA CALC-SCNC: -2 MMOL/L (ref -4–3)
BASE EXCESS BLDA CALC-SCNC: -2 MMOL/L (ref -4–3)
BASE EXCESS BLDA CALC-SCNC: -3 MMOL/L (ref -4–3)
BASE EXCESS BLDA CALC-SCNC: -4 MMOL/L (ref -4–3)
BASE EXCESS BLDA CALC-SCNC: -4 MMOL/L (ref -4–3)
BASE EXCESS BLDA CALC-SCNC: -5 MMOL/L (ref -4–3)
BASE EXCESS BLDA CALC-SCNC: -6 MMOL/L (ref -4–3)
BASE EXCESS BLDA CALC-SCNC: 0 MMOL/L (ref -4–3)
BODY TEMPERATURE: ABNORMAL DEGREES
BUN SERPL-MCNC: 16 MG/DL (ref 8–22)
CA-I BLD ISE-SCNC: 0.95 MMOL/L (ref 1.1–1.3)
CA-I BLD ISE-SCNC: 0.99 MMOL/L (ref 1.1–1.3)
CA-I BLD ISE-SCNC: 1.02 MMOL/L (ref 1.1–1.3)
CA-I BLD ISE-SCNC: 1.2 MMOL/L (ref 1.1–1.3)
CA-I BLD ISE-SCNC: 1.2 MMOL/L (ref 1.1–1.3)
CA-I BLD ISE-SCNC: 1.21 MMOL/L (ref 1.1–1.3)
CA-I BLD ISE-SCNC: 1.23 MMOL/L (ref 1.1–1.3)
CALCIUM SERPL-MCNC: 9 MG/DL (ref 8.5–10.5)
CHLORIDE SERPL-SCNC: 105 MMOL/L (ref 96–112)
CO2 BLDA-SCNC: 22 MMOL/L (ref 20–33)
CO2 BLDA-SCNC: 23 MMOL/L (ref 20–33)
CO2 BLDA-SCNC: 24 MMOL/L (ref 20–33)
CO2 BLDA-SCNC: 24 MMOL/L (ref 20–33)
CO2 BLDA-SCNC: 25 MMOL/L (ref 20–33)
CO2 BLDA-SCNC: 26 MMOL/L (ref 20–33)
CO2 BLDA-SCNC: 26 MMOL/L (ref 20–33)
CO2 SERPL-SCNC: 23 MMOL/L (ref 20–33)
CREAT SERPL-MCNC: 1.09 MG/DL (ref 0.5–1.4)
DELSYS IDSYS: ABNORMAL
DELSYS IDSYS: ABNORMAL
EKG IMPRESSION: NORMAL
GFR SERPLBLD CREATININE-BSD FMLA CKD-EPI: 74 ML/MIN/1.73 M 2
GLUCOSE BLD STRIP.AUTO-MCNC: 106 MG/DL (ref 65–99)
GLUCOSE BLD STRIP.AUTO-MCNC: 109 MG/DL (ref 65–99)
GLUCOSE BLD STRIP.AUTO-MCNC: 109 MG/DL (ref 65–99)
GLUCOSE BLD STRIP.AUTO-MCNC: 116 MG/DL (ref 65–99)
GLUCOSE BLD STRIP.AUTO-MCNC: 116 MG/DL (ref 65–99)
GLUCOSE BLD STRIP.AUTO-MCNC: 118 MG/DL (ref 65–99)
GLUCOSE BLD STRIP.AUTO-MCNC: 125 MG/DL (ref 65–99)
GLUCOSE BLD STRIP.AUTO-MCNC: 128 MG/DL (ref 65–99)
GLUCOSE BLD STRIP.AUTO-MCNC: 135 MG/DL (ref 65–99)
GLUCOSE BLD STRIP.AUTO-MCNC: 137 MG/DL (ref 65–99)
GLUCOSE BLD STRIP.AUTO-MCNC: 138 MG/DL (ref 65–99)
GLUCOSE SERPL-MCNC: 106 MG/DL (ref 65–99)
HCO3 BLDA-SCNC: 21.1 MMOL/L (ref 17–25)
HCO3 BLDA-SCNC: 21.2 MMOL/L (ref 17–25)
HCO3 BLDA-SCNC: 21.6 MMOL/L (ref 17–25)
HCO3 BLDA-SCNC: 22 MMOL/L (ref 17–25)
HCO3 BLDA-SCNC: 22.1 MMOL/L (ref 17–25)
HCO3 BLDA-SCNC: 22.7 MMOL/L (ref 17–25)
HCO3 BLDA-SCNC: 23.5 MMOL/L (ref 17–25)
HCO3 BLDA-SCNC: 23.7 MMOL/L (ref 17–25)
HCO3 BLDA-SCNC: 23.8 MMOL/L (ref 17–25)
HCO3 BLDA-SCNC: 24 MMOL/L (ref 17–25)
HCO3 BLDA-SCNC: 24.7 MMOL/L (ref 17–25)
HCT VFR BLD AUTO: 45 % (ref 42–52)
HCT VFR BLD CALC: 38 % (ref 42–52)
HCT VFR BLD CALC: 40 % (ref 42–52)
HCT VFR BLD CALC: 40 % (ref 42–52)
HCT VFR BLD CALC: 42 % (ref 42–52)
HCT VFR BLD CALC: 42 % (ref 42–52)
HCT VFR BLD CALC: 52 % (ref 42–52)
HCT VFR BLD CALC: 52 % (ref 42–52)
HCT VFR BLD CALC: 55 % (ref 42–52)
HGB BLD-MCNC: 12.9 G/DL (ref 14–18)
HGB BLD-MCNC: 13.6 G/DL (ref 14–18)
HGB BLD-MCNC: 13.6 G/DL (ref 14–18)
HGB BLD-MCNC: 14.3 G/DL (ref 14–18)
HGB BLD-MCNC: 14.3 G/DL (ref 14–18)
HGB BLD-MCNC: 14.7 G/DL (ref 14–18)
HGB BLD-MCNC: 17.7 G/DL (ref 14–18)
HGB BLD-MCNC: 17.7 G/DL (ref 14–18)
HGB BLD-MCNC: 18.7 G/DL (ref 14–18)
HOROWITZ INDEX BLDA+IHG-RTO: 86 MM[HG]
HOROWITZ INDEX BLDA+IHG-RTO: 89 MM[HG]
INR PPP: 1.24 (ref 0.87–1.13)
MAGNESIUM SERPL-MCNC: 2.8 MG/DL (ref 1.5–2.5)
MODE IMODE: ABNORMAL
MODE IMODE: ABNORMAL
O2/TOTAL GAS SETTING VFR VENT: 100 %
O2/TOTAL GAS SETTING VFR VENT: 100 %
PCO2 BLDA: 37.5 MMHG (ref 26–37)
PCO2 BLDA: 39.8 MMHG (ref 26–37)
PCO2 BLDA: 40.1 MMHG (ref 26–37)
PCO2 BLDA: 41.6 MMHG (ref 26–37)
PCO2 BLDA: 42.2 MMHG (ref 26–37)
PCO2 BLDA: 42.2 MMHG (ref 26–37)
PCO2 BLDA: 43.5 MMHG (ref 26–37)
PCO2 BLDA: 45.9 MMHG (ref 26–37)
PCO2 BLDA: 46.3 MMHG (ref 26–37)
PCO2 BLDA: 52.4 MMHG (ref 26–37)
PCO2 BLDA: 64.2 MMHG (ref 26–37)
PCO2 TEMP ADJ BLDA: 35.8 MMHG (ref 26–37)
PCO2 TEMP ADJ BLDA: 36.4 MMHG (ref 26–37)
PCO2 TEMP ADJ BLDA: 38.7 MMHG (ref 26–37)
PCO2 TEMP ADJ BLDA: 39.1 MMHG (ref 26–37)
PCO2 TEMP ADJ BLDA: 40.6 MMHG (ref 26–37)
PCO2 TEMP ADJ BLDA: 41.1 MMHG (ref 26–37)
PCO2 TEMP ADJ BLDA: 42.6 MMHG (ref 26–37)
PCO2 TEMP ADJ BLDA: 44.7 MMHG (ref 26–37)
PCO2 TEMP ADJ BLDA: 44.7 MMHG (ref 26–37)
PCO2 TEMP ADJ BLDA: 50.6 MMHG (ref 26–37)
PCO2 TEMP ADJ BLDA: 61.5 MMHG (ref 26–37)
PEEP END EXPIRATORY PRESSURE IPEEP: 8 CMH20
PEEP END EXPIRATORY PRESSURE IPEEP: 8 CMH20
PERCENT MINUTE VOLUME IPMV: 130
PERCENT MINUTE VOLUME IPMV: 160
PH BLDA: 7.18 [PH] (ref 7.4–7.5)
PH BLDA: 7.23 [PH] (ref 7.4–7.5)
PH BLDA: 7.27 [PH] (ref 7.4–7.5)
PH BLDA: 7.31 [PH] (ref 7.4–7.5)
PH BLDA: 7.32 [PH] (ref 7.4–7.5)
PH BLDA: 7.32 [PH] (ref 7.4–7.5)
PH BLDA: 7.33 [PH] (ref 7.4–7.5)
PH BLDA: 7.34 [PH] (ref 7.4–7.5)
PH BLDA: 7.38 [PH] (ref 7.4–7.5)
PH BLDA: 7.39 [PH] (ref 7.4–7.5)
PH BLDA: 7.4 [PH] (ref 7.4–7.5)
PH TEMP ADJ BLDA: 7.19 [PH] (ref 7.4–7.5)
PH TEMP ADJ BLDA: 7.24 [PH] (ref 7.4–7.5)
PH TEMP ADJ BLDA: 7.28 [PH] (ref 7.4–7.5)
PH TEMP ADJ BLDA: 7.32 [PH] (ref 7.4–7.5)
PH TEMP ADJ BLDA: 7.33 [PH] (ref 7.4–7.5)
PH TEMP ADJ BLDA: 7.33 [PH] (ref 7.4–7.5)
PH TEMP ADJ BLDA: 7.35 [PH] (ref 7.4–7.5)
PH TEMP ADJ BLDA: 7.35 [PH] (ref 7.4–7.5)
PH TEMP ADJ BLDA: 7.41 [PH] (ref 7.4–7.5)
PH TEMP ADJ BLDA: 7.41 [PH] (ref 7.4–7.5)
PH TEMP ADJ BLDA: 7.42 [PH] (ref 7.4–7.5)
PLATELET # BLD AUTO: 220 K/UL (ref 164–446)
PO2 BLDA: 132 MMHG (ref 64–87)
PO2 BLDA: 158 MMHG (ref 64–87)
PO2 BLDA: 276 MMHG (ref 64–87)
PO2 BLDA: 310 MMHG (ref 64–87)
PO2 BLDA: 329 MMHG (ref 64–87)
PO2 BLDA: 68 MMHG (ref 64–87)
PO2 BLDA: 75 MMHG (ref 64–87)
PO2 BLDA: 86 MMHG (ref 64–87)
PO2 BLDA: 89 MMHG (ref 64–87)
PO2 BLDA: 96 MMHG (ref 64–87)
PO2 BLDA: >500 MMHG (ref 64–87)
PO2 TEMP ADJ BLDA: 129 MMHG (ref 64–87)
PO2 TEMP ADJ BLDA: 152 MMHG (ref 64–87)
PO2 TEMP ADJ BLDA: 271 MMHG (ref 64–87)
PO2 TEMP ADJ BLDA: 299 MMHG (ref 64–87)
PO2 TEMP ADJ BLDA: 326 MMHG (ref 64–87)
PO2 TEMP ADJ BLDA: 495 MMHG (ref 64–87)
PO2 TEMP ADJ BLDA: 64 MMHG (ref 64–87)
PO2 TEMP ADJ BLDA: 70 MMHG (ref 64–87)
PO2 TEMP ADJ BLDA: 83 MMHG (ref 64–87)
PO2 TEMP ADJ BLDA: 86 MMHG (ref 64–87)
PO2 TEMP ADJ BLDA: 92 MMHG (ref 64–87)
POTASSIUM BLD-SCNC: 4.2 MMOL/L (ref 3.6–5.5)
POTASSIUM BLD-SCNC: 4.4 MMOL/L (ref 3.6–5.5)
POTASSIUM BLD-SCNC: 4.4 MMOL/L (ref 3.6–5.5)
POTASSIUM BLD-SCNC: 4.8 MMOL/L (ref 3.6–5.5)
POTASSIUM BLD-SCNC: 5.2 MMOL/L (ref 3.6–5.5)
POTASSIUM BLD-SCNC: 6 MMOL/L (ref 3.6–5.5)
POTASSIUM BLD-SCNC: 6.3 MMOL/L (ref 3.6–5.5)
POTASSIUM BLD-SCNC: 6.5 MMOL/L (ref 3.6–5.5)
POTASSIUM BLD-SCNC: 6.6 MMOL/L (ref 3.6–5.5)
POTASSIUM SERPL-SCNC: 4.2 MMOL/L (ref 3.6–5.5)
POTASSIUM SERPL-SCNC: 4.4 MMOL/L (ref 3.6–5.5)
POTASSIUM SERPL-SCNC: 4.5 MMOL/L (ref 3.6–5.5)
POTASSIUM SERPL-SCNC: 4.6 MMOL/L (ref 3.6–5.5)
PROTHROMBIN TIME: 15.8 SEC (ref 12–14.6)
SAO2 % BLDA: 100 % (ref 93–99)
SAO2 % BLDA: 90 % (ref 93–99)
SAO2 % BLDA: 93 % (ref 93–99)
SAO2 % BLDA: 96 % (ref 93–99)
SAO2 % BLDA: 99 % (ref 93–99)
SAO2 % BLDA: 99 % (ref 93–99)
SODIUM BLD-SCNC: 129 MMOL/L (ref 135–145)
SODIUM BLD-SCNC: 131 MMOL/L (ref 135–145)
SODIUM BLD-SCNC: 132 MMOL/L (ref 135–145)
SODIUM BLD-SCNC: 133 MMOL/L (ref 135–145)
SODIUM BLD-SCNC: 135 MMOL/L (ref 135–145)
SODIUM BLD-SCNC: 135 MMOL/L (ref 135–145)
SODIUM BLD-SCNC: 136 MMOL/L (ref 135–145)
SODIUM BLD-SCNC: 136 MMOL/L (ref 135–145)
SODIUM BLD-SCNC: 137 MMOL/L (ref 135–145)
SODIUM SERPL-SCNC: 136 MMOL/L (ref 135–145)
SPECIMEN DRAWN FROM PATIENT: ABNORMAL

## 2023-12-08 PROCEDURE — 160048 HCHG OR STATISTICAL LEVEL 1-5: Performed by: THORACIC SURGERY (CARDIOTHORACIC VASCULAR SURGERY)

## 2023-12-08 PROCEDURE — 700101 HCHG RX REV CODE 250: Performed by: NURSE PRACTITIONER

## 2023-12-08 PROCEDURE — 700102 HCHG RX REV CODE 250 W/ 637 OVERRIDE(OP): Performed by: NURSE PRACTITIONER

## 2023-12-08 PROCEDURE — A9270 NON-COVERED ITEM OR SERVICE: HCPCS | Performed by: NURSE PRACTITIONER

## 2023-12-08 PROCEDURE — 33508 ENDOSCOPIC VEIN HARVEST: CPT | Mod: AS,59 | Performed by: CLINICAL NURSE SPECIALIST

## 2023-12-08 PROCEDURE — 160042 HCHG SURGERY MINUTES - EA ADDL 1 MIN LEVEL 5: Performed by: THORACIC SURGERY (CARDIOTHORACIC VASCULAR SURGERY)

## 2023-12-08 PROCEDURE — 700105 HCHG RX REV CODE 258: Performed by: NURSE PRACTITIONER

## 2023-12-08 PROCEDURE — A9270 NON-COVERED ITEM OR SERVICE: HCPCS | Performed by: INTERNAL MEDICINE

## 2023-12-08 PROCEDURE — C9248 INJ, CLEVIDIPINE BUTYRATE: HCPCS | Mod: JG | Performed by: ANESTHESIOLOGY

## 2023-12-08 PROCEDURE — 85018 HEMOGLOBIN: CPT

## 2023-12-08 PROCEDURE — 700105 HCHG RX REV CODE 258

## 2023-12-08 PROCEDURE — 700111 HCHG RX REV CODE 636 W/ 250 OVERRIDE (IP): Mod: JZ,JG | Performed by: NURSE PRACTITIONER

## 2023-12-08 PROCEDURE — 700102 HCHG RX REV CODE 250 W/ 637 OVERRIDE(OP): Performed by: THORACIC SURGERY (CARDIOTHORACIC VASCULAR SURGERY)

## 2023-12-08 PROCEDURE — 94002 VENT MGMT INPAT INIT DAY: CPT

## 2023-12-08 PROCEDURE — 503001 HCHG PERFUSION: Performed by: THORACIC SURGERY (CARDIOTHORACIC VASCULAR SURGERY)

## 2023-12-08 PROCEDURE — C1894 INTRO/SHEATH, NON-LASER: HCPCS | Performed by: THORACIC SURGERY (CARDIOTHORACIC VASCULAR SURGERY)

## 2023-12-08 PROCEDURE — 33518 CABG ARTERY-VEIN TWO: CPT | Mod: AS | Performed by: CLINICAL NURSE SPECIALIST

## 2023-12-08 PROCEDURE — 110454 HCHG SHELL REV 250: Performed by: THORACIC SURGERY (CARDIOTHORACIC VASCULAR SURGERY)

## 2023-12-08 PROCEDURE — C1898 LEAD, PMKR, OTHER THAN TRANS: HCPCS | Performed by: THORACIC SURGERY (CARDIOTHORACIC VASCULAR SURGERY)

## 2023-12-08 PROCEDURE — 160009 HCHG ANES TIME/MIN: Performed by: THORACIC SURGERY (CARDIOTHORACIC VASCULAR SURGERY)

## 2023-12-08 PROCEDURE — 94669 MECHANICAL CHEST WALL OSCILL: CPT

## 2023-12-08 PROCEDURE — 82962 GLUCOSE BLOOD TEST: CPT | Mod: 91

## 2023-12-08 PROCEDURE — 700102 HCHG RX REV CODE 250 W/ 637 OVERRIDE(OP): Performed by: INTERNAL MEDICINE

## 2023-12-08 PROCEDURE — 770022 HCHG ROOM/CARE - ICU (200)

## 2023-12-08 PROCEDURE — P9047 ALBUMIN (HUMAN), 25%, 50ML: HCPCS | Mod: JG

## 2023-12-08 PROCEDURE — 700111 HCHG RX REV CODE 636 W/ 250 OVERRIDE (IP)

## 2023-12-08 PROCEDURE — 110371 HCHG SHELL REV 272: Performed by: THORACIC SURGERY (CARDIOTHORACIC VASCULAR SURGERY)

## 2023-12-08 PROCEDURE — 93325 DOPPLER ECHO COLOR FLOW MAPG: CPT

## 2023-12-08 PROCEDURE — 700101 HCHG RX REV CODE 250

## 2023-12-08 PROCEDURE — 160031 HCHG SURGERY MINUTES - 1ST 30 MINS LEVEL 5: Performed by: THORACIC SURGERY (CARDIOTHORACIC VASCULAR SURGERY)

## 2023-12-08 PROCEDURE — 700111 HCHG RX REV CODE 636 W/ 250 OVERRIDE (IP): Performed by: NURSE PRACTITIONER

## 2023-12-08 PROCEDURE — 85347 COAGULATION TIME ACTIVATED: CPT | Mod: 91

## 2023-12-08 PROCEDURE — 85014 HEMATOCRIT: CPT | Mod: 91

## 2023-12-08 PROCEDURE — 700105 HCHG RX REV CODE 258: Performed by: ANESTHESIOLOGY

## 2023-12-08 PROCEDURE — 700111 HCHG RX REV CODE 636 W/ 250 OVERRIDE (IP): Performed by: THORACIC SURGERY (CARDIOTHORACIC VASCULAR SURGERY)

## 2023-12-08 PROCEDURE — 700105 HCHG RX REV CODE 258: Performed by: THORACIC SURGERY (CARDIOTHORACIC VASCULAR SURGERY)

## 2023-12-08 PROCEDURE — 99291 CRITICAL CARE FIRST HOUR: CPT | Performed by: STUDENT IN AN ORGANIZED HEALTH CARE EDUCATION/TRAINING PROGRAM

## 2023-12-08 PROCEDURE — 700101 HCHG RX REV CODE 250: Performed by: THORACIC SURGERY (CARDIOTHORACIC VASCULAR SURGERY)

## 2023-12-08 PROCEDURE — 33572 OPEN CORONARY ENDARTERECTOMY: CPT | Mod: AS | Performed by: CLINICAL NURSE SPECIALIST

## 2023-12-08 PROCEDURE — 94799 UNLISTED PULMONARY SVC/PX: CPT

## 2023-12-08 PROCEDURE — 700101 HCHG RX REV CODE 250: Performed by: ANESTHESIOLOGY

## 2023-12-08 PROCEDURE — C9248 INJ, CLEVIDIPINE BUTYRATE: HCPCS | Mod: JZ,JG | Performed by: NURSE PRACTITIONER

## 2023-12-08 PROCEDURE — 93010 ELECTROCARDIOGRAM REPORT: CPT | Performed by: INTERNAL MEDICINE

## 2023-12-08 PROCEDURE — 02100Z9 BYPASS CORONARY ARTERY, ONE ARTERY FROM LEFT INTERNAL MAMMARY, OPEN APPROACH: ICD-10-PCS | Performed by: THORACIC SURGERY (CARDIOTHORACIC VASCULAR SURGERY)

## 2023-12-08 PROCEDURE — 94003 VENT MGMT INPAT SUBQ DAY: CPT

## 2023-12-08 PROCEDURE — C1751 CATH, INF, PER/CENT/MIDLINE: HCPCS | Performed by: THORACIC SURGERY (CARDIOTHORACIC VASCULAR SURGERY)

## 2023-12-08 PROCEDURE — 84295 ASSAY OF SERUM SODIUM: CPT

## 2023-12-08 PROCEDURE — 700111 HCHG RX REV CODE 636 W/ 250 OVERRIDE (IP): Mod: JZ | Performed by: ANESTHESIOLOGY

## 2023-12-08 PROCEDURE — 93005 ELECTROCARDIOGRAM TRACING: CPT | Performed by: NURSE PRACTITIONER

## 2023-12-08 PROCEDURE — 82803 BLOOD GASES ANY COMBINATION: CPT | Mod: 91

## 2023-12-08 PROCEDURE — 71045 X-RAY EXAM CHEST 1 VIEW: CPT

## 2023-12-08 PROCEDURE — 85049 AUTOMATED PLATELET COUNT: CPT

## 2023-12-08 PROCEDURE — 84132 ASSAY OF SERUM POTASSIUM: CPT | Mod: 91

## 2023-12-08 PROCEDURE — 02C03ZZ EXTIRPATION OF MATTER FROM CORONARY ARTERY, ONE ARTERY, PERCUTANEOUS APPROACH: ICD-10-PCS | Performed by: THORACIC SURGERY (CARDIOTHORACIC VASCULAR SURGERY)

## 2023-12-08 PROCEDURE — 06BP0ZZ EXCISION OF RIGHT SAPHENOUS VEIN, OPEN APPROACH: ICD-10-PCS | Performed by: THORACIC SURGERY (CARDIOTHORACIC VASCULAR SURGERY)

## 2023-12-08 PROCEDURE — 85610 PROTHROMBIN TIME: CPT

## 2023-12-08 PROCEDURE — 85730 THROMBOPLASTIN TIME PARTIAL: CPT

## 2023-12-08 PROCEDURE — 021109W BYPASS CORONARY ARTERY, TWO ARTERIES FROM AORTA WITH AUTOLOGOUS VENOUS TISSUE, OPEN APPROACH: ICD-10-PCS | Performed by: THORACIC SURGERY (CARDIOTHORACIC VASCULAR SURGERY)

## 2023-12-08 PROCEDURE — 80048 BASIC METABOLIC PNL TOTAL CA: CPT

## 2023-12-08 PROCEDURE — 82330 ASSAY OF CALCIUM: CPT | Mod: 91

## 2023-12-08 PROCEDURE — 83735 ASSAY OF MAGNESIUM: CPT

## 2023-12-08 PROCEDURE — 94150 VITAL CAPACITY TEST: CPT

## 2023-12-08 PROCEDURE — 33533 CABG ARTERIAL SINGLE: CPT | Mod: AS | Performed by: CLINICAL NURSE SPECIALIST

## 2023-12-08 DEVICE — MARKER GRAFT AC VEIN DISK SHAPED (10EA/BX): Type: IMPLANTABLE DEVICE | Site: HEART | Status: FUNCTIONAL

## 2023-12-08 RX ORDER — SODIUM CHLORIDE, SODIUM GLUCONATE, SODIUM ACETATE, POTASSIUM CHLORIDE AND MAGNESIUM CHLORIDE 526; 502; 368; 37; 30 MG/100ML; MG/100ML; MG/100ML; MG/100ML; MG/100ML
INJECTION, SOLUTION INTRAVENOUS PRN
Status: DISCONTINUED | OUTPATIENT
Start: 2023-12-08 | End: 2023-12-13 | Stop reason: HOSPADM

## 2023-12-08 RX ORDER — DIPHENHYDRAMINE HCL 25 MG
25 TABLET ORAL
Status: DISCONTINUED | OUTPATIENT
Start: 2023-12-08 | End: 2023-12-13 | Stop reason: HOSPADM

## 2023-12-08 RX ORDER — VECURONIUM BROMIDE 1 MG/ML
INJECTION, POWDER, LYOPHILIZED, FOR SOLUTION INTRAVENOUS PRN
Status: DISCONTINUED | OUTPATIENT
Start: 2023-12-08 | End: 2023-12-08 | Stop reason: SURG

## 2023-12-08 RX ORDER — SODIUM CHLORIDE 9 MG/ML
INJECTION, SOLUTION INTRAVENOUS CONTINUOUS
Status: DISCONTINUED | OUTPATIENT
Start: 2023-12-08 | End: 2023-12-10

## 2023-12-08 RX ORDER — ALUMINA, MAGNESIA, AND SIMETHICONE 2400; 2400; 240 MG/30ML; MG/30ML; MG/30ML
30 SUSPENSION ORAL EVERY 4 HOURS PRN
Status: DISCONTINUED | OUTPATIENT
Start: 2023-12-08 | End: 2023-12-13 | Stop reason: HOSPADM

## 2023-12-08 RX ORDER — NOREPINEPHRINE BITARTRATE 0.03 MG/ML
0-1 INJECTION, SOLUTION INTRAVENOUS CONTINUOUS
Status: DISCONTINUED | OUTPATIENT
Start: 2023-12-08 | End: 2023-12-08

## 2023-12-08 RX ORDER — EPINEPHRINE HCL IN 0.9 % NACL 4MG/250ML
0-.5 PLASTIC BAG, INJECTION (ML) INTRAVENOUS CONTINUOUS
Status: DISCONTINUED | OUTPATIENT
Start: 2023-12-08 | End: 2023-12-08

## 2023-12-08 RX ORDER — OXYCODONE HYDROCHLORIDE 5 MG/1
5 TABLET ORAL
Status: DISCONTINUED | OUTPATIENT
Start: 2023-12-08 | End: 2023-12-13 | Stop reason: HOSPADM

## 2023-12-08 RX ORDER — HEPARIN SODIUM,PORCINE 1000/ML
VIAL (ML) INJECTION PRN
Status: DISCONTINUED | OUTPATIENT
Start: 2023-12-08 | End: 2023-12-08 | Stop reason: SURG

## 2023-12-08 RX ORDER — PROCHLORPERAZINE EDISYLATE 5 MG/ML
10 INJECTION INTRAMUSCULAR; INTRAVENOUS EVERY 6 HOURS PRN
Status: DISCONTINUED | OUTPATIENT
Start: 2023-12-08 | End: 2023-12-13 | Stop reason: HOSPADM

## 2023-12-08 RX ORDER — ONDANSETRON 2 MG/ML
8 INJECTION INTRAMUSCULAR; INTRAVENOUS EVERY 6 HOURS PRN
Status: DISCONTINUED | OUTPATIENT
Start: 2023-12-08 | End: 2023-12-13 | Stop reason: HOSPADM

## 2023-12-08 RX ORDER — POTASSIUM CHLORIDE 7.45 MG/ML
10 INJECTION INTRAVENOUS ONCE
Status: COMPLETED | OUTPATIENT
Start: 2023-12-09 | End: 2023-12-09

## 2023-12-08 RX ORDER — MAGNESIUM SULFATE 1 G/100ML
1 INJECTION INTRAVENOUS DAILY
Status: COMPLETED | OUTPATIENT
Start: 2023-12-08 | End: 2023-12-10

## 2023-12-08 RX ORDER — MORPHINE SULFATE 4 MG/ML
4 INJECTION INTRAVENOUS
Status: DISCONTINUED | OUTPATIENT
Start: 2023-12-08 | End: 2023-12-09

## 2023-12-08 RX ORDER — BISACODYL 10 MG
10 SUPPOSITORY, RECTAL RECTAL
Status: DISCONTINUED | OUTPATIENT
Start: 2023-12-08 | End: 2023-12-13 | Stop reason: HOSPADM

## 2023-12-08 RX ORDER — PROTAMINE SULFATE 10 MG/ML
INJECTION, SOLUTION INTRAVENOUS PRN
Status: DISCONTINUED | OUTPATIENT
Start: 2023-12-08 | End: 2023-12-08 | Stop reason: SURG

## 2023-12-08 RX ORDER — ACETAMINOPHEN 500 MG
1000 TABLET ORAL EVERY 6 HOURS PRN
Status: DISCONTINUED | OUTPATIENT
Start: 2023-12-18 | End: 2023-12-13 | Stop reason: HOSPADM

## 2023-12-08 RX ORDER — NITROGLYCERIN 20 MG/100ML
0-100 INJECTION INTRAVENOUS CONTINUOUS
Status: DISCONTINUED | OUTPATIENT
Start: 2023-12-08 | End: 2023-12-09

## 2023-12-08 RX ORDER — OMEPRAZOLE 20 MG/1
20 CAPSULE, DELAYED RELEASE ORAL DAILY
Status: DISCONTINUED | OUTPATIENT
Start: 2023-12-09 | End: 2023-12-13 | Stop reason: HOSPADM

## 2023-12-08 RX ORDER — SODIUM CHLORIDE 9 MG/ML
INJECTION, SOLUTION INTRAVENOUS CONTINUOUS
Status: DISCONTINUED | OUTPATIENT
Start: 2023-12-08 | End: 2023-12-10 | Stop reason: ALTCHOICE

## 2023-12-08 RX ORDER — DEXMEDETOMIDINE HYDROCHLORIDE 4 UG/ML
0-1.5 INJECTION, SOLUTION INTRAVENOUS CONTINUOUS
Status: DISCONTINUED | OUTPATIENT
Start: 2023-12-08 | End: 2023-12-08

## 2023-12-08 RX ORDER — NOREPINEPHRINE BITARTRATE 0.03 MG/ML
0-1 INJECTION, SOLUTION INTRAVENOUS CONTINUOUS
Status: DISCONTINUED | OUTPATIENT
Start: 2023-12-08 | End: 2023-12-09

## 2023-12-08 RX ORDER — OXYCODONE HYDROCHLORIDE 10 MG/1
10 TABLET ORAL
Status: DISCONTINUED | OUTPATIENT
Start: 2023-12-08 | End: 2023-12-13 | Stop reason: HOSPADM

## 2023-12-08 RX ORDER — POLYETHYLENE GLYCOL 3350 17 G/17G
1 POWDER, FOR SOLUTION ORAL DAILY
Status: DISCONTINUED | OUTPATIENT
Start: 2023-12-09 | End: 2023-12-13 | Stop reason: HOSPADM

## 2023-12-08 RX ORDER — SODIUM CHLORIDE, SODIUM LACTATE, POTASSIUM CHLORIDE, CALCIUM CHLORIDE 600; 310; 30; 20 MG/100ML; MG/100ML; MG/100ML; MG/100ML
INJECTION, SOLUTION INTRAVENOUS
Status: DISCONTINUED | OUTPATIENT
Start: 2023-12-08 | End: 2023-12-08 | Stop reason: SURG

## 2023-12-08 RX ORDER — TRAMADOL HYDROCHLORIDE 50 MG/1
50 TABLET ORAL EVERY 4 HOURS PRN
Status: DISCONTINUED | OUTPATIENT
Start: 2023-12-08 | End: 2023-12-13 | Stop reason: HOSPADM

## 2023-12-08 RX ORDER — PAPAVERINE HYDROCHLORIDE 30 MG/ML
INJECTION INTRAMUSCULAR; INTRAVENOUS
Status: DISCONTINUED | OUTPATIENT
Start: 2023-12-08 | End: 2023-12-08 | Stop reason: HOSPADM

## 2023-12-08 RX ORDER — ENOXAPARIN SODIUM 100 MG/ML
40 INJECTION SUBCUTANEOUS DAILY
Status: DISCONTINUED | OUTPATIENT
Start: 2023-12-09 | End: 2023-12-13 | Stop reason: HOSPADM

## 2023-12-08 RX ORDER — AMOXICILLIN 250 MG
2 CAPSULE ORAL 2 TIMES DAILY
Status: DISCONTINUED | OUTPATIENT
Start: 2023-12-08 | End: 2023-12-13 | Stop reason: HOSPADM

## 2023-12-08 RX ORDER — CLOPIDOGREL BISULFATE 75 MG/1
75 TABLET ORAL DAILY
Status: DISCONTINUED | OUTPATIENT
Start: 2023-12-09 | End: 2023-12-13 | Stop reason: HOSPADM

## 2023-12-08 RX ORDER — GLYCOPYRROLATE 0.2 MG/ML
INJECTION INTRAMUSCULAR; INTRAVENOUS PRN
Status: DISCONTINUED | OUTPATIENT
Start: 2023-12-08 | End: 2023-12-08 | Stop reason: SURG

## 2023-12-08 RX ORDER — SODIUM CHLORIDE, SODIUM GLUCONATE, SODIUM ACETATE, POTASSIUM CHLORIDE AND MAGNESIUM CHLORIDE 526; 502; 368; 37; 30 MG/100ML; MG/100ML; MG/100ML; MG/100ML; MG/100ML
INJECTION, SOLUTION INTRAVENOUS
Status: DISCONTINUED | OUTPATIENT
Start: 2023-12-08 | End: 2023-12-08 | Stop reason: SURG

## 2023-12-08 RX ORDER — DEXMEDETOMIDINE HYDROCHLORIDE 4 UG/ML
0-1.5 INJECTION, SOLUTION INTRAVENOUS CONTINUOUS
Status: DISCONTINUED | OUTPATIENT
Start: 2023-12-08 | End: 2023-12-09

## 2023-12-08 RX ORDER — MIDAZOLAM HYDROCHLORIDE 1 MG/ML
INJECTION INTRAMUSCULAR; INTRAVENOUS PRN
Status: DISCONTINUED | OUTPATIENT
Start: 2023-12-08 | End: 2023-12-08 | Stop reason: SURG

## 2023-12-08 RX ORDER — EPHEDRINE SULFATE 50 MG/ML
INJECTION, SOLUTION INTRAVENOUS PRN
Status: DISCONTINUED | OUTPATIENT
Start: 2023-12-08 | End: 2023-12-08 | Stop reason: SURG

## 2023-12-08 RX ORDER — LIDOCAINE HYDROCHLORIDE 20 MG/ML
INJECTION, SOLUTION EPIDURAL; INFILTRATION; INTRACAUDAL; PERINEURAL PRN
Status: DISCONTINUED | OUTPATIENT
Start: 2023-12-08 | End: 2023-12-08 | Stop reason: SURG

## 2023-12-08 RX ORDER — MIDAZOLAM HYDROCHLORIDE 1 MG/ML
2 INJECTION INTRAMUSCULAR; INTRAVENOUS
Status: DISCONTINUED | OUTPATIENT
Start: 2023-12-08 | End: 2023-12-09

## 2023-12-08 RX ORDER — INSULIN LISPRO 100 [IU]/ML
0-14 INJECTION, SOLUTION INTRAVENOUS; SUBCUTANEOUS
Status: ACTIVE | OUTPATIENT
Start: 2023-12-08 | End: 2023-12-09

## 2023-12-08 RX ORDER — METHADONE HYDROCHLORIDE 10 MG/ML
20 INJECTION, SOLUTION INTRAMUSCULAR; INTRAVENOUS; SUBCUTANEOUS ONCE
Status: COMPLETED | OUTPATIENT
Start: 2023-12-08 | End: 2023-12-08

## 2023-12-08 RX ORDER — ASPIRIN 81 MG/1
81 TABLET ORAL DAILY
Status: DISCONTINUED | OUTPATIENT
Start: 2023-12-09 | End: 2023-12-13 | Stop reason: HOSPADM

## 2023-12-08 RX ORDER — ACETAMINOPHEN 500 MG
1000 TABLET ORAL EVERY 6 HOURS
Status: DISCONTINUED | OUTPATIENT
Start: 2023-12-08 | End: 2023-12-13 | Stop reason: HOSPADM

## 2023-12-08 RX ADMIN — SODIUM CHLORIDE: 9 INJECTION, SOLUTION INTRAVENOUS at 17:37

## 2023-12-08 RX ADMIN — LIDOCAINE HYDROCHLORIDE 100 MG: 20 INJECTION, SOLUTION EPIDURAL; INFILTRATION; INTRACAUDAL at 08:07

## 2023-12-08 RX ADMIN — DOCUSATE SODIUM 50 MG AND SENNOSIDES 8.6 MG 2 TABLET: 8.6; 5 TABLET, FILM COATED ORAL at 17:18

## 2023-12-08 RX ADMIN — SODIUM CHLORIDE 4 UNITS/HR: 9 INJECTION, SOLUTION INTRAVENOUS at 13:25

## 2023-12-08 RX ADMIN — CLEVIPIDINE 2 MG/HR: 0.5 EMULSION INTRAVENOUS at 13:09

## 2023-12-08 RX ADMIN — PROPOFOL 100 MG: 10 INJECTION, EMULSION INTRAVENOUS at 08:07

## 2023-12-08 RX ADMIN — MIDAZOLAM HYDROCHLORIDE 2 MG: 1 INJECTION, SOLUTION INTRAMUSCULAR; INTRAVENOUS at 07:59

## 2023-12-08 RX ADMIN — CLEVIPIDINE 200 MCG: 0.5 EMULSION INTRAVENOUS at 09:06

## 2023-12-08 RX ADMIN — MUPIROCIN 0.25 INCH: 20 OINTMENT TOPICAL at 06:02

## 2023-12-08 RX ADMIN — Medication 1 APPLICATOR: at 13:13

## 2023-12-08 RX ADMIN — SODIUM CHLORIDE 2 UNITS/HR: 9 INJECTION, SOLUTION INTRAVENOUS at 11:05

## 2023-12-08 RX ADMIN — FENTANYL CITRATE 50 MCG: 50 INJECTION, SOLUTION INTRAMUSCULAR; INTRAVENOUS at 22:01

## 2023-12-08 RX ADMIN — SODIUM CHLORIDE: 9 INJECTION, SOLUTION INTRAVENOUS at 13:26

## 2023-12-08 RX ADMIN — SODIUM CHLORIDE, SODIUM GLUCONATE, SODIUM ACETATE, POTASSIUM CHLORIDE AND MAGNESIUM CHLORIDE: 526; 502; 368; 37; 30 INJECTION, SOLUTION INTRAVENOUS at 07:55

## 2023-12-08 RX ADMIN — POTASSIUM CHLORIDE 10 MEQ: 7.46 INJECTION, SOLUTION INTRAVENOUS at 23:55

## 2023-12-08 RX ADMIN — VECURONIUM BROMIDE 4 MG: 1 INJECTION, POWDER, LYOPHILIZED, FOR SOLUTION INTRAVENOUS at 08:07

## 2023-12-08 RX ADMIN — Medication 1 APPLICATOR: at 20:32

## 2023-12-08 RX ADMIN — ACETAMINOPHEN 1000 MG: 500 TABLET, FILM COATED ORAL at 23:55

## 2023-12-08 RX ADMIN — PROPOFOL 50 MG: 10 INJECTION, EMULSION INTRAVENOUS at 08:08

## 2023-12-08 RX ADMIN — AMINOCAPROIC ACID 10 G: 250 INJECTION, SOLUTION INTRAVENOUS at 09:34

## 2023-12-08 RX ADMIN — SODIUM CHLORIDE, SODIUM GLUCONATE, SODIUM ACETATE, POTASSIUM CHLORIDE AND MAGNESIUM CHLORIDE: 526; 502; 368; 37; 30 INJECTION, SOLUTION INTRAVENOUS at 11:37

## 2023-12-08 RX ADMIN — ACETAMINOPHEN 1000 MG: 500 TABLET ORAL at 06:01

## 2023-12-08 RX ADMIN — NOREPINEPHRINE BITARTRATE 0.05 MCG/KG/MIN: 1 INJECTION INTRAVENOUS at 11:57

## 2023-12-08 RX ADMIN — OXYCODONE HYDROCHLORIDE 10 MG: 10 TABLET ORAL at 23:56

## 2023-12-08 RX ADMIN — CLEVIPIDINE 200 MCG: 0.5 EMULSION INTRAVENOUS at 09:13

## 2023-12-08 RX ADMIN — TRAMADOL HYDROCHLORIDE 50 MG: 50 TABLET ORAL at 19:18

## 2023-12-08 RX ADMIN — OXYCODONE HYDROCHLORIDE 10 MG: 10 TABLET ORAL at 17:17

## 2023-12-08 RX ADMIN — SODIUM CHLORIDE, SODIUM GLUCONATE, SODIUM ACETATE, POTASSIUM CHLORIDE AND MAGNESIUM CHLORIDE: 526; 502; 368; 37; 30 INJECTION, SOLUTION INTRAVENOUS at 13:00

## 2023-12-08 RX ADMIN — CLEVIPIDINE 100 MCG: 0.5 EMULSION INTRAVENOUS at 12:56

## 2023-12-08 RX ADMIN — VECURONIUM BROMIDE 10 MG: 1 INJECTION, POWDER, LYOPHILIZED, FOR SOLUTION INTRAVENOUS at 10:14

## 2023-12-08 RX ADMIN — EPHEDRINE SULFATE 10 MG: 50 INJECTION, SOLUTION INTRAVENOUS at 09:51

## 2023-12-08 RX ADMIN — SODIUM CHLORIDE: 9 INJECTION, SOLUTION INTRAVENOUS at 13:16

## 2023-12-08 RX ADMIN — VANCOMYCIN HYDROCHLORIDE 1500 MG: 5 INJECTION, POWDER, LYOPHILIZED, FOR SOLUTION INTRAVENOUS at 19:17

## 2023-12-08 RX ADMIN — VANCOMYCIN HYDROCHLORIDE 1500 MG: 1 INJECTION, POWDER, LYOPHILIZED, FOR SOLUTION INTRAVENOUS at 08:28

## 2023-12-08 RX ADMIN — NOREPINEPHRINE BITARTRATE 0.05 MCG/KG/MIN: 1 INJECTION INTRAVENOUS at 16:18

## 2023-12-08 RX ADMIN — SODIUM CHLORIDE, POTASSIUM CHLORIDE, SODIUM LACTATE AND CALCIUM CHLORIDE: 600; 310; 30; 20 INJECTION, SOLUTION INTRAVENOUS at 08:28

## 2023-12-08 RX ADMIN — DEXMEDETOMIDINE HYDROCHLORIDE 0.5 MCG/KG/HR: 100 INJECTION, SOLUTION INTRAVENOUS at 13:10

## 2023-12-08 RX ADMIN — OXYCODONE HYDROCHLORIDE 10 MG: 10 TABLET ORAL at 20:54

## 2023-12-08 RX ADMIN — METHADONE HYDROCHLORIDE 20 MG: 10 INJECTION, SOLUTION INTRAMUSCULAR; INTRAVENOUS; SUBCUTANEOUS at 08:49

## 2023-12-08 RX ADMIN — PROTAMINE SULFATE 400 MG: 10 INJECTION, SOLUTION INTRAVENOUS at 11:50

## 2023-12-08 RX ADMIN — GLYCOPYRROLATE 0.2 MG: 0.2 INJECTION INTRAMUSCULAR; INTRAVENOUS at 11:48

## 2023-12-08 RX ADMIN — HEPARIN SODIUM 20 UNITS/KG/HR: 5000 INJECTION, SOLUTION INTRAVENOUS at 01:43

## 2023-12-08 RX ADMIN — HEPARIN SODIUM 42000 UNITS: 1000 INJECTION, SOLUTION INTRAVENOUS; SUBCUTANEOUS at 09:32

## 2023-12-08 RX ADMIN — GLYCOPYRROLATE 0.4 MG: 0.2 INJECTION INTRAMUSCULAR; INTRAVENOUS at 08:54

## 2023-12-08 RX ADMIN — ATORVASTATIN CALCIUM 80 MG: 80 TABLET, FILM COATED ORAL at 17:18

## 2023-12-08 RX ADMIN — AMINOCAPROIC ACID 2 G/HR: 250 INJECTION, SOLUTION INTRAVENOUS at 10:10

## 2023-12-08 RX ADMIN — MAGNESIUM SULFATE HEPTAHYDRATE 1 G: 1 INJECTION, SOLUTION INTRAVENOUS at 13:20

## 2023-12-08 RX ADMIN — SODIUM CHLORIDE, SODIUM GLUCONATE, SODIUM ACETATE, POTASSIUM CHLORIDE AND MAGNESIUM CHLORIDE: 526; 502; 368; 37; 30 INJECTION, SOLUTION INTRAVENOUS at 15:36

## 2023-12-08 RX ADMIN — EPHEDRINE SULFATE 5 MG: 50 INJECTION, SOLUTION INTRAVENOUS at 10:07

## 2023-12-08 RX ADMIN — ACETAMINOPHEN 1000 MG: 500 TABLET, FILM COATED ORAL at 17:18

## 2023-12-08 RX ADMIN — DEXMEDETOMIDINE HYDROCHLORIDE 2.5 MCG/KG/HR: 100 INJECTION, SOLUTION INTRAVENOUS at 09:34

## 2023-12-08 RX ADMIN — CLEVIPIDINE 200 MCG: 0.5 EMULSION INTRAVENOUS at 09:03

## 2023-12-08 RX ADMIN — VECURONIUM BROMIDE 4 MG: 1 INJECTION, POWDER, LYOPHILIZED, FOR SOLUTION INTRAVENOUS at 09:22

## 2023-12-08 RX ADMIN — VECURONIUM BROMIDE 12 MG: 1 INJECTION, POWDER, LYOPHILIZED, FOR SOLUTION INTRAVENOUS at 08:08

## 2023-12-08 RX ADMIN — FLUTICASONE PROPIONATE 110 MCG: 110 AEROSOL, METERED RESPIRATORY (INHALATION) at 21:58

## 2023-12-08 RX ADMIN — METOPROLOL TARTRATE 12.5 MG: 25 TABLET, FILM COATED ORAL at 06:02

## 2023-12-08 ASSESSMENT — PAIN DESCRIPTION - PAIN TYPE
TYPE: SURGICAL PAIN

## 2023-12-08 ASSESSMENT — COPD QUESTIONNAIRES
DURING THE PAST 4 WEEKS HOW MUCH DID YOU FEEL SHORT OF BREATH: SOME OF THE TIME
COPD SCREENING SCORE: 6
DO YOU EVER COUGH UP ANY MUCUS OR PHLEGM?: YES, A FEW DAYS A WEEK OR MONTH
HAVE YOU SMOKED AT LEAST 100 CIGARETTES IN YOUR ENTIRE LIFE: YES

## 2023-12-08 ASSESSMENT — PULMONARY FUNCTION TESTS: FVC: 1.3

## 2023-12-08 NOTE — ASSESSMENT & PLAN NOTE
LAD with 95% mid stenosis, OM 2 with 95% stenosis, RCA with 70% mid stenosis and long segment of 80 to-90% distal stenosis    Now s/p CABG    Aspirin  Statin

## 2023-12-08 NOTE — ANESTHESIA PREPROCEDURE EVALUATION
Case: [id number] Date/Time: 12/08/23 0745    Procedures:       CABG X 3, WITH ENDOSCOPIC VEIN PROCUREMENT      ECHOCARDIOGRAM, TRANSESOPHAGEAL, INTRAOPERATIVE    Anesthesia type: General    Location: [place] [address] / SURGERY [facility]    Surgeons: Carolyn Davalos M.D.            Relevant Problems   ANESTHESIA   (positive) DEREK      PULMONARY   (positive) Mild intermittent asthma without complication      CARDIAC   (positive) Coronary artery disease   (positive) Essential hypertension   (positive) NSTEMI (non-ST elevated myocardial infarction) (HCC)      Other   (positive) Marijuana smoker       Physical Exam    Airway   Mallampati: II  TM distance: >3 FB  Neck ROM: full       Cardiovascular - normal exam  Rhythm: regular  Rate: normal  (-) murmur     Dental - normal exam           Pulmonary - normal exam  Breath sounds clear to auscultation     Abdominal    Neurological - normal exam                   Anesthesia Plan    ASA 4   ASA physical status 4 criteria: ongoing cardiac ischemia or unstable angina    Plan - general       Airway plan will be ETT  RHIANNA Planned        Induction: intravenous    Postoperative Plan: Postoperative administration of opioids is intended.    Pertinent diagnostic labs and testing reviewed    Informed Consent:    Anesthetic plan and risks discussed with patient.    Use of blood products discussed with: patient whom consented to blood products.            [de-identified] : Patient presents today c/o right nasal obstruction , metallic taste and smell .  He noticed that right nostril is  completely  block . Not able to sleep on that  side .  Never  evaluated for nasal obstruction.  Has  had  nasal trauma  in the past no  Xray performed.  He has been  using netti pot with no improvement . Pt has been on allergy meds, nasal steroids, and rinses with no improvement in symptoms. \par Smell and taste of  metal ,   had  covid  twice  most recent in June.    Taste and  smell recently started .  He denies  any starting any new medication .

## 2023-12-08 NOTE — PROGRESS NOTES
Report received from day shift RN, bedside report completed and eyes on the pt. Lab results and notes have been reviewed. The pt is resting in bed and all needs are met at this time. The bed is locked/lowered and the call light is within reach. Will continue to monitor for any changes.

## 2023-12-08 NOTE — OP REPORT
OPERATIVE NOTE   Edwin Hammond   12/08/23              PRE-OP DIAGNOSIS: multivessel CAD with NSTEMI this admission, HTN, HLD, morbid obesity, signficant cigarette smoking history, recent bronchitis symptoms, DEREK, asthma, daily marijuana smoker            POST-OP DIAGNOSIS: same            PROCEDURE:Coronary artery bypass grafting x3  Left internal mammary artery to left anterior descending artery  Reversed saphenous vein graft to obtuse marginal artery #3  Reversed saphenous vein graft to distal right coronary artery  Right coronary artery endarterectomy  Endo-vein procurement           SURGEON: Dr. Carolyn Davalos    FIRST ASSISTANT/ VEIN PROCUREMENT: UDAY Muller            ANESTHESIA: General endotracheal, Dr. Moshe Delgado    CARDIOPULMONARY BYPASS TIME: 92 minutes    AORTIC CROSSCLAMP TIME: 78 minutes                  DRAINS: mediastinal 24F bradford, 32F chest tubes x2            SPECIMENS: none     FINDINGS: very small targets as expected on angiogram, small LIMA with great flow, good venous conduit, distal right coronary artery with near-occlussive plaque with signs of intra-mural thrombus - endarterectomy performed for this area, low-end-of-normal LVEF           COMPLICATIONS: none identified            DISPOSITION: ICU            CONDITION: intubated, hemodynamically stable             Procedure details:      The patient was taken to the operating room. Standard monitoring lines and Beatty catheter were placed. General anesthesia was induced. The patient was prepped and draped in sterile fashion. An endoscopic procurement of the greater saphenous vein in the right leg was carried out by UDAY Muller. Standard median sternotomy was performed. The left internal mammary artery was taken down between cautery and clips. The patient was heparinized and the vessel divided distally. The heart was exposed and pericardial traction sutures placed. The distal aortic and triple stage right atrial venous  cannulation was performed. Antegrade cardioplegia catheter was placed. The patient was placed on cardiopulmonary bypass. The aorta was cross-clamped and the heart arrested with [place] cardioplegia. Myocardial protection was maintained with topical cooling.  The first graft was the reversed saphenous vein to the distal right coronary artery. Initially the posterior descending area was examined and there was almost not epicardial vessel consistent with angiogram. Next the distal right coronary was examined.. The artery at the chosen grafting site was exposed and entered. The target vessel was examined and circumferential occlusive plaque was noted. The arteriotomy was extended and endarterectomy was performed with successful removal of plaque. The lumen was open both distally and proximally. Then the anastomosis was performed in end-to-side fashion with 7-0 prolene suture. The second graft was the reversed saphenous vein to the the most distal obtuse marginal (called #2 on angiogram interpretation but in fact #3 both intra-op and on imaging). The artery at the chosen grafting site was exposed and entered. Then the anastomosis was performed in end-to-side fashion with 7-0 prolene suture. The last graft was the left internal mammary artery to the left anterior descending artery. The artery at the chosen grafting site was exposed and entered. Then the anastomosis was performed in end-to-side fashion with 7-0 prolene suture.The pedicle was secured to the epicardium with two 6-0 Prolene sutures. The proximal anastomoses were done in end-to-side fashion utilizing single clamp technique.   The heart was allowed 10 minutes to re-perfuse and RHIANNA was used to verify appropriate wall motion. The patient was then weaned and  from cardiopulmonary bypass. Protamine was given to reverse the heparin. The heart was decannulated. Ventricular pacing wires were placed. The chest tubes were placed. Hemostasis was secured then  the sternum was closed using stainless steel wires. The incision was closed in three layers with sutures. Sterile dressings were placed. At the end of the operation, all sponge and needle counts were correct.

## 2023-12-08 NOTE — ANESTHESIA TIME REPORT
Anesthesia Start and Stop Event Times       Date Time Event    12/8/2023 0735 Ready for Procedure     0754 Anesthesia Start     1258 Anesthesia Stop          Responsible Staff  12/08/23      Name Role Begin End    Moshe Delgado M.D. Anesth [id number]          Overtime Reason:  no overtime (within assigned shift)    Comments:

## 2023-12-08 NOTE — ANESTHESIA PROCEDURE NOTES
Arterial Line    Performed by: Moshe Delgado M.D.  Authorized by: Moshe Delgado M.D.    Start Time:  12/8/2023 8:01 AM  End Time:  12/8/2023 8:04 AM  Localization: ultrasound guidance and surface landmarks    Patient Location:  OR  Indication: continuous blood pressure monitoring and blood sampling needed        Catheter Size:  20 G  Seldinger Technique?: Yes    Laterality:  Left  Site:  Radial artery  Line Secured:  Antimicrobial disc, tape and transparent dressing  Events: patient tolerated procedure well with no complications

## 2023-12-08 NOTE — ASSESSMENT & PLAN NOTE
Patient underwent cardiac catheterization on 12/6/2023 which revealed severe multivessel coronary artery disease  Cardiac surgery on board  Patient scheduled for CABG tomorrow 12/8  N.p.o. at midnight

## 2023-12-08 NOTE — ANESTHESIA PROCEDURE NOTES
Peripheral IV    Date/Time: 12/8/2023 8:12 AM    Performed by: Moshe Delgado M.D.  Authorized by: Moshe Delgado M.D.    Size:  16 G  Laterality:  Left  Peripheral IV Location:  Upper arm  Local Anesthetic:  None  Site Prep:  Alcohol  Technique:  Direct puncture  Attempts:  1  Difficult IV necessitating physician skill: IV access difficult    Ultrasound Guidance: No

## 2023-12-08 NOTE — ASSESSMENT & PLAN NOTE
Procedure: CABG x3  LIMA-LAD, SVG-OM3, SVG-RCA  Date of surgery: 12/08/23   Surgeon: Anoop           Monitor hemodynamics and titrate pressor and inotropic support.   Follow chest tube output and correct coagulopathy.     Monitor for post cardiac surgery complication such as myocardial dysfunction, bleeding, tamponade, graft dysfunction, arrhythmia, respiratory failure, neurologic, renal and infectious complication.      Monitor and treat stress hyperglycemia.     Extubated 12/8

## 2023-12-08 NOTE — PROGRESS NOTES
[facility] Internal Medicine Daily Progress Note    Date of Service  12/7/2023    [facility] Team: R IM Purple Team   Attending: Roberta Smith M.d.  Senior Resident: Dr. Contreras  Intern:  Dr. Cosby  Contact Number: [phone]    Chief Complaint  Edwin Hammond is a 67 y.o. male admitted 12/5/2023 for NSTEMI.    Hospital Course  Edwin Hammond is a 67 y.o. male with past medical history of obesity, sleep apnea not on CPAP, hypertension, asthma who presented as a direct admit from [facility] with concern for chest pain on exertion, pressure-like, in the middle of the chest, alleviated by rest, lasting 2 to 3 minutes, in the last 4 to 5 days.     He went to an outside clinic, and was sent to [facility] by ambulance, receiving nitroglycerin and aspirin. Since that nitroglycerin dose, his chest pain stopped and did not reoccur. Troponin T 135 at OSH, given IV heparin bolus and transferred to [facility] for further workup.    Coronary Angiography performed 12/6/23, noted severe multivessel epicardial CAD.     Interval Problem Update  -No acute events overnight  -Patient reports being symptom-free.  Denies any chest pain, palpitations, nausea or vomiting.  Patient  -LHC revealed severe multivessel CAD  -Cardiac surgery on board .patient scheduled for CABG x1-3v tomorrow  -N.p.o. at midnight  -Anticoagulation to be held by surgery team    I have discussed this patient's plan of care and discharge plan at IDT rounds today with Case Management, Nursing, Nursing leadership, and other members of the IDT team.    Consultants/Specialty  cardiology    Code Status  Full Code    Disposition  Medically Cleared  I have placed the appropriate orders for post-discharge needs.    Review of Systems  Review of Systems   Constitutional:  Positive for diaphoresis. Negative for chills and fever.   HENT:  Negative for sore throat.    Eyes:  Negative for blurred vision and double vision.   Respiratory:  Negative for cough, shortness  of breath and wheezing.    Cardiovascular:  Negative for chest pain, palpitations and leg swelling.   Gastrointestinal:  Negative for abdominal pain, blood in stool, constipation, diarrhea, nausea and vomiting.   Genitourinary:  Negative for dysuria and hematuria.   Musculoskeletal:  Negative for joint pain and myalgias.   Neurological:  Negative for dizziness, seizures, loss of consciousness and weakness.        Physical Exam  Temp:  [36.2 °C (97.1 °F)-37.1 °C (98.8 °F)] 37 °C (98.6 °F)  Pulse:  [60-71] 62  Resp:  [16-18] 18  BP: (108-142)/(58-89) 128/74  SpO2:  [91 %-96 %] 91 %    Physical Exam  Constitutional:       General: He is not in acute distress.     Appearance: He is not ill-appearing or diaphoretic.   HENT:      Right Ear: There is no impacted cerumen.      Left Ear: There is no impacted cerumen.      Nose: No rhinorrhea.      Mouth/Throat:      Mouth: Mucous membranes are moist.   Eyes:      General: No scleral icterus.        Right eye: No discharge.         Left eye: No discharge.   Cardiovascular:      Rate and Rhythm: Normal rate and regular rhythm.      Heart sounds: No murmur heard.     No gallop.   Pulmonary:      Effort: Pulmonary effort is normal. No respiratory distress.      Breath sounds: No wheezing or rales.   Abdominal:      General: Abdomen is flat. There is no distension.      Tenderness: There is no abdominal tenderness. There is no guarding or rebound.   Musculoskeletal:      Right lower leg: No edema.      Left lower leg: No edema.   Skin:     Capillary Refill: Capillary refill takes less than 2 seconds.   Neurological:      General: No focal deficit present.      Mental Status: He is alert. Mental status is at baseline.         Fluids    Intake/Output Summary (Last 24 hours) at 12/7/2023 1728  Last data filed at 12/7/2023 1409  Gross per 24 hour   Intake 1020 ml   Output 2000 ml   Net -980 ml       Laboratory  Recent Labs     12/05/23  2333 12/07/23  0202 12/07/23  1259   WBC 8.5  8.9 9.1   RBC 5.63 5.81 6.05   HEMOGLOBIN 17.2 17.6 18.7*   HEMATOCRIT 51.7 53.1* 55.6*   MCV 91.8 91.4 91.9   MCH 30.6 30.3 30.9   MCHC 33.3 33.1 33.6   RDW 44.9 44.9 45.3   PLATELETCT 289 329 343   MPV 9.0 9.1 9.0     Recent Labs     12/05/23  2333 12/07/23  0202 12/07/23  1259   SODIUM 136 134* 134*   POTASSIUM 4.0 4.1 4.4   CHLORIDE 103 102 104   CO2 21 21 20   GLUCOSE 145* 96 113*   BUN 15 15 13   CREATININE 0.94 0.98 0.92   CALCIUM 9.5 8.5 9.3     Recent Labs     12/05/23 2333 12/07/23  1205   APTT 28.1 68.9*   INR 0.95 0.97         Recent Labs     12/05/23 2333   TRIGLYCERIDE 159*   HDL 42   *       Imaging  US-CAROTID DOPPLER BILAT   Final Result      US-VEIN MAPPING LOWER EXTREMITY BILAT   Final Result      EC-ECHOCARDIOGRAM COMPLETE W/O CONT   Final Result      DX-CHEST-2 VIEWS   Final Result         1. Slight bibasilar atelectasis      OUTSIDE IMAGES-DX CHEST   Final Result      CL-LEFT HEART CATHETERIZATION WITH POSSIBLE INTERVENTION    (Results Pending)        Assessment/Plan  Problem Representation:    * NSTEMI (non-ST elevated myocardial infarction) (HCC)- (present on admission)  Assessment & Plan  - 67-year-old male with morbid obesity, uncontrolled hypertension, presented with typical chest pain, elevated troponin without ST/T changes on EKG  - Seen by Cardiology, 12/6/23 cardiac cath performed demonstrating severe multivessel epicardial CAD     - Cardiology consulted, appreciate recommendations  -Cardiac surgery on board.  Patient to have CABG on 12/8  - Resuming Heparin drip post-procedure  - Aspirin  - Therapeutic heparin drip  - Echo  - Started on carvedilol and losartan    Coronary artery disease  Assessment & Plan  Patient underwent cardiac catheterization on 12/6/2023 which revealed severe multivessel coronary artery disease  Cardiac surgery on board  Patient scheduled for CABG tomorrow 12/8  N.p.o. at midnight      Cough  Assessment & Plan  - Chest x-ray: No acute abnormalities.  "COVID-19/influenza/RSV screen negative.  - Mentions some sinus congestion, along with occasional prior heartburn. Post nasal drip vs GERD    - Guaifenesin as needed    BMI 38- (present on admission)  Assessment & Plan  BMI 38    [facility]- (present on admission)  Assessment & Plan  He stated that he does not use CPAP  Compliance counseling    Essential hypertension- (present on admission)  Assessment & Plan  - Uncontrolled and reported readings around 180/100 prior to admission, noncompliant with amlodipine    - Started on Carvedilol and Losartan for cardioprotection benefit        Dyslipidemia- (present on admission)  Assessment & Plan  See \"NSTEMI\"    Mild intermittent asthma without complication- (present on admission)  Assessment & Plan  Albuterol as needed         VTE prophylaxis: therapeutic anticoagulation with heparin    I have performed a physical exam and reviewed and updated ROS and Plan today (12/7/2023). In review of yesterday's note (12/6/2023), there are no changes except as documented above.          "

## 2023-12-08 NOTE — ANESTHESIA PROCEDURE NOTES
RHIANNA    Date/Time: 12/8/2023 8:35 AM    Performed by: Moshe Delgado M.D.  Authorized by: Moshe Delgado M.D.    Start Time:12/8/2023 8:35 AM  Preanesthetic Checklist: patient identified, IV checked, site marked, risks and benefits discussed, surgical consent, monitors and equipment checked, pre-op evaluation and timeout performed    Indication for RHIANNA: diagnostic   Patient Location: OR  Intubated: Yes  Bite Block: Yes  Heart Visualized: Yes  Insertion: atraumatic    **See FULL RHIANNA report in patient's chart via [facility]**

## 2023-12-08 NOTE — CARE PLAN
Problem: Pre Op  Goal: Optimal preparation for CABG/Heart Valve surgery  Outcome: Progressing  Intervention: Pre op antibiotics ordered and on MAR  Note: In signed and held orders    The patient is Stable - Low risk of patient condition declining or worsening    Shift Goals  Clinical Goals: evaluation by CT surgery, mobilize  Patient Goals: get surgery prior to d/c  Family Goals: valentina    Progress made toward(s) clinical / shift goals:     Patient is not progressing towards the following goals:

## 2023-12-08 NOTE — CONSULTS
Critical Care History & Physical    Date of consult: 12/08/23    Referring Physician  Carolyn Davalos M.D.    Reason for Consultation  No chief complaint on file.      History of Presenting Illness  67 y.o. male with a history of HTN, DEREK, asthma and obesity was admitted to the hospital with an NSTEMI on 12/5.  He underwent cardiac catheterization on 12/6 which revealed severe multivessel coronary artery disease with an LVEDP of 25 m of mercury and normal LVEF.  He was referred for CABG which he underwent on Friday 12/8 with Dr. Davalos.    Code Status  Full Code    Review of Systems  Review of Systems   Unable to perform ROS: Intubated       Past Medical History   has a past medical history of Chickenpox, [place] measles, Influenza, Mumps, and Sleep apnea.    Surgical History  Reviewed and not pertinent    Family History  Reviewed and not pertinent    Social History   reports that he quit smoking about 22 years ago. His smoking use included cigarettes. He started smoking about 52 years ago. He has a 30.0 pack-year smoking history. He has never used smokeless tobacco. He reports that he does not drink alcohol and does not use drugs.    Medications  Home Medications       Reviewed by Pawel Joyce (Pharmacy Tech) on 12/06/23 at 1114  Med List Status: Complete     Medication Last Dose Status   Albuterol Sulfate 108 (90 BASE) MCG/ACT AEROSOL POWDER, BREATH ACTIVATED 12/5/2023 Active   amLODIPine (NORVASC) 5 MG Tab 12/5/2023 Active   Citalopram Hydrobromide 30 MG Cap 12/5/2023 Active   lovastatin (MEVACOR) 40 MG tablet 12/4/2023 Active   mometasone (ASMANEX) 220 MCG/INH inhaler 12/4/2023 Active   TESTOSTERONE CYPIONATE IM 12/3/2023 Active                    Allergies  No Known Allergies      Vital Signs last 24 hours  Temp:  [35.9 °C (96.6 °F)-37 °C (98.6 °F)] 35.9 °C (96.6 °F)  Pulse:  [62-82] 76  Resp:  [16-30] 22  BP: (114-156)/(60-88) 143/87  SpO2:  [91 %-96 %] 96 %      Physical Exam  Physical Exam  Vitals  and nursing note reviewed. Exam conducted with a chaperone present.   Constitutional:       General: He is not in acute distress.     Appearance: He is obese.      Interventions: He is intubated.   HENT:      Head: Normocephalic.      Mouth/Throat:      Mouth: Mucous membranes are moist.   Eyes:      Extraocular Movements: Extraocular movements intact.   Cardiovascular:      Rate and Rhythm: Normal rate and regular rhythm.      Pulses: Normal pulses.      Comments:   Mediastinal tubes in place, no active bleeding  Epicardial wires in place  Pulmonary:      Effort: Pulmonary effort is normal. No respiratory distress. He is intubated.   Abdominal:      General: There is no distension.      Palpations: Abdomen is soft.      Tenderness: There is no abdominal tenderness.   Musculoskeletal:         General: Normal range of motion.      Cervical back: Neck supple. No rigidity.   Skin:     General: Skin is warm and dry.      Capillary Refill: Capillary refill takes less than 2 seconds.   Neurological:      General: No focal deficit present.      Comments: Follows commands           Fluids    Intake/Output Summary (Last 24 hours) at 12/8/2023 1333  Last data filed at 12/8/2023 1256  Gross per 24 hour   Intake 4655.13 ml   Output 2155 ml   Net 2500.13 ml         Laboratory  Recent Results (from the past 48 hour(s))   POCT activated clotting time device results    Collection Time: 12/06/23  3:50 PM   Result Value Ref Range    Istat Activated Clotting Time 147 (H) 74 - 137 sec   Heparin Xa (Unfractionated)    Collection Time: 12/06/23 10:28 PM   Result Value Ref Range    Heparin Xa (UFH) 0.23 IU/mL   CBC WITHOUT DIFFERENTIAL    Collection Time: 12/07/23  2:02 AM   Result Value Ref Range    WBC 8.9 4.8 - 10.8 K/uL    RBC 5.81 4.70 - 6.10 M/uL    Hemoglobin 17.6 14.0 - 18.0 g/dL    Hematocrit 53.1 (H) 42.0 - 52.0 %    MCV 91.4 81.4 - 97.8 fL    MCH 30.3 27.0 - 33.0 pg    MCHC 33.1 32.3 - 36.5 g/dL    RDW 44.9 35.9 - 50.0 fL     Platelet Count 329 164 - 446 K/uL    MPV 9.1 9.0 - 12.9 fL   Basic Metabolic Panel    Collection Time: 12/07/23  2:02 AM   Result Value Ref Range    Sodium 134 (L) 135 - 145 mmol/L    Potassium 4.1 3.6 - 5.5 mmol/L    Chloride 102 96 - 112 mmol/L    Co2 21 20 - 33 mmol/L    Glucose 96 65 - 99 mg/dL    Bun 15 8 - 22 mg/dL    Creatinine 0.98 0.50 - 1.40 mg/dL    Calcium 8.5 8.5 - 10.5 mg/dL    Anion Gap 11.0 7.0 - 16.0   ESTIMATED GFR    Collection Time: 12/07/23  2:02 AM   Result Value Ref Range    GFR (CKD-EPI) 84 >60 mL/min/1.73 m 2   Heparin Xa (Unfractionated)    Collection Time: 12/07/23  6:08 AM   Result Value Ref Range    Heparin Xa (UFH) 0.46 IU/mL   Heparin Xa (Unfractionated)    Collection Time: 12/07/23 12:05 PM   Result Value Ref Range    Heparin Xa (UFH) 0.37 IU/mL   Prothrombin time (INR)    Collection Time: 12/07/23 12:05 PM   Result Value Ref Range    PT 12.9 12.0 - 14.6 sec    INR 0.97 0.87 - 1.13   APTT (PTT)    Collection Time: 12/07/23 12:05 PM   Result Value Ref Range    APTT 68.9 (H) 24.7 - 36.0 sec   Comp Metabolic Panel (CMP)    Collection Time: 12/07/23 12:59 PM   Result Value Ref Range    Sodium 134 (L) 135 - 145 mmol/L    Potassium 4.4 3.6 - 5.5 mmol/L    Chloride 104 96 - 112 mmol/L    Co2 20 20 - 33 mmol/L    Anion Gap 10.0 7.0 - 16.0    Glucose 113 (H) 65 - 99 mg/dL    Bun 13 8 - 22 mg/dL    Creatinine 0.92 0.50 - 1.40 mg/dL    Calcium 9.3 8.5 - 10.5 mg/dL    Correct Calcium 9.3 8.5 - 10.5 mg/dL    AST(SGOT) 11 (L) 12 - 45 U/L    ALT(SGPT) 15 2 - 50 U/L    Alkaline Phosphatase 66 30 - 99 U/L    Total Bilirubin 0.3 0.1 - 1.5 mg/dL    Albumin 4.0 3.2 - 4.9 g/dL    Total Protein 7.4 6.0 - 8.2 g/dL    Globulin 3.4 1.9 - 3.5 g/dL    A-G Ratio 1.2 g/dL   CBC with Differential    Collection Time: 12/07/23 12:59 PM   Result Value Ref Range    WBC 9.1 4.8 - 10.8 K/uL    RBC 6.05 4.70 - 6.10 M/uL    Hemoglobin 18.7 (H) 14.0 - 18.0 g/dL    Hematocrit 55.6 (H) 42.0 - 52.0 %    MCV 91.9 81.4 - 97.8  fL    MCH 30.9 27.0 - 33.0 pg    MCHC 33.6 32.3 - 36.5 g/dL    RDW 45.3 35.9 - 50.0 fL    Platelet Count 343 164 - 446 K/uL    MPV 9.0 9.0 - 12.9 fL    Neutrophils-Polys 58.70 44.00 - 72.00 %    Lymphocytes 30.00 22.00 - 41.00 %    Monocytes 8.90 0.00 - 13.40 %    Eosinophils 1.30 0.00 - 6.90 %    Basophils 0.80 0.00 - 1.80 %    Immature Granulocytes 0.30 0.00 - 0.90 %    Nucleated RBC 0.00 0.00 - 0.20 /100 WBC    Neutrophils (Absolute) 5.33 1.82 - 7.42 K/uL    Lymphs (Absolute) 2.73 1.00 - 4.80 K/uL    Monos (Absolute) 0.81 0.00 - 0.85 K/uL    Eos (Absolute) 0.12 0.00 - 0.51 K/uL    Baso (Absolute) 0.07 0.00 - 0.12 K/uL    Immature Granulocytes (abs) 0.03 0.00 - 0.11 K/uL    NRBC (Absolute) 0.00 K/uL   COD (Adult)    Collection Time: 23 12:59 PM   Result Value Ref Range    ABO Grouping Only A     Rh Grouping Only POS     Antibody Screen-Cod NEG    ABO Rh Confirm    Collection Time: 23 12:59 PM   Result Value Ref Range    ABO Rh Confirm A POS    ESTIMATED GFR    Collection Time: 23 12:59 PM   Result Value Ref Range    GFR (CKD-EPI) 91 >60 mL/min/1.73 m 2   EC-ECHOCARDIOGRAM COMPLETE W/O CONT    Collection Time: 23  2:24 PM   Result Value Ref Range    Left Ventrical Ejection Fraction 55    EKG    Collection Time: 23  2:30 PM   Result Value Ref Range    Report       [facility]    Test Date:  2023  Pt Name:    AMBER GARNER              Department: NS  MRN:        [id number]                      Room:       [facility]  Gender:     Male                         Technician: CEEJULIENNE  :        1955                   Requested By:YANNA YOO  Order #:    [id number]                    Reading MD: Yuri Nguyễn MD    Measurements  Intervals                                Axis  Rate:       70                           P:          40  OH:         163                          QRS:        21  QRSD:       81                           T:          31  QT:         391  QTc:         422    Interpretive Statements  Sinus arrhythmia  Minimal ST elevation, anterior leads  Compared to ECG 12/06/2023 08:59:42  Sinus rhythm no longer present  ST (T wave) deviation still present  Electronically Signed On 12- 16:28:20 PST by Yuri Nguyễn MD     Urinalysis    Collection Time: 12/07/23  4:45 PM    Specimen: Urine, Clean Catch   Result Value Ref Range    Color Yellow     Character Clear     Specific Gravity 1.015 <1.035    Ph 5.5 5.0 - 8.0    Glucose Negative Negative mg/dL    Ketones 15 (A) Negative mg/dL    Protein 100 (A) Negative mg/dL    Bilirubin Negative Negative    Urobilinogen, Urine 0.2 Negative    Nitrite Negative Negative    Leukocyte Esterase Negative Negative    Occult Blood Negative Negative    Micro Urine Req Microscopic    URINE MICROSCOPIC (W/UA)    Collection Time: 12/07/23  4:45 PM   Result Value Ref Range    WBC 0-2 (A) /hpf    RBC 0-2 (A) /hpf    Bacteria Negative None /hpf    Epithelial Cells Negative /hpf    Hyaline Cast 0-2 /lpf   Basic Metabolic Panel    Collection Time: 12/08/23  4:40 AM   Result Value Ref Range    Sodium 136 135 - 145 mmol/L    Potassium 4.4 3.6 - 5.5 mmol/L    Chloride 105 96 - 112 mmol/L    Co2 23 20 - 33 mmol/L    Glucose 106 (H) 65 - 99 mg/dL    Bun 16 8 - 22 mg/dL    Creatinine 1.09 0.50 - 1.40 mg/dL    Calcium 9.0 8.5 - 10.5 mg/dL    Anion Gap 8.0 7.0 - 16.0   ESTIMATED GFR    Collection Time: 12/08/23  4:40 AM   Result Value Ref Range    GFR (CKD-EPI) 74 >60 mL/min/1.73 m 2   POCT glucose device results    Collection Time: 12/08/23  5:51 AM   Result Value Ref Range    POC Glucose, Blood 118 (H) 65 - 99 mg/dL   POCT activated clotting time device results    Collection Time: 12/08/23  9:05 AM   Result Value Ref Range    Istat Activated Clotting Time 136 74 - 137 sec   POCT arterial blood gas device results    Collection Time: 12/08/23  9:06 AM   Result Value Ref Range    Ph 7.270 (LL) 7.400 - 7.500    Pco2 46.3 (H) 26.0 - 37.0 mmHg    Po2 68 64 - 87  mmHg    Tco2 23 20 - 33 mmol/L    S02 90 (L) 93 - 99 %    Hco3 21.2 17.0 - 25.0 mmol/L    BE -6 (L) -4 - 3 mmol/L    Body Temp 36.2 C degrees    Ph Temp Areli 7.281 (LL) 7.400 - 7.500    Pco2 Temp Co 44.7 (H) 26.0 - 37.0 mmHg    Po2 Temp Cor 64 64 - 87 mmHg    Specimen Arterial    POCT sodium device results    Collection Time: 12/08/23  9:06 AM   Result Value Ref Range    Istat Sodium 136 135 - 145 mmol/L   POCT potassium device results    Collection Time: 12/08/23  9:06 AM   Result Value Ref Range    Istat Potassium 4.2 3.6 - 5.5 mmol/L   POCT ionized CA device results    Collection Time: 12/08/23  9:06 AM   Result Value Ref Range    Istat Ionized Calcium 1.23 1.10 - 1.30 mmol/L   POCT hematocrit and hemoglobin device results    Collection Time: 12/08/23  9:06 AM   Result Value Ref Range    Istat Hematocrit 55 (H) 42 - 52 %    Istat Hemoglobin 18.7 (H) 14.0 - 18.0 g/dL   POCT arterial blood gas device results    Collection Time: 12/08/23  9:16 AM   Result Value Ref Range    Ph 7.233 (LL) 7.400 - 7.500    Pco2 52.4 (HH) 26.0 - 37.0 mmHg    Po2 96 (H) 64 - 87 mmHg    Tco2 24 20 - 33 mmol/L    S02 96 93 - 99 %    Hco3 22.1 17.0 - 25.0 mmol/L    BE -6 (L) -4 - 3 mmol/L    Body Temp 36.2 C degrees    Ph Temp Areli 7.244 (LL) 7.400 - 7.500    Pco2 Temp Co 50.6 (H) 26.0 - 37.0 mmHg    Po2 Temp Cor 92 (H) 64 - 87 mmHg    Specimen Arterial    POCT sodium device results    Collection Time: 12/08/23  9:16 AM   Result Value Ref Range    Istat Sodium 136 135 - 145 mmol/L   POCT potassium device results    Collection Time: 12/08/23  9:16 AM   Result Value Ref Range    Istat Potassium 4.4 3.6 - 5.5 mmol/L   POCT ionized CA device results    Collection Time: 12/08/23  9:16 AM   Result Value Ref Range    Istat Ionized Calcium 1.20 1.10 - 1.30 mmol/L   POCT hematocrit and hemoglobin device results    Collection Time: 12/08/23  9:16 AM   Result Value Ref Range    Istat Hematocrit 52 42 - 52 %    Istat Hemoglobin 17.7 14.0 - 18.0 g/dL    POCT activated clotting time device results    Collection Time: 12/08/23  9:43 AM   Result Value Ref Range    Istat Activated Clotting Time 542 (H) 74 - 137 sec   POCT arterial blood gas device results    Collection Time: 12/08/23  9:44 AM   Result Value Ref Range    Ph 7.176 (LL) 7.400 - 7.500    Pco2 64.2 (HH) 26.0 - 37.0 mmHg    Po2 158 (H) 64 - 87 mmHg    Tco2 26 20 - 33 mmol/L    S02 99 93 - 99 %    Hco3 23.8 17.0 - 25.0 mmol/L    BE -6 (L) -4 - 3 mmol/L    Body Temp 36.0 C degrees    Ph Temp Areli 7.189 (LL) 7.400 - 7.500    Pco2 Temp Co 61.5 (HH) 26.0 - 37.0 mmHg    Po2 Temp Cor 152 (H) 64 - 87 mmHg    Specimen Arterial    POCT sodium device results    Collection Time: 12/08/23  9:44 AM   Result Value Ref Range    Istat Sodium 135 135 - 145 mmol/L   POCT potassium device results    Collection Time: 12/08/23  9:44 AM   Result Value Ref Range    Istat Potassium 4.8 3.6 - 5.5 mmol/L   POCT ionized CA device results    Collection Time: 12/08/23  9:44 AM   Result Value Ref Range    Istat Ionized Calcium 1.21 1.10 - 1.30 mmol/L   POCT hematocrit and hemoglobin device results    Collection Time: 12/08/23  9:44 AM   Result Value Ref Range    Istat Hematocrit 52 42 - 52 %    Istat Hemoglobin 17.7 14.0 - 18.0 g/dL   POCT activated clotting time device results    Collection Time: 12/08/23 10:36 AM   Result Value Ref Range    Istat Activated Clotting Time 450 (H) 74 - 137 sec   POCT arterial blood gas device results    Collection Time: 12/08/23 10:36 AM   Result Value Ref Range    Ph 7.332 (L) 7.400 - 7.500    Pco2 41.6 (H) 26.0 - 37.0 mmHg    Po2 >500 (H) 64 - 87 mmHg    Tco2 23 20 - 33 mmol/L    S02 100 (H) 93 - 99 %    Hco3 22.0 17.0 - 25.0 mmol/L    BE -4 -4 - 3 mmol/L    Body Temp 35.6 C degrees    Ph Temp Areli 7.352 (L) 7.400 - 7.500    Pco2 Temp Co 39.1 (H) 26.0 - 37.0 mmHg    Po2 Temp Cor 495 (H) 64 - 87 mmHg    Specimen Arterial    POCT sodium device results    Collection Time: 12/08/23 10:36 AM   Result Value  Ref Range    Istat Sodium 129 (L) 135 - 145 mmol/L   POCT potassium device results    Collection Time: 12/08/23 10:36 AM   Result Value Ref Range    Istat Potassium 6.6 (HH) 3.6 - 5.5 mmol/L   POCT ionized CA device results    Collection Time: 12/08/23 10:36 AM   Result Value Ref Range    Istat Ionized Calcium 0.95 (L) 1.10 - 1.30 mmol/L   POCT hematocrit and hemoglobin device results    Collection Time: 12/08/23 10:36 AM   Result Value Ref Range    Istat Hematocrit 38 (L) 42 - 52 %    Istat Hemoglobin 12.9 (L) 14.0 - 18.0 g/dL   POCT activated clotting time device results    Collection Time: 12/08/23 11:05 AM   Result Value Ref Range    Istat Activated Clotting Time 498 (H) 74 - 137 sec   POCT arterial blood gas device results    Collection Time: 12/08/23 11:06 AM   Result Value Ref Range    Ph 7.384 (L) 7.400 - 7.500    Pco2 40.1 (H) 26.0 - 37.0 mmHg    Po2 310 (H) 64 - 87 mmHg    Tco2 25 20 - 33 mmol/L    S02 100 (H) 93 - 99 %    Hco3 24.0 17.0 - 25.0 mmol/L    BE -1 -4 - 3 mmol/L    Body Temp 34.8 C degrees    Ph Temp Areli 7.416 7.400 - 7.500    Pco2 Temp Co 36.4 26.0 - 37.0 mmHg    Po2 Temp Cor 299 (H) 64 - 87 mmHg    Specimen Arterial    POCT sodium device results    Collection Time: 12/08/23 11:06 AM   Result Value Ref Range    Istat Sodium 131 (L) 135 - 145 mmol/L   POCT potassium device results    Collection Time: 12/08/23 11:06 AM   Result Value Ref Range    Istat Potassium 6.5 (H) 3.6 - 5.5 mmol/L   POCT ionized CA device results    Collection Time: 12/08/23 11:06 AM   Result Value Ref Range    Istat Ionized Calcium 0.99 (L) 1.10 - 1.30 mmol/L   POCT hematocrit and hemoglobin device results    Collection Time: 12/08/23 11:06 AM   Result Value Ref Range    Istat Hematocrit 40 (L) 42 - 52 %    Istat Hemoglobin 13.6 (L) 14.0 - 18.0 g/dL   POCT activated clotting time device results    Collection Time: 12/08/23 11:27 AM   Result Value Ref Range    Istat Activated Clotting Time 509 (H) 74 - 137 sec   POCT  arterial blood gas device results    Collection Time: 12/08/23 11:27 AM   Result Value Ref Range    Ph 7.390 (L) 7.400 - 7.500    Pco2 37.5 (H) 26.0 - 37.0 mmHg    Po2 276 (H) 64 - 87 mmHg    Tco2 24 20 - 33 mmol/L    S02 100 (H) 93 - 99 %    Hco3 22.7 17.0 - 25.0 mmol/L    BE -2 -4 - 3 mmol/L    Body Temp 35.9 C degrees    Ph Temp Areli 7.407 7.400 - 7.500    Pco2 Temp Co 35.8 26.0 - 37.0 mmHg    Po2 Temp Cor 271 (H) 64 - 87 mmHg    Specimen Arterial    POCT sodium device results    Collection Time: 12/08/23 11:27 AM   Result Value Ref Range    Istat Sodium 132 (L) 135 - 145 mmol/L   POCT potassium device results    Collection Time: 12/08/23 11:27 AM   Result Value Ref Range    Istat Potassium 6.3 (H) 3.6 - 5.5 mmol/L   POCT ionized CA device results    Collection Time: 12/08/23 11:27 AM   Result Value Ref Range    Istat Ionized Calcium 1.02 (L) 1.10 - 1.30 mmol/L   POCT hematocrit and hemoglobin device results    Collection Time: 12/08/23 11:27 AM   Result Value Ref Range    Istat Hematocrit 42 42 - 52 %    Istat Hemoglobin 14.3 14.0 - 18.0 g/dL   POCT arterial blood gas device results    Collection Time: 12/08/23 11:45 AM   Result Value Ref Range    Ph 7.402 7.400 - 7.500    Pco2 39.8 (H) 26.0 - 37.0 mmHg    Po2 329 (H) 64 - 87 mmHg    Tco2 26 20 - 33 mmol/L    S02 100 (H) 93 - 99 %    Hco3 24.7 17.0 - 25.0 mmol/L    BE 0 -4 - 3 mmol/L    Body Temp 36.4 C degrees    Ph Temp Areli 7.411 7.400 - 7.500    Pco2 Temp Co 38.7 (H) 26.0 - 37.0 mmHg    Po2 Temp Cor 326 (H) 64 - 87 mmHg    Specimen Arterial    POCT sodium device results    Collection Time: 12/08/23 11:45 AM   Result Value Ref Range    Istat Sodium 133 (L) 135 - 145 mmol/L   POCT potassium device results    Collection Time: 12/08/23 11:45 AM   Result Value Ref Range    Istat Potassium 6.0 (H) 3.6 - 5.5 mmol/L   POCT ionized CA device results    Collection Time: 12/08/23 11:45 AM   Result Value Ref Range    Istat Ionized Calcium 1.20 1.10 - 1.30 mmol/L   POCT  hematocrit and hemoglobin device results    Collection Time: 23 11:45 AM   Result Value Ref Range    Istat Hematocrit 40 (L) 42 - 52 %    Istat Hemoglobin 13.6 (L) 14.0 - 18.0 g/dL   POCT activated clotting time device results    Collection Time: 23 12:06 PM   Result Value Ref Range    Istat Activated Clotting Time 131 74 - 137 sec   POCT arterial blood gas device results    Collection Time: 23 12:09 PM   Result Value Ref Range    Ph 7.341 (L) 7.400 - 7.500    Pco2 43.5 (H) 26.0 - 37.0 mmHg    Po2 132 (H) 64 - 87 mmHg    Tco2 25 20 - 33 mmol/L    S02 99 93 - 99 %    Hco3 23.5 17.0 - 25.0 mmol/L    BE -2 -4 - 3 mmol/L    Body Temp 36.5 C degrees    Ph Temp Areli 7.348 (L) 7.400 - 7.500    Pco2 Temp Co 42.6 (H) 26.0 - 37.0 mmHg    Po2 Temp Cor 129 (H) 64 - 87 mmHg    Specimen Arterial    POCT sodium device results    Collection Time: 23 12:09 PM   Result Value Ref Range    Istat Sodium 135 135 - 145 mmol/L   POCT potassium device results    Collection Time: 23 12:09 PM   Result Value Ref Range    Istat Potassium 5.2 3.6 - 5.5 mmol/L   POCT ionized CA device results    Collection Time: 23 12:09 PM   Result Value Ref Range    Istat Ionized Calcium 1.21 1.10 - 1.30 mmol/L   POCT hematocrit and hemoglobin device results    Collection Time: 23 12:09 PM   Result Value Ref Range    Istat Hematocrit 42 42 - 52 %    Istat Hemoglobin 14.3 14.0 - 18.0 g/dL   EKG on arrival to CSU    Collection Time: 23  1:11 PM   Result Value Ref Range    Report       [facility]    Test Date:  2023  Pt Name:    AMBER GARNER              Department: 161  MRN:        [id number]                      [facility]  Gender:     Male                         Technician: [facility]  :        1955                   Requested By:MARY CLEMENT  Order #:    [id number]                    Prem MD:    Measurements  Intervals                                Axis  Rate:       76                            P:          3  NE:         189                          QRS:        -5  QRSD:       80                           T:          -26  QT:         393  QTc:        442    Interpretive Statements  SINUS RHYTHM  NONSPECIFIC T ABNORMALITIES, INFERIOR LEADS  ST ELEVATION, CONSIDER ANTERIOR INJURY  Compared to ECG 12/07/2023 14:30:28  T-wave abnormality now present  Myocardial infarct finding now present  Sinus arrhythmia no longer present  ST (T wave) deviation still present           Imaging  DX-CHEST-PORTABLE (1 VIEW)   Final Result      1.  Status post median sternotomy with placement of mediastinal drains.   2.  Interval placement of an endotracheal tube which terminates in satisfactory position at the level of the aortic arch.   3.  Interval insertion of a central venous catheter which terminates with the tip projecting over the expected region of the proximal mid superior vena cava.   4.  Mild interstitial edema.   5.  Left basilar atelectasis and/or consolidation.      US-CAROTID DOPPLER BILAT   Final Result      US-VEIN MAPPING LOWER EXTREMITY BILAT   Final Result      EC-ECHOCARDIOGRAM COMPLETE W/O CONT   Final Result      DX-CHEST-2 VIEWS   Final Result         1. Slight bibasilar atelectasis      OUTSIDE IMAGES-DX CHEST   Final Result      CL-LEFT HEART CATHETERIZATION WITH POSSIBLE INTERVENTION    (Results Pending)         Assessment/Plan  * S/P CABG x 3  Assessment & Plan  Procedure: CABG x3  LIMA-LAD, SVG-OM3, SVG-RCA  Date of surgery: 12/08/23   Surgeon: Anoop           Monitor hemodynamics and titrate pressor and inotropic support.   Follow chest tube output and correct coagulopathy.     Monitor for post cardiac surgery complication such as myocardial dysfunction, bleeding, tamponade, graft dysfunction, arrhythmia, respiratory failure, neurologic, renal and infectious complication.      Monitor and treat stress hyperglycemia.     Extubate as soon as clinically possible     Coronary artery  disease  Assessment & Plan  LAD with 95% mid stenosis, OM 2 with 95% stenosis, RCA with 70% mid stenosis and long segment of 80 to-90% distal stenosis    Now s/p CABG    Aspirin  Statin    On mechanically assisted ventilation (HCC)  Assessment & Plan  Intubated date: 12/8  Reason intubated: Cardiac surgery  GI prophylaxis: PPI per cardiac surgery protocol  Monitor ventilator waveforms & blood gases, titrate flow/peep and volumes according.   Daily SAT/SBT  All ventilator bundles are in place     Extubate as soon as clinically possible    NSTEMI (non-ST elevated myocardial infarction) (HCC)- (present on admission)  Assessment & Plan  S/p cardiac catheterization with severe triple-vessel disease  Cardiac surgery for CABG on 12/8    BMI 38- (present on admission)  Assessment & Plan  Dietary  Lifestyle modifications    DEREK- (present on admission)  Assessment & Plan  CPAP nightly once extubated    Essential hypertension- (present on admission)  Assessment & Plan  Resume home meds when able    Dyslipidemia- (present on admission)  Assessment & Plan  Statin    Mild intermittent asthma without complication- (present on admission)  Assessment & Plan  PRN inhalers, not in acute exacerbation        VTE:  Per cardiac surgery  Ulcer: PPI  Lines: Central Line  Ongoing indication addressed, Arterial Line  Ongoing indication addressed and Beatty Catheter  Ongoing indication addressed      I have performed a physical exam and reviewed and updated ROS and Plan today (12/8/2023). In review of yesterday's note (12/7/2023), there are no changes except as documented above.     Discussed patient condition and risk of morbidity and/or mortality with Family, RN, RT, Pharmacy, , Charge nurse / hot rounds, Patient and CVS      The patient remains critically ill.  Critical care time = 64 minutes in directly providing and coordinating critical care and extensive data review.  No time overlap and excludes procedures.

## 2023-12-08 NOTE — ANESTHESIA PROCEDURE NOTES
Central Venous Line    Performed by: Moshe Delgado M.D.  Authorized by: Moshe Delgado M.D.    Start Time:  12/8/2023 8:18 AM  End Time:  12/8/2023 8:28 AM  Patient Location:  OR  Indication: central venous access        provider hand hygiene performed prior to central venous catheter insertion, all 5 sterile barriers used (gloves, gown, cap, mask, large sterile drape) during central venous catheter insertion and skin prep agent completely dried prior to procedure    Patient Position:  Trendelenburg  Laterality:  Left  Site:  Subclavian  Prep:  Chlorhexidine  Catheter Size:  7 Fr  Catheter Length (cm):  20  Number of Lumens:  Triple lumen  target vein identified, needle advanced into vein and blood aspirated and guidewire advanced into vein    Seldinger Technique?: Yes    Ultrasound-Guided: surface landmarks    Intravenous Verification: venous blood return and chest x-ray pending    all ports aspirated, all ports flushed easily, guidewire was removed intact, biopatch was applied, line was sutured in place and dressing was applied    Events: patient tolerated procedure well with no complications    PA Catheter Placed?: No

## 2023-12-08 NOTE — ANESTHESIA PROCEDURE NOTES
Airway    Date/Time: 12/8/2023 8:09 AM    Performed by: Moshe Delgado M.D.  Authorized by: Moshe Delgado M.D.    Location:  OR  Urgency:  Elective  Difficult Airway: No    Indications for Airway Management:  Anesthesia      Spontaneous Ventilation: absent    Sedation Level:  Deep  Preoxygenated: Yes    Patient Position:  Sniffing  Mask Difficulty Assessment:  2 - vent by mask + OA or adjuvant +/- NMBA  Final Airway Type:  Endotracheal airway  Final Endotracheal Airway:  ETT  Cuffed: Yes    Technique Used for Successful ETT Placement:  Direct laryngoscopy  Devices/Methods Used in Placement:  Intubating stylet    Insertion Site:  Oral  Blade Type:  Savage  Laryngoscope Blade/Videolaryngoscope Blade Size:  3  ETT Size (mm):  8.5  Measured from:  Teeth  ETT to Teeth (cm):  23  Placement Verified by: auscultation and capnometry    Cormack-Lehane Classification:  Grade I - full view of glottis  Number of Attempts at Approach:  1

## 2023-12-08 NOTE — PROGRESS NOTES
Report given to CIC RN. CIC RN at bedside transporting pt on remote tele. Pt is now off the unit and care for the pt has ended.

## 2023-12-08 NOTE — DIETARY
Nutrition Services: Day 3 of admit.  Edwin Hammond is a 67 y.o. male with admitting DX of NSTEMI (non-ST elevated myocardial infarction) (HCC).    Consult received for Cardiac Rehab Diet Education (repeat).  S/p CABG x3 today.  Diet education provided by RD 12/7 per prior consult.

## 2023-12-09 ENCOUNTER — APPOINTMENT (OUTPATIENT)
Dept: RADIOLOGY | Facility: MEDICAL CENTER | Age: 68
DRG: 232 | End: 2023-12-09
Attending: NURSE PRACTITIONER
Payer: MEDICARE

## 2023-12-09 LAB
ANION GAP SERPL CALC-SCNC: 12 MMOL/L (ref 7–16)
BUN SERPL-MCNC: 15 MG/DL (ref 8–22)
CALCIUM SERPL-MCNC: 7.5 MG/DL (ref 8.5–10.5)
CHLORIDE SERPL-SCNC: 103 MMOL/L (ref 96–112)
CO2 SERPL-SCNC: 21 MMOL/L (ref 20–33)
CREAT SERPL-MCNC: 0.84 MG/DL (ref 0.5–1.4)
EKG IMPRESSION: NORMAL
ERYTHROCYTE [DISTWIDTH] IN BLOOD BY AUTOMATED COUNT: 45.4 FL (ref 35.9–50)
GFR SERPLBLD CREATININE-BSD FMLA CKD-EPI: 95 ML/MIN/1.73 M 2
GLUCOSE BLD STRIP.AUTO-MCNC: 106 MG/DL (ref 65–99)
GLUCOSE BLD STRIP.AUTO-MCNC: 111 MG/DL (ref 65–99)
GLUCOSE BLD STRIP.AUTO-MCNC: 114 MG/DL (ref 65–99)
GLUCOSE BLD STRIP.AUTO-MCNC: 115 MG/DL (ref 65–99)
GLUCOSE BLD STRIP.AUTO-MCNC: 122 MG/DL (ref 65–99)
GLUCOSE BLD STRIP.AUTO-MCNC: 124 MG/DL (ref 65–99)
GLUCOSE BLD STRIP.AUTO-MCNC: 126 MG/DL (ref 65–99)
GLUCOSE BLD STRIP.AUTO-MCNC: 132 MG/DL (ref 65–99)
GLUCOSE SERPL-MCNC: 120 MG/DL (ref 65–99)
HCT VFR BLD AUTO: 44.4 % (ref 42–52)
HGB BLD-MCNC: 14.7 G/DL (ref 14–18)
MCH RBC QN AUTO: 30.8 PG (ref 27–33)
MCHC RBC AUTO-ENTMCNC: 33.1 G/DL (ref 32.3–36.5)
MCV RBC AUTO: 92.9 FL (ref 81.4–97.8)
PLATELET # BLD AUTO: 256 K/UL (ref 164–446)
PMV BLD AUTO: 9.4 FL (ref 9–12.9)
POTASSIUM SERPL-SCNC: 4.4 MMOL/L (ref 3.6–5.5)
POTASSIUM SERPL-SCNC: 4.5 MMOL/L (ref 3.6–5.5)
RBC # BLD AUTO: 4.78 M/UL (ref 4.7–6.1)
SODIUM SERPL-SCNC: 136 MMOL/L (ref 135–145)
WBC # BLD AUTO: 18.4 K/UL (ref 4.8–10.8)

## 2023-12-09 PROCEDURE — A9270 NON-COVERED ITEM OR SERVICE: HCPCS | Performed by: NURSE PRACTITIONER

## 2023-12-09 PROCEDURE — 700102 HCHG RX REV CODE 250 W/ 637 OVERRIDE(OP): Performed by: INTERNAL MEDICINE

## 2023-12-09 PROCEDURE — 85027 COMPLETE CBC AUTOMATED: CPT

## 2023-12-09 PROCEDURE — 93010 ELECTROCARDIOGRAM REPORT: CPT | Performed by: INTERNAL MEDICINE

## 2023-12-09 PROCEDURE — 82962 GLUCOSE BLOOD TEST: CPT | Mod: 91

## 2023-12-09 PROCEDURE — 700111 HCHG RX REV CODE 636 W/ 250 OVERRIDE (IP): Mod: JZ,JG | Performed by: NURSE PRACTITIONER

## 2023-12-09 PROCEDURE — C9248 INJ, CLEVIDIPINE BUTYRATE: HCPCS | Mod: JZ,JG | Performed by: NURSE PRACTITIONER

## 2023-12-09 PROCEDURE — 770022 HCHG ROOM/CARE - ICU (200)

## 2023-12-09 PROCEDURE — 93005 ELECTROCARDIOGRAM TRACING: CPT | Performed by: NURSE PRACTITIONER

## 2023-12-09 PROCEDURE — 71045 X-RAY EXAM CHEST 1 VIEW: CPT

## 2023-12-09 PROCEDURE — A9270 NON-COVERED ITEM OR SERVICE: HCPCS | Performed by: INTERNAL MEDICINE

## 2023-12-09 PROCEDURE — 700105 HCHG RX REV CODE 258: Performed by: NURSE PRACTITIONER

## 2023-12-09 PROCEDURE — 700102 HCHG RX REV CODE 250 W/ 637 OVERRIDE(OP): Performed by: NURSE PRACTITIONER

## 2023-12-09 PROCEDURE — 99233 SBSQ HOSP IP/OBS HIGH 50: CPT | Performed by: STUDENT IN AN ORGANIZED HEALTH CARE EDUCATION/TRAINING PROGRAM

## 2023-12-09 PROCEDURE — 80048 BASIC METABOLIC PNL TOTAL CA: CPT

## 2023-12-09 PROCEDURE — 94669 MECHANICAL CHEST WALL OSCILL: CPT

## 2023-12-09 PROCEDURE — 84132 ASSAY OF SERUM POTASSIUM: CPT

## 2023-12-09 RX ORDER — HYDRALAZINE HYDROCHLORIDE 20 MG/ML
20 INJECTION INTRAMUSCULAR; INTRAVENOUS EVERY 4 HOURS PRN
Status: DISCONTINUED | OUTPATIENT
Start: 2023-12-09 | End: 2023-12-13 | Stop reason: HOSPADM

## 2023-12-09 RX ORDER — AMLODIPINE BESYLATE 5 MG/1
5 TABLET ORAL
Status: DISCONTINUED | OUTPATIENT
Start: 2023-12-09 | End: 2023-12-13 | Stop reason: HOSPADM

## 2023-12-09 RX ADMIN — ACETAMINOPHEN 1000 MG: 500 TABLET, FILM COATED ORAL at 23:51

## 2023-12-09 RX ADMIN — CLOPIDOGREL BISULFATE 75 MG: 75 TABLET ORAL at 05:38

## 2023-12-09 RX ADMIN — FENTANYL CITRATE 50 MCG: 50 INJECTION, SOLUTION INTRAMUSCULAR; INTRAVENOUS at 04:12

## 2023-12-09 RX ADMIN — DOCUSATE SODIUM 50 MG AND SENNOSIDES 8.6 MG 2 TABLET: 8.6; 5 TABLET, FILM COATED ORAL at 17:11

## 2023-12-09 RX ADMIN — ATORVASTATIN CALCIUM 80 MG: 80 TABLET, FILM COATED ORAL at 17:11

## 2023-12-09 RX ADMIN — ENOXAPARIN SODIUM 40 MG: 100 INJECTION SUBCUTANEOUS at 17:10

## 2023-12-09 RX ADMIN — POLYETHYLENE GLYCOL 3350 1 PACKET: 17 POWDER, FOR SOLUTION ORAL at 05:37

## 2023-12-09 RX ADMIN — METOPROLOL TARTRATE 12.5 MG: 25 TABLET, FILM COATED ORAL at 05:38

## 2023-12-09 RX ADMIN — METOPROLOL TARTRATE 12.5 MG: 25 TABLET, FILM COATED ORAL at 17:11

## 2023-12-09 RX ADMIN — FLUTICASONE PROPIONATE 110 MCG: 110 AEROSOL, METERED RESPIRATORY (INHALATION) at 20:57

## 2023-12-09 RX ADMIN — ASPIRIN 81 MG: 81 TABLET, COATED ORAL at 05:38

## 2023-12-09 RX ADMIN — OMEPRAZOLE 20 MG: 20 CAPSULE, DELAYED RELEASE ORAL at 05:37

## 2023-12-09 RX ADMIN — OXYCODONE HYDROCHLORIDE 10 MG: 10 TABLET ORAL at 03:02

## 2023-12-09 RX ADMIN — OXYCODONE HYDROCHLORIDE 10 MG: 10 TABLET ORAL at 20:55

## 2023-12-09 RX ADMIN — OXYCODONE HYDROCHLORIDE 10 MG: 10 TABLET ORAL at 06:03

## 2023-12-09 RX ADMIN — Medication 1 APPLICATOR: at 08:34

## 2023-12-09 RX ADMIN — FLUTICASONE PROPIONATE 110 MCG: 110 AEROSOL, METERED RESPIRATORY (INHALATION) at 07:40

## 2023-12-09 RX ADMIN — OXYCODONE HYDROCHLORIDE 10 MG: 10 TABLET ORAL at 17:11

## 2023-12-09 RX ADMIN — ACETAMINOPHEN 1000 MG: 500 TABLET, FILM COATED ORAL at 12:11

## 2023-12-09 RX ADMIN — ACETAMINOPHEN 1000 MG: 500 TABLET, FILM COATED ORAL at 05:37

## 2023-12-09 RX ADMIN — Medication 1 APPLICATOR: at 20:58

## 2023-12-09 RX ADMIN — FENTANYL CITRATE 50 MCG: 50 INJECTION, SOLUTION INTRAMUSCULAR; INTRAVENOUS at 01:04

## 2023-12-09 RX ADMIN — FENTANYL CITRATE 50 MCG: 50 INJECTION, SOLUTION INTRAMUSCULAR; INTRAVENOUS at 18:37

## 2023-12-09 RX ADMIN — CLEVIPIDINE 3 MG/HR: 0.5 EMULSION INTRAVENOUS at 04:13

## 2023-12-09 RX ADMIN — AMLODIPINE BESYLATE 5 MG: 10 TABLET ORAL at 08:34

## 2023-12-09 RX ADMIN — SODIUM CHLORIDE: 9 INJECTION, SOLUTION INTRAVENOUS at 12:59

## 2023-12-09 RX ADMIN — FENTANYL CITRATE 50 MCG: 50 INJECTION, SOLUTION INTRAMUSCULAR; INTRAVENOUS at 12:10

## 2023-12-09 RX ADMIN — CITALOPRAM HYDROBROMIDE 30 MG: 20 TABLET ORAL at 05:38

## 2023-12-09 RX ADMIN — ACETAMINOPHEN 1000 MG: 500 TABLET, FILM COATED ORAL at 17:11

## 2023-12-09 RX ADMIN — OXYCODONE HYDROCHLORIDE 10 MG: 10 TABLET ORAL at 23:52

## 2023-12-09 RX ADMIN — CLEVIPIDINE 6 MG/HR: 0.5 EMULSION INTRAVENOUS at 06:50

## 2023-12-09 RX ADMIN — OXYCODONE HYDROCHLORIDE 10 MG: 10 TABLET ORAL at 10:28

## 2023-12-09 RX ADMIN — FENTANYL CITRATE 50 MCG: 50 INJECTION, SOLUTION INTRAMUSCULAR; INTRAVENOUS at 07:50

## 2023-12-09 RX ADMIN — DOCUSATE SODIUM 50 MG AND SENNOSIDES 8.6 MG 2 TABLET: 8.6; 5 TABLET, FILM COATED ORAL at 05:37

## 2023-12-09 RX ADMIN — MAGNESIUM SULFATE HEPTAHYDRATE 1 G: 1 INJECTION, SOLUTION INTRAVENOUS at 05:45

## 2023-12-09 ASSESSMENT — ENCOUNTER SYMPTOMS
ABDOMINAL PAIN: 0
FOCAL WEAKNESS: 0
HEADACHES: 0
PALPITATIONS: 0
FEVER: 0
CHILLS: 0
SHORTNESS OF BREATH: 0
EYES NEGATIVE: 1
SORE THROAT: 0
SPUTUM PRODUCTION: 0
MUSCULOSKELETAL NEGATIVE: 1
NAUSEA: 0
VOMITING: 0

## 2023-12-09 ASSESSMENT — PAIN DESCRIPTION - PAIN TYPE
TYPE: ACUTE PAIN
TYPE: SURGICAL PAIN
TYPE: ACUTE PAIN
TYPE: ACUTE PAIN
TYPE: SURGICAL PAIN
TYPE: ACUTE PAIN
TYPE: ACUTE PAIN
TYPE: SURGICAL PAIN
TYPE: ACUTE PAIN

## 2023-12-09 ASSESSMENT — FIBROSIS 4 INDEX: FIB4 SCORE: 0.74

## 2023-12-09 NOTE — CARE PLAN
The patient is Stable - Low risk of patient condition declining or worsening    Shift Goals  Clinical Goals: Hemodynamic Stability, Respiratory Stability  Patient Goals: VIDA  Family Goals: Comfort    Progress made toward(s) clinical / shift goals:  Extubation and Hemodynamic Stability.    Patient is not progressing towards the following goals:N/A      Problem: Day of surgery post CABG/Heart valve replacement  Goal: Stabilization in immediate post op period  Intervention: VS q 15 min x 4 hours, then q 1 hour. Include temperature immediately upon arrival. Check CO/CI q 2-4 hours and PRN  Note: Completed per protocol.  Intervention: First post op hour labs and EKG per order  Note: Completed per protocol.  Intervention: Serum K q 6 hours x 24 hours.  ABG and CBC prn.  Note: Completed per protocol. No K replacement required.  Intervention: Initiate post cardiac insulin infusion protocol orders for FSBS greater than 140 and check frequency per protocol  Note: Patient on insulin gtt with BG checks q hr per protocol.   Intervention: FSBS frequency as per Cardiac Surgery Insulin Drip Protocol  Note: Checked per protocol.  Intervention: For patients on Beta Blockers: verify dose given prior to surgery or within 6 hours after arrival to the unit  Note: 12.5mg Metoprolol dose given at 0602 this am.  Intervention: Chest tube to 20 cm suction, record CT drainage with VS, and check for air leak  Note: Chest tubes placed on suction upon arrival to the unit, and drainage checked per protocol.  Intervention: Titrate and wean off vasoactive drips per patient's condition and per MD order while maintaining SBP  mmHg per MD order  Note: Patient currently on .01 mcg/kg/min Levophed.   Intervention: Wean from Vent per protocol (see protocol), extubation goal within 6 hours post op  Note: Patient extubated at 1612 to 6L NC.   Intervention: Dangle within 4 hours post extubation  Note: Patient to dangle at side of bed at 2012.

## 2023-12-09 NOTE — CARE PLAN
Problem: Hyperinflation  Goal: Prevent or improve atelectasis  Description: Target End Date:  3 to 4 days    1. Instruct incentive spirometry usage  2.  Perform hyperinflation therapy as indicated  Outcome: Not Met       Respiratory Update    Treatment modality: PEP (750)  Frequency: QID    Pt tolerating current treatments well with no adverse reactions.

## 2023-12-09 NOTE — PROGRESS NOTES
Extubation:    RSBI 30, PINSP 5, RR 20, PEEP 8, FiO2 50%, NIF -25, VC 1232, pH 7.318, PaCO2 42, SPO2 95%    Extubated @ 1612 to 6L NC

## 2023-12-09 NOTE — PROGRESS NOTES
Critical Care Progress Note    Date of admission  12/5/2023    Chief Complaint  67 y.o. male with a history of HTN, DEREK, asthma and obesity was admitted to the hospital with an NSTEMI on 12/5.  He underwent cardiac catheterization on 12/6 which revealed severe multivessel coronary artery disease with an LVEDP of 25 m of mercury and normal LVEF.  He was referred for CABG which he underwent on Friday 12/8 with Dr. Davalos.     Hospital Course  12/5 - Admitted to the hospital  ...  12/8 - CABG, admitted to ICU  12/9 - Extubated yesterday.  Doing well.  CCM to sign off    Interval Problem Update  Reviewed last 24 hour events:  Tmax: Afebrile  Diet: Advance per CTS  Vasopressors: None    Infusions: None     Antibx: None    Intake / Output  Urine Output past 24 hours: 1.9 L    Lab Trends  WBC 18    BMP WNL    Review of Systems  Review of Systems   Constitutional:  Negative for chills, fever and malaise/fatigue.   HENT:  Negative for congestion and sore throat.    Eyes: Negative.    Respiratory:  Negative for sputum production and shortness of breath.    Cardiovascular:  Positive for chest pain (With coughing). Negative for palpitations.   Gastrointestinal:  Negative for abdominal pain, nausea and vomiting.   Genitourinary: Negative.    Musculoskeletal: Negative.    Skin: Negative.    Neurological:  Negative for focal weakness and headaches.   All other systems reviewed and are negative.       Vital Signs for last 24 hours   Temp:  [36.4 °C (97.5 °F)-37.1 °C (98.8 °F)] 37.1 °C (98.8 °F)  Pulse:  [68-86] 76  Resp:  [15-30] 21  BP: ()/(54-75) 115/57  SpO2:  [88 %-100 %] 95 %    Hemodynamic parameters for last 24 hours  CVP:  [0 MM HG-24 MM HG] 7 MM HG    Respiratory Information for the last 24 hours  Vent Mode: Spont  PEEP/CPAP: 8  P Support: 5  MAP: 10  Control VTE (exp VT): 520    Physical Exam   Physical Exam  Vitals and nursing note reviewed. Exam conducted with a chaperone present.   Constitutional:       General:  He is awake. He is not in acute distress.  HENT:      Head: Normocephalic.      Mouth/Throat:      Mouth: Mucous membranes are moist.   Eyes:      Extraocular Movements: Extraocular movements intact.   Cardiovascular:      Rate and Rhythm: Normal rate and regular rhythm.      Pulses: Normal pulses.      Comments: Mediastinal tubes in place, trace drainage  Pulmonary:      Effort: Pulmonary effort is normal. No respiratory distress.   Abdominal:      General: There is no distension.      Palpations: Abdomen is soft.      Tenderness: There is no abdominal tenderness.   Musculoskeletal:         General: Normal range of motion.      Cervical back: Normal range of motion and neck supple.   Skin:     General: Skin is warm.      Capillary Refill: Capillary refill takes less than 2 seconds.   Neurological:      General: No focal deficit present.      Mental Status: He is alert.         Medications  Current Facility-Administered Medications   Medication Dose Route Frequency Provider Last Rate Last Admin    amLODIPine (Norvasc) tablet 5 mg  5 mg Oral Q DAY Lashanda Wasserman A.P.N.   5 mg at 12/09/23 0834    hydrALAZINE (Apresoline) injection 20 mg  20 mg Intravenous Q4HRS PRN Lashanda Wasserman A.P.N.        insulin regular (HumuLIN R,NovoLIN R) injection  2-9 Units Subcutaneous 4X/DAY ACHS Lashanda Wasserman A.P.N.        And    dextrose 10 % BOLUS 25 g  25 g Intravenous Q15 MIN PRN Lashanda Wasserman A.P.N.        Respiratory Therapy Consult   Nebulization Continuous RT Lashanda Wasserman A.P.N.        NS infusion   Intravenous Continuous Lashanda Wasserman A.P.N. 10 mL/hr at 12/08/23 1316 New Bag at 12/08/23 1316    NS infusion   Intravenous Continuous ROGER Tejeda.P.N. 30 mL/hr at 12/08/23 2037 Restarted at 12/08/23 2037    electrolyte-A (Plasmalyte-A) infusion   Intravenous PRN Lashanda Wasserman A.P.N. 999 mL/hr at 12/08/23 1536 New Bag at 12/08/23 1536    enoxaparin (Lovenox) inj 40 mg  40 mg Subcutaneous DAILY AT 1800 Lashanda  LARRY Wasserman A.P.N.        Nozin nasal  swab  1 Applicator Each Nostril BID Lashanda Wasserman A.P.N.   1 Applicator at 12/09/23 0834    calcium CHLORIDE 10 % 1,000 mg in  mL IVPB  1,000 mg Intravenous Once PRN Lashanda Wasserman, A.P.N.        magnesium sulfate in D5W IVPB premix 1 g  1 g Intravenous DAILY Lashanda Wasserman A.P.N. 100 mL/hr at 12/09/23 0545 1 g at 12/09/23 0545    K+ Scale: Goal of 4.5  1 Each Intravenous Q6HRS Lashanda Wasserman A.P.N.   1 Each at 12/08/23 2348    clevidipine (Cleviprex) IV emulsion  0-21 mg/hr Intravenous Continuous Lashanda Wasserman A.P.N. 8 mL/hr at 12/09/23 0840 4 mg/hr at 12/09/23 0840    acetaminophen (Tylenol) tablet 1,000 mg  1,000 mg Oral Q6HRS Lashanda Wasserman, A.P.N.   1,000 mg at 12/09/23 0537    Followed by    [START ON 12/18/2023] acetaminophen (Tylenol) tablet 1,000 mg  1,000 mg Oral Q6HRS PRN Lashanda Wasserman, A.P.N.        oxyCODONE immediate-release (Roxicodone) tablet 5 mg  5 mg Oral Q3HRS PRN Lashanda Wasserman, A.P.N.        Or    oxyCODONE immediate release (Roxicodone) tablet 10 mg  10 mg Oral Q3HRS PRN Lashanda Wasserman, A.P.N.   10 mg at 12/09/23 1028    Or    fentaNYL (Sublimaze) injection 50 mcg  50 mcg Intravenous Q3HRS PRN Lashanda Wasserman, A.P.N.   50 mcg at 12/09/23 0750    traMADol (Ultram) 50 MG tablet 50 mg  50 mg Oral Q4HRS PRN Lashanda Wasserman, A.P.N.   50 mg at 12/08/23 1918    ondansetron (Zofran) syringe/vial injection 8 mg  8 mg Intravenous Q6HRS PRN ROGER Tejeda.P.N.        Or    prochlorperazine (Compazine) injection 10 mg  10 mg Intravenous Q6HRS PRN ROGER Tejeda.P.N.        senna-docusate (Pericolace Or Senokot S) 8.6-50 MG per tablet 2 Tablet  2 Tablet Oral BID ROGER Tejeda.P.N.   2 Tablet at 12/09/23 0537    And    polyethylene glycol/lytes (Miralax) Packet 1 Packet  1 Packet Oral DAILY ROGER Tejeda.P.N.   1 Packet at 12/09/23 0537    And    [START ON 12/10/2023] magnesium hydroxide (Milk Of Magnesia) suspension 30 mL   30 mL Oral DAILY Lashanda Wasserman A.P.N.        And    bisacodyl (Dulcolax) suppository 10 mg  10 mg Rectal QDAY PRN Lashanda Wasserman A.P.N.        omeprazole (PriLOSEC) capsule 20 mg  20 mg Oral DAILY Lashanda Wasserman A.P.N.   20 mg at 12/09/23 0537    mag hydrox-al hydrox-simeth (Maalox Plus Es Or Mylanta Ds) suspension 30 mL  30 mL Oral Q4HRS PRN Lashanda Wasserman A.P.N.        diphenhydrAMINE (Benadryl) tablet/capsule 25 mg  25 mg Oral HS PRN - MR X 1 Lashanda Wasserman A.P.N.        aspirin EC tablet 81 mg  81 mg Oral DAILY Lashanda Wasserman A.P.N.   81 mg at 12/09/23 0538    clopidogrel (Plavix) tablet 75 mg  75 mg Oral DAILY Lashanda Wasserman A.P.N.   75 mg at 12/09/23 0538    metoprolol tartrate (Lopressor) tablet 12.5 mg  12.5 mg Oral BID Lashanda Wasserman A.P.N.   12.5 mg at 12/09/23 0538    Followed by    [START ON 12/10/2023] metoprolol tartrate (Lopressor) tablet 25 mg  25 mg Oral BID Lashanda Wasserman A.P.N.        insulin lispro (HumaLOG,AdmeLOG) injection  0-14 Units Subcutaneous TID AC Carolyn Davalos M.D.        insulin regular (Humulin R) 100 Units in  mL Infusion  0-29 Units/hr Intravenous Continuous Carolyn Davalos M.D. 0 mL/hr at 12/09/23 0200 0 Units/hr at 12/09/23 0200    atorvastatin (Lipitor) tablet 80 mg  80 mg Oral Q EVENING Jn Alonzo M.D.   80 mg at 12/08/23 1718    citalopram (CeleXA) tablet 30 mg  30 mg Oral DAILY Jn Alonzo M.D.   30 mg at 12/09/23 0538    fluticasone (Flovent HFA) 110 MCG/ACT inhaler 110 mcg  1 Puff Inhalation BID (RT) Jn Alonzo M.D.   110 mcg at 12/09/23 0740       Fluids    Intake/Output Summary (Last 24 hours) at 12/9/2023 1054  Last data filed at 12/9/2023 0600  Gross per 24 hour   Intake 5709.67 ml   Output 1876 ml   Net 3833.67 ml       Laboratory  Recent Labs     12/08/23  1302 12/08/23  1407 12/08/23  1521   ISTATAPH 7.320* 7.308* 7.318*   ISTATAPCO2 45.9* 42.2* 42.2*   ISTATAPO2 86 75 89*   ISTATATCO2 25 22 23   DFNTFHH5ZRT 96 93 96    ISTATARTHCO3 23.7 21.1 21.6   ISTATARTBE -3 -5* -4   ISTATTEMP 36.4 C 36.1 C 36.4 C   ISTATFIO2 100  --  100   ISTATSPEC Arterial Arterial Arterial   ISTATAPHTC 7.328* 7.320* 7.326*   [id number] 83 70 86         Recent Labs     12/07/23  1259 12/08/23  0440 12/08/23  1300 12/08/23  1700 12/08/23  2306 12/09/23  0203 12/09/23  0551   SODIUM 134* 136  --   --   --  136  --    POTASSIUM 4.4 4.4 4.6   < > 4.2 4.4 4.5   CHLORIDE 104 105  --   --   --  103  --    CO2 20 23  --   --   --  21  --    BUN 13 16  --   --   --  15  --    CREATININE 0.92 1.09  --   --   --  0.84  --    MAGNESIUM  --   --  2.8*  --   --   --   --    CALCIUM 9.3 9.0  --   --   --  7.5*  --     < > = values in this interval not displayed.     Recent Labs     12/07/23  1259 12/08/23  0440 12/09/23  0203   ALTSGPT 15  --   --    ASTSGOT 11*  --   --    ALKPHOSPHAT 66  --   --    TBILIRUBIN 0.3  --   --    GLUCOSE 113* 106* 120*     Recent Labs     12/07/23  0202 12/07/23  1259 12/09/23  0203   WBC 8.9 9.1 18.4*   NEUTSPOLYS  --  58.70  --    LYMPHOCYTES  --  30.00  --    MONOCYTES  --  8.90  --    EOSINOPHILS  --  1.30  --    BASOPHILS  --  0.80  --    ASTSGOT  --  11*  --    ALTSGPT  --  15  --    ALKPHOSPHAT  --  66  --    TBILIRUBIN  --  0.3  --      Recent Labs     12/07/23  0202 12/07/23  1205 12/07/23  1259 12/08/23  1300 12/09/23  0203   RBC 5.81  --  6.05  --  4.78   HEMOGLOBIN 17.6  --  18.7* 14.7 14.7   HEMATOCRIT 53.1*  --  55.6* 45.0 44.4   PLATELETCT 329  --  343 220 256   PROTHROMBTM  --  12.9  --  15.8*  --    APTT  --  68.9*  --  28.8  --    INR  --  0.97  --  1.24*  --        Imaging  X-Ray:  I have personally reviewed the images and compared with prior images. and My impression is: Normal CXR, mediastinal drains in place    Assessment/Plan  * S/P CABG x 3  Assessment & Plan  Procedure: CABG x3  LIMA-LAD, SVG-OM3, SVG-RCA  Date of surgery: 12/08/23   Surgeon: Anoop           Monitor hemodynamics and titrate pressor and inotropic  support.   Follow chest tube output and correct coagulopathy.     Monitor for post cardiac surgery complication such as myocardial dysfunction, bleeding, tamponade, graft dysfunction, arrhythmia, respiratory failure, neurologic, renal and infectious complication.      Monitor and treat stress hyperglycemia.     Extubated 12/8    Coronary artery disease  Assessment & Plan  LAD with 95% mid stenosis, OM 2 with 95% stenosis, RCA with 70% mid stenosis and long segment of 80 to-90% distal stenosis    Now s/p CABG    Aspirin  Statin    On mechanically assisted ventilation (HCC)  Assessment & Plan  Intubated date: 12/8  Extubated 12/8    NSTEMI (non-ST elevated myocardial infarction) ([facility])- (present on admission)  Assessment & Plan  S/p cardiac catheterization with severe triple-vessel disease  Cardiac surgery for CABG on 12/8    BMI 38- (present on admission)  Assessment & Plan  Dietary  Lifestyle modifications    DEREK- (present on admission)  Assessment & Plan  CPAP nightly once extubated    Essential hypertension- (present on admission)  Assessment & Plan  Resume home meds when able    Dyslipidemia- (present on admission)  Assessment & Plan  Statin    Mild intermittent asthma without complication- (present on admission)  Assessment & Plan  PRN inhalers, not in acute exacerbation         VTE:  Per cardiac surgery  Ulcer: PPI  Lines: Central Line  Ongoing indication addressed, Arterial Line  Ongoing indication addressed and Beatty Catheter  Ongoing indication addressed      I have performed a physical exam and reviewed and updated ROS and Plan today (12/9/2023). In review of yesterday's note (12/8/2023), there are no changes except as documented above.     Discussed patient condition and risk of morbidity and/or mortality with Family, RN, RT, Pharmacy, , Charge nurse / hot rounds, Patient and [facility]      Ok to transfer patient out of ICU today. [facility] will sign off at transfer. Please call with  questions.

## 2023-12-09 NOTE — HOSPITAL COURSE
12/5 - Admitted to the hospital  ...  12/8 - CABG, admitted to ICU  12/9 - Extubated yesterday.  Doing well.  CCM to sign off

## 2023-12-09 NOTE — PROGRESS NOTES
Monitor Summary  Borderline first degree block  ST elevation in V2 and V3 leads, present before surgery  SR 70-82   R PVCs  0.20/0.10/0.39

## 2023-12-09 NOTE — PROGRESS NOTES
Cardiovascular Surgery Progress Note    Name: Edwin Hammond  MRN: [id number]  : 1955  Admit Date: 2023 11:48 PM  1 Day Post-Op     Procedure:  Procedure(s) and Anesthesia Type:     * CABG X 3, WITH ENDOSCOPIC VEIN PROCUREMENT - General     * ECHOCARDIOGRAM, TRANSESOPHAGEAL, INTRAOPERATIVE - General    Vitals:  Vitals:    23 0603 23 0615 23 0630 23 0645   BP: 111/56 122/61 118/56 115/57   Pulse: 78 78 82 79   Resp: (!) 25 18 (!)  17   Temp:       TempSrc:       SpO2: 99% 91% 92% 93%   Weight:       Height:          Temp (24hrs), Av.7 °C (98 °F), Min:36.4 °C (97.5 °F), Max:37.1 °C (98.8 °F)      Respiratory:  Vent Mode: Spont, PEEP/CPAP: 8, PIP: 15, MAP: 10 Respiration: 17, Pulse Oximetry: 93 %       Fluids:    Intake/Output Summary (Last 24 hours) at 2023 0802  Last data filed at 2023 0600  Gross per 24 hour   Intake 5959.67 ml   Output 2036 ml   Net 3923.67 ml     Admit weight: Weight: 114 kg (250 lb 14.1 oz)  Current weight: Weight: 117 kg (257 lb 4.4 oz) (23 0515)    Labs:  Recent Labs     23  0202 23  1259 23  1300 23  0203   WBC 8.9 9.1  --  18.4*   RBC 5.81 6.05  --  4.78   HEMOGLOBIN 17.6 18.7* 14.7 14.7   HEMATOCRIT 53.1* 55.6* 45.0 44.4   MCV 91.4 91.9  --  92.9   MCH 30.3 30.9  --  30.8   MCHC 33.1 33.6  --  33.1   RDW 44.9 45.3  --  45.4   PLATELETCT 329 343 220 256   MPV 9.1 9.0  --  9.4     Recent Labs     23  1259 23  0440 23  1300 23  2306 23  0203 23  0551   SODIUM 134* 136  --   --  136  --    POTASSIUM 4.4 4.4   < > 4.2 4.4 4.5   CHLORIDE 104 105  --   --  103  --    CO2 20 23  --   --  21  --    GLUCOSE 113* 106*  --   --  120*  --    BUN 13 16  --   --  15  --    CREATININE 0.92 1.09  --   --  0.84  --    CALCIUM 9.3 9.0  --   --  7.5*  --     < > = values in this interval not displayed.     Recent Labs     23  1205 23  1300   APTT 68.9* 28.8   INR 0.97 1.24*        HgbA1c:  5.6    Diabetic Educator Consult:  N\A    Medications:  Scheduled Medications   Medication Dose Frequency    enoxaparin (LOVENOX) injection  40 mg DAILY AT 1800    Nozin nasal  swab  1 Applicator BID    magnesium sulfate  1 g DAILY    K+ Scale: Goal of 4.5  1 Each Q6HRS    acetaminophen  1,000 mg Q6HRS    senna-docusate  2 Tablet BID    And    polyethylene glycol/lytes  1 Packet DAILY    And    [START ON 12/10/2023] magnesium hydroxide  30 mL DAILY    omeprazole  20 mg DAILY    aspirin  81 mg DAILY    clopidogrel  75 mg DAILY    metoprolol tartrate  12.5 mg BID    Followed by    [START ON 12/10/2023] metoprolol tartrate  25 mg BID    insulin regular  0-14 Units Once    insulin lispro  0-14 Units TID AC    atorvastatin  80 mg Q EVENING    citalopram  30 mg DAILY    fluticasone  1 Puff BID (RT)        Exam:   Physical Exam  Constitutional:       General: He is not in acute distress.     Appearance: He is well-developed. He is not diaphoretic.   Cardiovascular:      Rate and Rhythm: Normal rate and regular rhythm.      Heart sounds: Normal heart sounds. No murmur heard.     No friction rub. No gallop.   Pulmonary:      Effort: Pulmonary effort is normal. No respiratory distress.      Breath sounds: Examination of the right-lower field reveals decreased breath sounds. Examination of the left-lower field reveals decreased breath sounds. Decreased breath sounds present. No wheezing or rales.   Abdominal:      General: There is no distension.      Palpations: Abdomen is soft.      Tenderness: There is no abdominal tenderness.   Musculoskeletal:      Cervical back: Neck supple.   Skin:     General: Skin is warm and dry.      Comments: Sternum- prevena  SVG site- ace wrap   Neurological:      Mental Status: He is alert and oriented to person, place, and time.         Cardiac Medications:    ASA - Yes    Plavix - Yes    Post-operative Beta Blockers - Yes    Ace/ARB- No; contraindicated because of  Normal EF    Statin - Yes    Aldactone- No; contraindicated because of Normal EF    SGLT2i-  No contraindicated because of No; contraindicated because of Normal EF    Ejection Fraction:  55%    Telemetry:   12/9 SR    Assessment/Plan:  POD 1 HTN- on cleviprex, SR, keep mediastinal tubes, keep carrion, gently diurese    Disposition:  TBD

## 2023-12-09 NOTE — CARE PLAN
The patient is Watcher - Medium risk of patient condition declining or worsening    Shift Goals  Clinical Goals: SBP < 120, pain control, adequate urine output  Patient Goals: control pain  Family Goals: VIDA    Progress made toward(s) clinical / shift goals:  maintained SBP goal with low dose cleviprex, urine output adequate    Patient is not progressing towards the following goals: NA      Problem: Hemodynamics  Goal: Patient's hemodynamics, fluid balance and neurologic status will be stable or improve  Outcome: Progressing  Note: Required low dose cleviprex overnight to maintain SBP < 120, arterial line positional and dampened and unreliable, cuff ran Q30 minutes to verify arterial line, multiple power flushes given to arterial line overnight     Problem: Day of surgery post CABG/Heart valve replacement  Goal: Stabilization in immediate post op period  Outcome: Progressing  Intervention: VS q 15 min x 4 hours, then q 1 hour. Include temperature immediately upon arrival. Check CO/CI q 2-4 hours and PRN  Note: VS per policy, arterial line in place, no swan sushila line.   Intervention: If radial artery used, elevate arm, no BP checks or needle sticks from affected arm, monitor ulnar pulse and capillary refill  Note: Radial artery not utilized  Intervention: First post op hour labs and EKG per order  Note: Completed, labs stable  Intervention: Serum K q 6 hours x 24 hours.  ABG and CBC prn.  Note: Potassium replaced per K scale.   Intervention: Initiate post cardiac insulin infusion protocol orders for FSBS greater than 140 and check frequency per protocol  Note: Blood glucose  every hour, insulin infusion turned off 12/8 2330 and remained off since  Intervention: FSBS frequency as per Cardiac Surgery Insulin Drip Protocol  Note: Per protocol  Intervention: For patients on Beta Blockers: verify dose given prior to surgery or within 6 hours after arrival to the unit  Note: 12.5 mg metoprolol given before  surgery  Intervention: Chest tube to 20 cm suction, record CT drainage with VS, and check for air leak  Note: For night shift chest tube drainage was checked every hour, there was an intermittent low air leak (column 1 on pleurvac) on the mediastinal tube throughout the night  Intervention: For CT drainage >300 mL in first hour post op and/or 150 mL in subsequent hours: Stat platelets, PT, INR, TEG, iSTAT, and H&H per order  Note: NA  Intervention: Titrate and wean off vasoactive drips per patient's condition and per MD order while maintaining SBP  mmHg per MD order  Note: No vasopressors running overnight  Intervention: VAP protocol in place  Note: Patient refused oral care before bed  Intervention: Wean from Vent per protocol (see protocol), extubation goal within 6 hours post op  Note: Extubated 12/8 1612 to 6L NC  Intervention: IS q 1 hour while awake post extubation  Note: Best 750  Intervention: Bedrest until extubated and groin lines out  Note: NA  Intervention: Dangle within 4 hours post extubation  Note: Patient sat edge of bed for several minutes and stood to position himself higher in bed  Intervention: Up in chair 4 hours, day of extubation  Note: patient mobilized to the chair this morning  Intervention: Maintain all original surgical dressings per provider orders and specifications  Note: Prevena wound vac on mid-sternal incision, ACE wrap on right leg will remain in place until 12/9 1200  Intervention: Clear liquids post extubation, order carbohydrate free (post cardiac surgery) diet, advance as tolerated  Note: Tolerated clear liquids overnight, no nausea/vomiting  Intervention: Discontinue Calvin sushila and arterial line 12-18 hours post op if hemodynamically stable and off vasoactive drips  Note: Calvin sushila not in place, arterial line removed in the morning  Intervention: A-Fib and DVT prophylaxis per MD order or contraindications documented (refer to DVT/VTE problem on Care Plan)  Note: No  episodes of afib; SCD worn on left leg overnight, TEDs placed in the morning  Intervention: Amiodarone protocol per MD order  Note: NA

## 2023-12-10 LAB
ANION GAP SERPL CALC-SCNC: 8 MMOL/L (ref 7–16)
BUN SERPL-MCNC: 18 MG/DL (ref 8–22)
CALCIUM SERPL-MCNC: 7.6 MG/DL (ref 8.5–10.5)
CHLORIDE SERPL-SCNC: 101 MMOL/L (ref 96–112)
CO2 SERPL-SCNC: 25 MMOL/L (ref 20–33)
CREAT SERPL-MCNC: 1.11 MG/DL (ref 0.5–1.4)
ERYTHROCYTE [DISTWIDTH] IN BLOOD BY AUTOMATED COUNT: 45.9 FL (ref 35.9–50)
GFR SERPLBLD CREATININE-BSD FMLA CKD-EPI: 72 ML/MIN/1.73 M 2
GLUCOSE BLD STRIP.AUTO-MCNC: 110 MG/DL (ref 65–99)
GLUCOSE BLD STRIP.AUTO-MCNC: 98 MG/DL (ref 65–99)
GLUCOSE SERPL-MCNC: 117 MG/DL (ref 65–99)
HCT VFR BLD AUTO: 42.1 % (ref 42–52)
HGB BLD-MCNC: 13.7 G/DL (ref 14–18)
MCH RBC QN AUTO: 30.2 PG (ref 27–33)
MCHC RBC AUTO-ENTMCNC: 32.5 G/DL (ref 32.3–36.5)
MCV RBC AUTO: 92.9 FL (ref 81.4–97.8)
PLATELET # BLD AUTO: 235 K/UL (ref 164–446)
PMV BLD AUTO: 9.2 FL (ref 9–12.9)
POTASSIUM SERPL-SCNC: 4.2 MMOL/L (ref 3.6–5.5)
RBC # BLD AUTO: 4.53 M/UL (ref 4.7–6.1)
SODIUM SERPL-SCNC: 134 MMOL/L (ref 135–145)
WBC # BLD AUTO: 15.1 K/UL (ref 4.8–10.8)

## 2023-12-10 PROCEDURE — 94669 MECHANICAL CHEST WALL OSCILL: CPT

## 2023-12-10 PROCEDURE — 700102 HCHG RX REV CODE 250 W/ 637 OVERRIDE(OP): Performed by: THORACIC SURGERY (CARDIOTHORACIC VASCULAR SURGERY)

## 2023-12-10 PROCEDURE — 700111 HCHG RX REV CODE 636 W/ 250 OVERRIDE (IP): Performed by: NURSE PRACTITIONER

## 2023-12-10 PROCEDURE — A9270 NON-COVERED ITEM OR SERVICE: HCPCS | Performed by: INTERNAL MEDICINE

## 2023-12-10 PROCEDURE — A9270 NON-COVERED ITEM OR SERVICE: HCPCS | Performed by: THORACIC SURGERY (CARDIOTHORACIC VASCULAR SURGERY)

## 2023-12-10 PROCEDURE — 80048 BASIC METABOLIC PNL TOTAL CA: CPT

## 2023-12-10 PROCEDURE — 85027 COMPLETE CBC AUTOMATED: CPT

## 2023-12-10 PROCEDURE — 700102 HCHG RX REV CODE 250 W/ 637 OVERRIDE(OP): Performed by: INTERNAL MEDICINE

## 2023-12-10 PROCEDURE — 700102 HCHG RX REV CODE 250 W/ 637 OVERRIDE(OP): Performed by: NURSE PRACTITIONER

## 2023-12-10 PROCEDURE — A9270 NON-COVERED ITEM OR SERVICE: HCPCS | Performed by: NURSE PRACTITIONER

## 2023-12-10 PROCEDURE — 82962 GLUCOSE BLOOD TEST: CPT

## 2023-12-10 PROCEDURE — 770020 HCHG ROOM/CARE - TELE (206)

## 2023-12-10 RX ORDER — FUROSEMIDE 10 MG/ML
40 INJECTION INTRAMUSCULAR; INTRAVENOUS DAILY
Status: DISCONTINUED | OUTPATIENT
Start: 2023-12-10 | End: 2023-12-13 | Stop reason: HOSPADM

## 2023-12-10 RX ORDER — POTASSIUM CHLORIDE 20 MEQ/1
20 TABLET, EXTENDED RELEASE ORAL DAILY
Status: DISCONTINUED | OUTPATIENT
Start: 2023-12-10 | End: 2023-12-13 | Stop reason: HOSPADM

## 2023-12-10 RX ORDER — FLUTICASONE PROPIONATE 110 UG/1
1 AEROSOL, METERED RESPIRATORY (INHALATION) EVERY 12 HOURS
Status: DISCONTINUED | OUTPATIENT
Start: 2023-12-10 | End: 2023-12-13 | Stop reason: HOSPADM

## 2023-12-10 RX ADMIN — POLYETHYLENE GLYCOL 3350 1 PACKET: 17 POWDER, FOR SOLUTION ORAL at 05:01

## 2023-12-10 RX ADMIN — ACETAMINOPHEN 1000 MG: 500 TABLET, FILM COATED ORAL at 23:38

## 2023-12-10 RX ADMIN — METOPROLOL TARTRATE 25 MG: 25 TABLET, FILM COATED ORAL at 17:50

## 2023-12-10 RX ADMIN — ASPIRIN 81 MG: 81 TABLET, COATED ORAL at 04:59

## 2023-12-10 RX ADMIN — METOPROLOL TARTRATE 25 MG: 25 TABLET, FILM COATED ORAL at 05:05

## 2023-12-10 RX ADMIN — DOCUSATE SODIUM 50 MG AND SENNOSIDES 8.6 MG 2 TABLET: 8.6; 5 TABLET, FILM COATED ORAL at 04:59

## 2023-12-10 RX ADMIN — OMEPRAZOLE 20 MG: 20 CAPSULE, DELAYED RELEASE ORAL at 04:59

## 2023-12-10 RX ADMIN — POTASSIUM CHLORIDE 20 MEQ: 1500 TABLET, EXTENDED RELEASE ORAL at 10:37

## 2023-12-10 RX ADMIN — ATORVASTATIN CALCIUM 80 MG: 80 TABLET, FILM COATED ORAL at 17:51

## 2023-12-10 RX ADMIN — OXYCODONE 5 MG: 5 TABLET ORAL at 20:39

## 2023-12-10 RX ADMIN — Medication 1 APPLICATOR: at 20:35

## 2023-12-10 RX ADMIN — AMLODIPINE BESYLATE 5 MG: 10 TABLET ORAL at 05:00

## 2023-12-10 RX ADMIN — OXYCODONE 5 MG: 5 TABLET ORAL at 08:10

## 2023-12-10 RX ADMIN — MAGNESIUM SULFATE HEPTAHYDRATE 1 G: 1 INJECTION, SOLUTION INTRAVENOUS at 05:09

## 2023-12-10 RX ADMIN — MAGNESIUM HYDROXIDE 30 ML: 1200 LIQUID ORAL at 05:05

## 2023-12-10 RX ADMIN — OXYCODONE HYDROCHLORIDE 10 MG: 10 TABLET ORAL at 04:41

## 2023-12-10 RX ADMIN — ACETAMINOPHEN 1000 MG: 500 TABLET, FILM COATED ORAL at 04:58

## 2023-12-10 RX ADMIN — DOCUSATE SODIUM 50 MG AND SENNOSIDES 8.6 MG 2 TABLET: 8.6; 5 TABLET, FILM COATED ORAL at 17:50

## 2023-12-10 RX ADMIN — OXYCODONE HYDROCHLORIDE 10 MG: 10 TABLET ORAL at 15:24

## 2023-12-10 RX ADMIN — FUROSEMIDE 40 MG: 10 INJECTION, SOLUTION INTRAMUSCULAR; INTRAVENOUS at 10:37

## 2023-12-10 RX ADMIN — ACETAMINOPHEN 1000 MG: 500 TABLET, FILM COATED ORAL at 17:50

## 2023-12-10 RX ADMIN — CITALOPRAM HYDROBROMIDE 30 MG: 20 TABLET ORAL at 05:00

## 2023-12-10 RX ADMIN — FLUTICASONE PROPIONATE 110 MCG: 110 AEROSOL, METERED RESPIRATORY (INHALATION) at 20:35

## 2023-12-10 RX ADMIN — ENOXAPARIN SODIUM 40 MG: 100 INJECTION SUBCUTANEOUS at 17:51

## 2023-12-10 RX ADMIN — ACETAMINOPHEN 1000 MG: 500 TABLET, FILM COATED ORAL at 12:42

## 2023-12-10 RX ADMIN — CLOPIDOGREL BISULFATE 75 MG: 75 TABLET ORAL at 04:59

## 2023-12-10 RX ADMIN — Medication 1 APPLICATOR: at 08:08

## 2023-12-10 RX ADMIN — FLUTICASONE PROPIONATE 110 MCG: 110 AEROSOL, METERED RESPIRATORY (INHALATION) at 08:08

## 2023-12-10 ASSESSMENT — PAIN DESCRIPTION - PAIN TYPE
TYPE: SURGICAL PAIN
TYPE: ACUTE PAIN
TYPE: SURGICAL PAIN
TYPE: ACUTE PAIN
TYPE: SURGICAL PAIN

## 2023-12-10 ASSESSMENT — FIBROSIS 4 INDEX: FIB4 SCORE: 0.81

## 2023-12-10 NOTE — PROGRESS NOTES
Mediastinal chest tubes removed by [facility] RN. Appropriate LDAs Opened. Site clean, all sutures removed from site, no oozing at this time

## 2023-12-10 NOTE — CARE PLAN
The patient is Stable - Low risk of patient condition declining or worsening    Shift Goals  Clinical Goals: Monitor oxygenation, increase mobility, IS  Patient Goals: Pain management  Family Goals: VIDA    Progress made toward(s) clinical / shift goals:   Problem: Respiratory  Goal: Patient will achieve/maintain optimum respiratory ventilation and gas exchange  Outcome: Progressing  Note: Pt transitioned to RA     Problem: Fall Risk  Goal: Patient will remain free from falls  Outcome: Progressing  Note: Bed alarm in place, treaded slipper socks in place.      Problem: Post op day 2 CABG/Heart Valve Replacement  Goal: Optimal care of the post op CABG/heart valve replacement post op day 2  Outcome: Progressing  Note: aJci Carrasquillo'jacinto, pacer wires removed. Educated on q1 IS use. Up to chair for meals.        Patient is not progressing towards the following goals:

## 2023-12-10 NOTE — CARE PLAN
The patient is Stable - Low risk of patient condition declining or worsening    Shift Goals  Clinical Goals: wean oxygen  Patient Goals: control pain  Family Goals: VIDA    Progress made toward(s) clinical / shift goals:  Currently on 1 lpm from 4      Problem: Respiratory  Goal: Patient will achieve/maintain optimum respiratory ventilation and gas exchange  Description: Target End Date:  Prior to discharge or change in level of care    Document on Assessment flowsheet    1.  Assess and monitor rate, rhythm, depth and effort of respiration  2.  Breath sounds assessed qshift and/or as needed  3.  Assess O2 saturation, administer/titrate oxygen as ordered  4.  Position patient for maximum ventilatory efficiency  5.  Turn, cough, and deep breath with splinting to improve effectiveness  6.  Collaborate with RT to administer medication/treatments per order  7.  Encourage use of incentive spirometer and encourage patient to cough after use and utilize splinting techniques if applicable  8.  Airway suctioning  9.  Monitor sputum production for changes in color, consistency and frequency  10. Perform frequent oral hygiene  11. Alternate physical activity with rest periods  Outcome: Progressing     Problem: Post Op Day 1 CABG/Heart Valve Replacement  Goal: Optimal care of the post op CABG/heart valve replacement Post Op Day 1  Outcome: Progressing  Intervention: Antibiotics are discontinued within 24 hours of anesthesia end time unless indication documented for continuation beyond 24 hours  Note: Antibiotic therapy has been completed  Intervention: Daily weights in the morning  Note: Stand up weight recorded each morning  Intervention: Up in chair for all meals  Note: Up for all meals  Intervention: Ambulate in am if stable. First ambulation 25 feet. Repeat x 3 as tolerated  Note: Ambulated in room at bedtime  Intervention: Discontinue carrion catheter unless documented reason for continuation  Note: Carrion catheter remains in  place for accurate assessment of diuresis  Intervention: Assess surgical dressing and check provider orders for potential removal  Note: Prevena maintained, right leg ACE removed to view small amount bleeding from proximal incision.  Dry gauze applied  Intervention: [facility] trained RN to remove chest tubes if ordered by provider  Note: Chest tubes to remain in place  Intervention: IS q 1 hour while awake and record best IS volume  Note: Pt performs while awake, Reaches 0946-9122 ml  Intervention: Knee high EVI hose, on during the day, off at night  Note: TEDs removed at bedtime  Intervention: Saline lock IV  Note: IV fluids disconnected  Intervention: After 24th hour post-anesthesia end time, transition patient to Cardiac Surgery SQ Insulin Protocol  Note: Insulin transition done, pt has not required insulin coverage  Intervention: If patient is CABG or on home beta-blocker, start/resume beta-blocker on POD 1 or POD 2 or document contraindication  Note: BBs started     Problem: Post op day 2 CABG/Heart Valve Replacement  Goal: Optimal care of the post op CABG/heart valve replacement post op day 2  Outcome: Progressing  Intervention: FSBS: when 2 consecutive BS < 130 after post op day 2, discontinue FSBS unless patient is insulin dependent diabetic  Note: Blood sugar consistently less than 120.  Insulin coverage discontinued  Intervention: Daily weights in the morning  Note: Stand up weight recorded = 113.4 kg  Intervention: Up in chair for all meals  Note: Up for breakfast     Problem: Pain - Standard  Goal: Alleviation of pain or a reduction in pain to the patient’s comfort goal  Description: Target End Date:  Prior to discharge or change in level of care    Document on Vitals flowsheet    1.  Document pain using the appropriate pain scale per order or unit policy  2.  Educate and implement non-pharmacologic comfort measures (i.e. relaxation, distraction, massage, cold/heat therapy, etc.)  3.  Pain management  medications as ordered  4.  Reassess pain after pain med administration per policy  5.  If opiods administered assess patient's response to pain medication is appropriate per POSS sedation scale  6.  Follow pain management plan developed in collaboration with patient and interdisciplinary team (including palliative care or pain specialists if applicable)  Outcome: Progressing  Note: Pain controlled with Oxycodone over night, Fentanyl for breakthrough pain not required

## 2023-12-10 NOTE — CARE PLAN
The patient is Stable - Low risk of patient condition declining or worsening    Shift Goals  Clinical Goals: SBP < 120, pain control, adequate urine output  Patient Goals: control pain  Family Goals: VIDA    Progress made toward(s) clinical / shift goals:    Problem: Post Op Day 1 CABG/Heart Valve Replacement  Goal: Optimal care of the post op CABG/heart valve replacement Post Op Day 1  Outcome: Progressing  Intervention: EKG and CXR completed  Note: complete  Intervention: All valve patients: PT/INR daily  Note: N/A  Intervention: Antibiotics are discontinued within 24 hours of anesthesia end time unless indication documented for continuation beyond 24 hours  Note: completed  Intervention: Daily weights in the morning  Note: completed  Intervention: Up in chair for all meals  Note: completed  Intervention: Ambulate in am if stable. First ambulation 25 feet. Repeat x 3 as tolerated  Note: Ambulated 25ft, pt tolerated continued ambulation to 500ft  Intervention: Discontinue carrion catheter unless documented reason for continuation  Note: Continued carrion use per CT  Intervention: Assess surgical dressing and check provider orders for potential removal  Note: Cont dressing  Intervention: Ensure referal to intensive cardiac rehab is ordered, and smoking cessation education if appropriate  Note: Prior to discharge  Intervention: [facility] trained RN to remove chest tubes if ordered by provider  Note: Cont chest tube  Intervention: IS q 1 hour while awake and record best IS volume  Note: Completed, best 1750  Intervention: Knee high EVI hose, on during the day, off at night  Note: EVI hose on for day shift  Intervention: Saline lock IV  Note: completed  Intervention: Transfer to [facility], begin VS q 4 hours  Note: N/A  Intervention: After 24th hour post-anesthesia end time, transition patient to Cardiac Surgery SQ Insulin Protocol  Note: completed  Intervention: If patient is CABG or on home beta-blocker, start/resume  beta-blocker on POD 1 or POD 2 or document contraindication  Note: completed     Problem: Pain - Standard  Goal: Alleviation of pain or a reduction in pain to the patient’s comfort goal  Description: Target End Date:  Prior to discharge or change in level of care    Document on Vitals flowsheet    1.  Document pain using the appropriate pain scale per order or unit policy  2.  Educate and implement non-pharmacologic comfort measures (i.e. relaxation, distraction, massage, cold/heat therapy, etc.)  3.  Pain management medications as ordered  4.  Reassess pain after pain med administration per policy  5.  If opiods administered assess patient's response to pain medication is appropriate per POSS sedation scale  6.  Follow pain management plan developed in collaboration with patient and interdisciplinary team (including palliative care or pain specialists if applicable)  Outcome: Progressing  Note: Pain monitored throughout shift with interventions as needed, see MAR

## 2023-12-10 NOTE — PROGRESS NOTES
4 Eyes Skin Assessment Completed by WESTON Dior and WESTON Sanford.    Head WDL  Ears WDL  Nose WDL  Mouth WDL  Neck WDL, central line in place  Breast/Chest Prevena wound vac to sternum, CT sites CDI  Shoulder Blades WDL  Spine WDL  (R) Arm/Elbow/Hand WDL  (L) Arm/Elbow/Hand WDL  Abdomen WDL  Groin WDL  Scrotum/Coccyx/Buttocks Redness and Blanching  (R) Leg EVH sites CDI, upper thigh bruising  (L) Leg WDL  (R) Heel/Foot/Toe WDL  (L) Heel/Foot/Toe WDL          Devices In Places Tele Box, Central Line, and EVI's      Interventions In Place Pillows    Possible Skin Injury No    Pictures Uploaded Into Epic N/A  Wound Consult Placed N/A  RN Wound Prevention Protocol Ordered No

## 2023-12-10 NOTE — CARE PLAN
Problem: Hyperinflation  Goal: Prevent or improve atelectasis  Description: Target End Date:  3 to 4 days    1. Instruct incentive spirometry usage  2.  Perform hyperinflation therapy as indicated  Outcome: Progressing       Respiratory Update    Treatment modality: PEP (1750)  Frequency: QID    Pt tolerating current treatments well with no adverse reactions.

## 2023-12-10 NOTE — PROGRESS NOTES
Cardiovascular Surgery Progress Note    Name: Edwin Hammond  MRN: [id number]  : 1955  Admit Date: 2023 11:48 PM  2 Days Post-Op     Procedure:  Procedure(s) and Anesthesia Type:     * CABG X 3, WITH ENDOSCOPIC VEIN PROCUREMENT - General     * ECHOCARDIOGRAM, TRANSESOPHAGEAL, INTRAOPERATIVE - General    Vitals:  Vitals:    12/10/23 0441 12/10/23 0500 12/10/23 0600 12/10/23 0640   BP: 120/71 121/68 129/72    Pulse: 84 84 89 88   Resp: (!) 24 15 18 18   Temp:       TempSrc:       SpO2: 96% 95% 92% 95%   Weight:   113 kg (250 lb)    Height:          Temp (24hrs), Av.9 °C (98.5 °F), Min:36.6 °C (97.9 °F), Max:37.2 °C (99 °F)      Respiratory:    Respiration: 18, Pulse Oximetry: 95 %       Fluids:    Intake/Output Summary (Last 24 hours) at 12/10/2023 0913  Last data filed at 12/10/2023 0800  Gross per 24 hour   Intake 1737.46 ml   Output 3195 ml   Net -1457.54 ml       Admit weight: Weight: 114 kg (250 lb 14.1 oz)  Current weight: Weight: 113 kg (250 lb) (12/10/23 0600)    Labs:  Recent Labs     23  1259 23  1300 23  0203 12/10/23  0435   WBC 9.1  --  18.4* 15.1*   RBC 6.05  --  4.78 4.53*   HEMOGLOBIN 18.7* 14.7 14.7 13.7*   HEMATOCRIT 55.6* 45.0 44.4 42.1   MCV 91.9  --  92.9 92.9   MCH 30.9  --  30.8 30.2   MCHC 33.6  --  33.1 32.5   RDW 45.3  --  45.4 45.9   PLATELETCT 343 220 256 235   MPV 9.0  --  9.4 9.2       Recent Labs     23  0440 23  1300 23  0203 23  0551 12/10/23  0435   SODIUM 136  --  136  --  134*   POTASSIUM 4.4   < > 4.4 4.5 4.2   CHLORIDE 105  --  103  --  101   CO2 23  --  21  --  25   GLUCOSE 106*  --  120*  --  117*   BUN 16  --  15  --  18   CREATININE 1.09  --  0.84  --  1.11   CALCIUM 9.0  --  7.5*  --  7.6*    < > = values in this interval not displayed.       Recent Labs     23  1205 23  1300   APTT 68.9* 28.8   INR 0.97 1.24*         HgbA1c:  5.6    Diabetic Educator Consult:  N\A    Medications:  Scheduled Medications    Medication Dose Frequency    potassium chloride SA  20 mEq DAILY    furosemide  40 mg DAILY    amLODIPine  5 mg Q DAY    enoxaparin (LOVENOX) injection  40 mg DAILY AT 1800    Nozin nasal  swab  1 Applicator BID    acetaminophen  1,000 mg Q6HRS    senna-docusate  2 Tablet BID    And    polyethylene glycol/lytes  1 Packet DAILY    And    magnesium hydroxide  30 mL DAILY    omeprazole  20 mg DAILY    aspirin  81 mg DAILY    clopidogrel  75 mg DAILY    metoprolol tartrate  25 mg BID    atorvastatin  80 mg Q EVENING    citalopram  30 mg DAILY    fluticasone  1 Puff BID (RT)        Exam:   Physical Exam  Constitutional:       General: He is not in acute distress.     Appearance: He is well-developed. He is not diaphoretic.   Cardiovascular:      Rate and Rhythm: Normal rate and regular rhythm.      Heart sounds: Normal heart sounds. No murmur heard.     No friction rub. No gallop.   Pulmonary:      Effort: Pulmonary effort is normal. No respiratory distress.      Breath sounds: Examination of the right-lower field reveals decreased breath sounds. Examination of the left-lower field reveals decreased breath sounds. Decreased breath sounds present. No wheezing or rales.   Abdominal:      General: There is no distension.      Palpations: Abdomen is soft.      Tenderness: There is no abdominal tenderness.   Musculoskeletal:      Cervical back: Neck supple.   Skin:     General: Skin is warm and dry.      Comments: Sternum- prevena  SVG site- cdi   Neurological:      Mental Status: He is alert and oriented to person, place, and time.       Cardiac Medications:    ASA - Yes    Plavix - Yes    Post-operative Beta Blockers - Yes    Ace/ARB- No; contraindicated because of Normal EF    Statin - Yes    Aldactone- No; contraindicated because of Normal EF    SGLT2i-  No contraindicated because of No; contraindicated because of Normal EF    Ejection Fraction:  55%    Telemetry:   12/9 SR  12/10 SR    Assessment/Plan:  POD 1  HTN- on cleviprex, SR, keep mediastinal tubes, keep carrion, gently diurese    POD 2 HDS, SR- d/c pacer wires, d/c mediastinal tubes, gently diurese, d/c carrion, incisions- c/d/I, ambulating, transfer to tele    Disposition:  TBD

## 2023-12-10 NOTE — CARE PLAN
Problem: Pain - Standard  Goal: Alleviation of pain or a reduction in pain to the patient’s comfort goal  Outcome: Progressing  Note: Oxycodone 10mg Q3hr and IV fentanyl inbetween Q3hr throughout night shift

## 2023-12-10 NOTE — PROGRESS NOTES
Received report from RN, pt transferred up to floor with tech. Pt ambulated to bed. Tele monitor connected, monitors notified. Vitals stable, pt A&Ox4, reports no pain at this time. Prevena in place to sternum, working appropriately. Pt with EVI hose on. Bed alarm on. Call light within reach.

## 2023-12-11 LAB
ANION GAP SERPL CALC-SCNC: 7 MMOL/L (ref 7–16)
BUN SERPL-MCNC: 14 MG/DL (ref 8–22)
CALCIUM SERPL-MCNC: 7.7 MG/DL (ref 8.5–10.5)
CHLORIDE SERPL-SCNC: 102 MMOL/L (ref 96–112)
CO2 SERPL-SCNC: 26 MMOL/L (ref 20–33)
CREAT SERPL-MCNC: 0.95 MG/DL (ref 0.5–1.4)
ERYTHROCYTE [DISTWIDTH] IN BLOOD BY AUTOMATED COUNT: 44.9 FL (ref 35.9–50)
GFR SERPLBLD CREATININE-BSD FMLA CKD-EPI: 87 ML/MIN/1.73 M 2
GLUCOSE SERPL-MCNC: 100 MG/DL (ref 65–99)
HCT VFR BLD AUTO: 40.2 % (ref 42–52)
HGB BLD-MCNC: 13.3 G/DL (ref 14–18)
LV EJECT FRACT  99904: 60
LV EJECT FRACT MOD 2C 99903: 63.75
LV EJECT FRACT MOD 4C 99902: 60.42
LV EJECT FRACT MOD BP 99901: 64.21
MAGNESIUM SERPL-MCNC: 2 MG/DL (ref 1.5–2.5)
MCH RBC QN AUTO: 30.3 PG (ref 27–33)
MCHC RBC AUTO-ENTMCNC: 33.1 G/DL (ref 32.3–36.5)
MCV RBC AUTO: 91.6 FL (ref 81.4–97.8)
PLATELET # BLD AUTO: 258 K/UL (ref 164–446)
PMV BLD AUTO: 9.2 FL (ref 9–12.9)
POTASSIUM SERPL-SCNC: 4 MMOL/L (ref 3.6–5.5)
POTASSIUM SERPL-SCNC: 4.1 MMOL/L (ref 3.6–5.5)
RBC # BLD AUTO: 4.39 M/UL (ref 4.7–6.1)
SODIUM SERPL-SCNC: 135 MMOL/L (ref 135–145)
WBC # BLD AUTO: 12.9 K/UL (ref 4.8–10.8)

## 2023-12-11 PROCEDURE — 97165 OT EVAL LOW COMPLEX 30 MIN: CPT

## 2023-12-11 PROCEDURE — 700102 HCHG RX REV CODE 250 W/ 637 OVERRIDE(OP): Performed by: NURSE PRACTITIONER

## 2023-12-11 PROCEDURE — 700111 HCHG RX REV CODE 636 W/ 250 OVERRIDE (IP): Performed by: NURSE PRACTITIONER

## 2023-12-11 PROCEDURE — 84132 ASSAY OF SERUM POTASSIUM: CPT

## 2023-12-11 PROCEDURE — 770020 HCHG ROOM/CARE - TELE (206)

## 2023-12-11 PROCEDURE — 85027 COMPLETE CBC AUTOMATED: CPT

## 2023-12-11 PROCEDURE — 94760 N-INVAS EAR/PLS OXIMETRY 1: CPT

## 2023-12-11 PROCEDURE — 97162 PT EVAL MOD COMPLEX 30 MIN: CPT

## 2023-12-11 PROCEDURE — 83735 ASSAY OF MAGNESIUM: CPT

## 2023-12-11 PROCEDURE — 700102 HCHG RX REV CODE 250 W/ 637 OVERRIDE(OP): Performed by: INTERNAL MEDICINE

## 2023-12-11 PROCEDURE — A9270 NON-COVERED ITEM OR SERVICE: HCPCS | Performed by: INTERNAL MEDICINE

## 2023-12-11 PROCEDURE — 97535 SELF CARE MNGMENT TRAINING: CPT

## 2023-12-11 PROCEDURE — 99024 POSTOP FOLLOW-UP VISIT: CPT | Performed by: NURSE PRACTITIONER

## 2023-12-11 PROCEDURE — A9270 NON-COVERED ITEM OR SERVICE: HCPCS | Performed by: NURSE PRACTITIONER

## 2023-12-11 PROCEDURE — 700111 HCHG RX REV CODE 636 W/ 250 OVERRIDE (IP): Mod: JZ | Performed by: NURSE PRACTITIONER

## 2023-12-11 PROCEDURE — 94669 MECHANICAL CHEST WALL OSCILL: CPT

## 2023-12-11 PROCEDURE — 80048 BASIC METABOLIC PNL TOTAL CA: CPT

## 2023-12-11 RX ORDER — AMIODARONE HYDROCHLORIDE 200 MG/1
400 TABLET ORAL 2 TIMES DAILY
Status: DISCONTINUED | OUTPATIENT
Start: 2023-12-12 | End: 2023-12-13 | Stop reason: HOSPADM

## 2023-12-11 RX ADMIN — ASPIRIN 81 MG: 81 TABLET, COATED ORAL at 05:56

## 2023-12-11 RX ADMIN — POTASSIUM CHLORIDE 20 MEQ: 1500 TABLET, EXTENDED RELEASE ORAL at 05:56

## 2023-12-11 RX ADMIN — ACETAMINOPHEN 1000 MG: 500 TABLET, FILM COATED ORAL at 18:30

## 2023-12-11 RX ADMIN — ATORVASTATIN CALCIUM 80 MG: 80 TABLET, FILM COATED ORAL at 18:30

## 2023-12-11 RX ADMIN — METOPROLOL TARTRATE 25 MG: 25 TABLET, FILM COATED ORAL at 05:56

## 2023-12-11 RX ADMIN — MAGNESIUM HYDROXIDE 30 ML: 1200 LIQUID ORAL at 05:56

## 2023-12-11 RX ADMIN — METOPROLOL TARTRATE 25 MG: 25 TABLET, FILM COATED ORAL at 18:30

## 2023-12-11 RX ADMIN — Medication 1 APPLICATOR: at 08:03

## 2023-12-11 RX ADMIN — DOCUSATE SODIUM 50 MG AND SENNOSIDES 8.6 MG 2 TABLET: 8.6; 5 TABLET, FILM COATED ORAL at 18:30

## 2023-12-11 RX ADMIN — ACETAMINOPHEN 1000 MG: 500 TABLET, FILM COATED ORAL at 23:51

## 2023-12-11 RX ADMIN — FLUTICASONE PROPIONATE 110 MCG: 110 AEROSOL, METERED RESPIRATORY (INHALATION) at 05:59

## 2023-12-11 RX ADMIN — AMIODARONE HYDROCHLORIDE 150 MG: 1.5 INJECTION, SOLUTION INTRAVENOUS at 18:49

## 2023-12-11 RX ADMIN — FLUTICASONE PROPIONATE 110 MCG: 110 AEROSOL, METERED RESPIRATORY (INHALATION) at 18:39

## 2023-12-11 RX ADMIN — CLOPIDOGREL BISULFATE 75 MG: 75 TABLET ORAL at 05:56

## 2023-12-11 RX ADMIN — POLYETHYLENE GLYCOL 3350 1 PACKET: 17 POWDER, FOR SOLUTION ORAL at 05:56

## 2023-12-11 RX ADMIN — ENOXAPARIN SODIUM 40 MG: 100 INJECTION SUBCUTANEOUS at 18:30

## 2023-12-11 RX ADMIN — OMEPRAZOLE 20 MG: 20 CAPSULE, DELAYED RELEASE ORAL at 05:56

## 2023-12-11 RX ADMIN — AMLODIPINE BESYLATE 5 MG: 10 TABLET ORAL at 05:56

## 2023-12-11 RX ADMIN — FUROSEMIDE 40 MG: 10 INJECTION, SOLUTION INTRAMUSCULAR; INTRAVENOUS at 05:56

## 2023-12-11 RX ADMIN — AMIODARONE HYDROCHLORIDE 1 MG/MIN: 1.8 INJECTION, SOLUTION INTRAVENOUS at 19:19

## 2023-12-11 RX ADMIN — ACETAMINOPHEN 1000 MG: 500 TABLET, FILM COATED ORAL at 05:56

## 2023-12-11 RX ADMIN — CITALOPRAM HYDROBROMIDE 30 MG: 20 TABLET ORAL at 05:56

## 2023-12-11 RX ADMIN — Medication 1 APPLICATOR: at 20:29

## 2023-12-11 RX ADMIN — DOCUSATE SODIUM 50 MG AND SENNOSIDES 8.6 MG 2 TABLET: 8.6; 5 TABLET, FILM COATED ORAL at 05:56

## 2023-12-11 RX ADMIN — ACETAMINOPHEN 1000 MG: 500 TABLET, FILM COATED ORAL at 11:16

## 2023-12-11 ASSESSMENT — COGNITIVE AND FUNCTIONAL STATUS - GENERAL
DAILY ACTIVITIY SCORE: 23
TURNING FROM BACK TO SIDE WHILE IN FLAT BAD: A LITTLE
MOVING TO AND FROM BED TO CHAIR: A LITTLE
SUGGESTED CMS G CODE MODIFIER MOBILITY: CK
MOBILITY SCORE: 18
STANDING UP FROM CHAIR USING ARMS: A LITTLE
DAILY ACTIVITIY SCORE: 24
SUGGESTED CMS G CODE MODIFIER DAILY ACTIVITY: CI
SUGGESTED CMS G CODE MODIFIER DAILY ACTIVITY: CH
HELP NEEDED FOR BATHING: A LITTLE
WALKING IN HOSPITAL ROOM: A LITTLE
MOVING FROM LYING ON BACK TO SITTING ON SIDE OF FLAT BED: A LITTLE
MOBILITY SCORE: 24
SUGGESTED CMS G CODE MODIFIER MOBILITY: CH
CLIMB 3 TO 5 STEPS WITH RAILING: A LITTLE

## 2023-12-11 ASSESSMENT — FIBROSIS 4 INDEX: FIB4 SCORE: 0.74

## 2023-12-11 ASSESSMENT — GAIT ASSESSMENTS
DISTANCE (FEET): 300
GAIT LEVEL OF ASSIST: SUPERVISED
DISTANCE (FEET): 20

## 2023-12-11 ASSESSMENT — PAIN DESCRIPTION - PAIN TYPE: TYPE: ACUTE PAIN

## 2023-12-11 ASSESSMENT — ACTIVITIES OF DAILY LIVING (ADL): TOILETING: INDEPENDENT

## 2023-12-11 NOTE — CARE PLAN
The patient is Watcher - Medium risk of patient condition declining or worsening    Shift Goals  Clinical Goals: Monitor oxygenation, improve IS, pain control  Patient Goals: Sleep  Family Goals: VIDA    Progress made toward(s) clinical / shift goals:      Problem: Knowledge Deficit - Standard  Goal: Patient and family/care givers will demonstrate understanding of plan of care, disease process/condition, diagnostic tests and medications  Outcome: Progressing  Note: Patient updated on plan of care. All questions answered. Patient verbalized understanding. No further questions at this time.       Problem: Fall Risk  Goal: Patient will remain free from falls  Outcome: Progressing  Note: Patient educated to utilize call light. Patient verbalized understanding. All fall precautions in place. Bed alarm is on.       Problem: Post Op Day 3 CABG/Heart Valve replacement  Goal: Optimal care of the post op CABG/Heart Valve replacement post op day 3  Outcome: Progressing  Intervention: Ambulate 4 times daily, increasing the distance each time  Note: PT ambulated 500 ft in PM and 1000 ft in AM  Intervention: IS q 1 hour while awake and record best IS volume  Note: Best IS 2250     Problem: Pain - Standard  Goal: Alleviation of pain or a reduction in pain to the patient’s comfort goal  Outcome: Progressing  Note: Pain rating scale and pain goals discussed with patient          Patient is not progressing towards the following goals:

## 2023-12-11 NOTE — PROGRESS NOTES
Cardiovascular Surgery Progress Note    Name: Edwin Hammond  MRN: [id number]  : 1955  Admit Date: 2023 11:48 PM  3 Days Post-Op     Procedure:  Procedure(s) and Anesthesia Type:     * CABG X 3, WITH ENDOSCOPIC VEIN PROCUREMENT - General     * ECHOCARDIOGRAM, TRANSESOPHAGEAL, INTRAOPERATIVE - General    Vitals:  Vitals:    23 0556 23 0610 23 0636 23 0710   BP: 135/77   128/78   Pulse: 89  80 78   Resp:   18 18   Temp:    36.1 °C (97 °F)   TempSrc:    Temporal   SpO2:   94% 95%   Weight:  115 kg (253 lb 4.9 oz)     Height:          Temp (24hrs), Av.6 °C (97.9 °F), Min:36.1 °C (97 °F), Max:36.8 °C (98.3 °F)      Respiratory:    Respiration: 18, Pulse Oximetry: 95 %       Fluids:    Intake/Output Summary (Last 24 hours) at 2023 0957  Last data filed at 2023 0800  Gross per 24 hour   Intake 1480 ml   Output 3800 ml   Net -2320 ml       Admit weight: Weight: 114 kg (250 lb 14.1 oz)  Current weight: Weight: 115 kg (253 lb 4.9 oz) (23 0610)    Labs:  Recent Labs     23  0203 12/10/23  0435 23  0210   WBC 18.4* 15.1* 12.9*   RBC 4.78 4.53* 4.39*   HEMOGLOBIN 14.7 13.7* 13.3*   HEMATOCRIT 44.4 42.1 40.2*   MCV 92.9 92.9 91.6   MCH 30.8 30.2 30.3   MCHC 33.1 32.5 33.1   RDW 45.4 45.9 44.9   PLATELETCT 256 235 258   MPV 9.4 9.2 9.2       Recent Labs     23  0203 23  0551 12/10/23  0435 23  0210   SODIUM 136  --  134* 135   POTASSIUM 4.4 4.5 4.2 4.0   CHLORIDE 103  --  101 102   CO2 21  --  25 26   GLUCOSE 120*  --  117* 100*   BUN 15  --  18 14   CREATININE 0.84  --  1.11 0.95   CALCIUM 7.5*  --  7.6* 7.7*       Recent Labs     23  1300   APTT 28.8   INR 1.24*         HgbA1c:  5.6    Diabetic Educator Consult:  N\A    Medications:  Scheduled Medications   Medication Dose Frequency    potassium chloride SA  20 mEq DAILY    furosemide  40 mg DAILY    fluticasone  1 Puff Q12HRS    amLODIPine  5 mg Q DAY    enoxaparin (LOVENOX)  injection  40 mg DAILY AT 1800    Nozin nasal  swab  1 Applicator BID    acetaminophen  1,000 mg Q6HRS    senna-docusate  2 Tablet BID    And    polyethylene glycol/lytes  1 Packet DAILY    And    magnesium hydroxide  30 mL DAILY    omeprazole  20 mg DAILY    aspirin  81 mg DAILY    clopidogrel  75 mg DAILY    metoprolol tartrate  25 mg BID    atorvastatin  80 mg Q EVENING    citalopram  30 mg DAILY        Exam:   Physical Exam  Constitutional:       General: He is not in acute distress.     Appearance: He is well-developed. He is not diaphoretic.   Cardiovascular:      Rate and Rhythm: Normal rate and regular rhythm.      Heart sounds: Normal heart sounds. No murmur heard.     No friction rub. No gallop.   Pulmonary:      Effort: Pulmonary effort is normal. No respiratory distress.      Breath sounds: Examination of the right-lower field reveals decreased breath sounds. Examination of the left-lower field reveals decreased breath sounds. Decreased breath sounds present. No wheezing or rales.   Abdominal:      General: There is no distension.      Palpations: Abdomen is soft.      Tenderness: There is no abdominal tenderness.   Musculoskeletal:      Cervical back: Neck supple.   Skin:     General: Skin is warm and dry.      Comments: Sternum- prevena  SVG site- cdi   Neurological:      Mental Status: He is alert and oriented to person, place, and time.       Cardiac Medications:    ASA - Yes    Plavix - Yes    Post-operative Beta Blockers - Yes    Ace/ARB- No; contraindicated because of Normal EF    Statin - Yes    Aldactone- No; contraindicated because of Normal EF    SGLT2i-  No contraindicated because of No; contraindicated because of Normal EF    Ejection Fraction:  55%    Telemetry:   12/9 SR  12/10 SR  12/11 SR     Assessment/Plan:  POD 1 HTN- on cleviprex, SR, keep mediastinal tubes, keep carrion, gently diurese    POD 2 HDS, SR- d/c pacer wires, d/c mediastinal tubes, gently diurese, d/c carrion,  incisions- c/d/I, ambulating, transfer to [facility]    POD 3  HDS, SR, neuro intact, wounds intact, abdomen soft -BM, fluid balance negative, wt close to admission wt, room air.  Plan: escalate bowel protocol, IS/ambulate. Remove prevena and shower. Awaiting PT/OT eval. Anticipate dc in 1-2 days.     Disposition:  TBD

## 2023-12-11 NOTE — THERAPY
"Occupational Therapy   Initial Evaluation     Patient Name: Edwin Hammond  Age:  67 y.o., Sex:  male  Medical Record #: [id number]  Today's Date: 12/11/2023     Precautions: Fall Risk, Cardiac Precautions (See Comments), Sternal Precautions (See Comments)    Assessment    Patient is 67 y.o. male with h/o HTN, DEREK, CAD, NSTEMI, admitted for CABG x 3. Pt seen for OT eval and treatment. Able to mobilize in room without AD, participate in toileting and LB dressing. Pt educated as outlined in Education Group below. Pt has good support from family on DC. Plans to obtain shower chair and wedge for bed. No further acute OT needs at this time.     Plan    Occupational Therapy Initial Treatment Plan   Duration: Evaluation only    DC Equipment Recommendations: Tub / Shower Seat, Reacher, Other (Comments) (Wedge for bed)  Discharge Recommendations: Anticipate that the patient will have no further occupational therapy needs after discharge from the hospital     Subjective    \"I'm tired, but otherwise okay.\"     Objective       12/11/23 0955   Prior Living Situation   Prior Services None;Home-Independent   Housing / Facility 1 Story House   Steps Into Home 2   Steps In Home 0   Elevator No   Bathroom Set up Walk In Shower   Equipment Owned None   Lives with - Patient's Self Care Capacity Adult Children;Child Less than 18 Years of Age   Comments Pt lives with daughter, PAU, 13 y.o. grandson. Reports daughter is not working and can assist as needed on DC   Prior Level of ADL Function   Comments Pt was independent with BADL PTA   Prior Level of IADL Function   Comments Pt was independent with I-ADL and functional mobility PTA; reports family assists with  per personal roles   Cognition    Cognition / Consciousness WDL   Level of Consciousness Alert   Comments pleasant, cooperative, receptive to education   Active ROM Upper Body   Active ROM Upper Body  WDL   Comments unilateral SF due to sternal prec   Strength " Upper Body   Upper Body Strength  WDL   Comments proximal resistance deferred due to sternal prec   Coordination Upper Body   Coordination WDL   Balance Assessment   Sitting Balance (Static) Good   Sitting Balance (Dynamic) Fair +   Standing Balance (Static) Fair   Standing Balance (Dynamic) Fair   Weight Shift Sitting Good   Weight Shift Standing Good   Comments no AD, no LOB   Bed Mobility    Supine to Sit Standby Assist   Sit to Supine Standby Assist   Scooting Supervised   Comments HOB elevated to 30 degrees; pt plans to purchase wedge   ADL Assessment   Grooming   (declined this session; educated on importance for PNA prophylaxis)   Lower Body Dressing Supervision  (doff/don B socks in modified tailor sit (supported on bed))   Toileting Supervision  (standing urination; simulated BM hygiene)   Functional Mobility   Sit to Stand Supervised   Bed, Chair, Wheelchair Transfer Supervised   Toilet Transfers Supervised   Transfer Method Stand Step  (no AD)   Mobility Bed mobility, transfers, short gait   Activity Tolerance   Sitting in Chair declined due to fatigue   Sitting Edge of Bed 10 min   Standing 6 min   Education Group   Role of Occupational Therapist Patient Response Patient;Acceptance;Explanation;Verbal Demonstration   Cardiac Precautions Patient Response Patient;Acceptance;Explanation;Handout;Verbal Demonstration;Action Demonstration  (pacing strategies, rest breaks, compensatory ADL)   Sternal Precautions Patient Response Patient;Acceptance;Explanation;Demonstration;Handout;Verbal Demonstration;Action Demonstration  (lifting restriction, avoid B SF/SA/SE/push/pull, compensatory ADL)   Home Safety Patient Response Patient;Acceptance;Explanation;Handout;Verbal Demonstration  (use of shower chair, reacher, wedge)

## 2023-12-11 NOTE — ANESTHESIA POSTPROCEDURE EVALUATION
Patient: Edwin Hammond    Procedure Summary       Date: 12/08/23 Room / Location: [facility] 03 / SURGERY [facility]    Anesthesia Start: 0754 Anesthesia Stop: 1258    Procedures:       CABG X 3, WITH ENDOSCOPIC VEIN PROCUREMENT (Chest)      ECHOCARDIOGRAM, TRANSESOPHAGEAL, INTRAOPERATIVE (Esophagus) Diagnosis: (CAD)    Surgeons: Carolyn Davalos M.D. Responsible Provider: Moshe Delgado M.D.    Anesthesia Type: general ASA Status: 4            Final Anesthesia Type: general  Last vitals  BP   Blood Pressure : 128/78    Temp   36.1 °C (97 °F)    Pulse   78   Resp   18    SpO2   95 %      Anesthesia Post Evaluation    Patient location during evaluation: floor  Patient participation: complete - patient participated  Level of consciousness: awake and alert    Airway patency: patent  Anesthetic complications: no  Cardiovascular status: hemodynamically stable  Respiratory status: acceptable  Hydration status: euvolemic    PONV: none          No notable events documented.     Nurse Pain Score: 5 (NPRS)

## 2023-12-11 NOTE — PROGRESS NOTES
Assumed care of patient at bedside report from day shift RN. Updated on POC. Patient currently A & O x 4; PT is SR on monitor; PT has 5/10 pain at this time; Call light within reach. Whiteboard updated. Fall precautions in place. Bed locked and in lowest position. All questions answered. No other needs indicated at this time.

## 2023-12-11 NOTE — PROGRESS NOTES
Assumed care of patient after receiving report from Zoe, night shift RN. Patient is currently Alert and Oriented x4 on Room Air. Bed locked and in lowest position. Call light within reach.

## 2023-12-12 LAB
ANION GAP SERPL CALC-SCNC: 11 MMOL/L (ref 7–16)
BUN SERPL-MCNC: 15 MG/DL (ref 8–22)
CALCIUM SERPL-MCNC: 7.8 MG/DL (ref 8.5–10.5)
CHLORIDE SERPL-SCNC: 100 MMOL/L (ref 96–112)
CO2 SERPL-SCNC: 24 MMOL/L (ref 20–33)
CREAT SERPL-MCNC: 1.05 MG/DL (ref 0.5–1.4)
ERYTHROCYTE [DISTWIDTH] IN BLOOD BY AUTOMATED COUNT: 45.2 FL (ref 35.9–50)
GFR SERPLBLD CREATININE-BSD FMLA CKD-EPI: 77 ML/MIN/1.73 M 2
GLUCOSE SERPL-MCNC: 101 MG/DL (ref 65–99)
HCT VFR BLD AUTO: 39.7 % (ref 42–52)
HGB BLD-MCNC: 13.1 G/DL (ref 14–18)
MCH RBC QN AUTO: 30.3 PG (ref 27–33)
MCHC RBC AUTO-ENTMCNC: 33 G/DL (ref 32.3–36.5)
MCV RBC AUTO: 91.9 FL (ref 81.4–97.8)
PLATELET # BLD AUTO: 293 K/UL (ref 164–446)
PMV BLD AUTO: 9.5 FL (ref 9–12.9)
POTASSIUM SERPL-SCNC: 3.8 MMOL/L (ref 3.6–5.5)
RBC # BLD AUTO: 4.32 M/UL (ref 4.7–6.1)
SODIUM SERPL-SCNC: 135 MMOL/L (ref 135–145)
WBC # BLD AUTO: 12.1 K/UL (ref 4.8–10.8)

## 2023-12-12 PROCEDURE — 700111 HCHG RX REV CODE 636 W/ 250 OVERRIDE (IP): Mod: JZ | Performed by: NURSE PRACTITIONER

## 2023-12-12 PROCEDURE — 700102 HCHG RX REV CODE 250 W/ 637 OVERRIDE(OP): Performed by: NURSE PRACTITIONER

## 2023-12-12 PROCEDURE — 770020 HCHG ROOM/CARE - TELE (206)

## 2023-12-12 PROCEDURE — 80048 BASIC METABOLIC PNL TOTAL CA: CPT

## 2023-12-12 PROCEDURE — 700102 HCHG RX REV CODE 250 W/ 637 OVERRIDE(OP): Performed by: INTERNAL MEDICINE

## 2023-12-12 PROCEDURE — A9270 NON-COVERED ITEM OR SERVICE: HCPCS | Performed by: NURSE PRACTITIONER

## 2023-12-12 PROCEDURE — 99024 POSTOP FOLLOW-UP VISIT: CPT | Performed by: NURSE PRACTITIONER

## 2023-12-12 PROCEDURE — 85027 COMPLETE CBC AUTOMATED: CPT

## 2023-12-12 PROCEDURE — A9270 NON-COVERED ITEM OR SERVICE: HCPCS | Performed by: INTERNAL MEDICINE

## 2023-12-12 RX ADMIN — CITALOPRAM HYDROBROMIDE 30 MG: 20 TABLET ORAL at 06:08

## 2023-12-12 RX ADMIN — AMLODIPINE BESYLATE 5 MG: 10 TABLET ORAL at 06:08

## 2023-12-12 RX ADMIN — AMIODARONE HYDROCHLORIDE 0.5 MG/MIN: 1.8 INJECTION, SOLUTION INTRAVENOUS at 01:14

## 2023-12-12 RX ADMIN — Medication 1 APPLICATOR: at 08:00

## 2023-12-12 RX ADMIN — Medication 1 APPLICATOR: at 20:00

## 2023-12-12 RX ADMIN — FLUTICASONE PROPIONATE 110 MCG: 110 AEROSOL, METERED RESPIRATORY (INHALATION) at 17:03

## 2023-12-12 RX ADMIN — FUROSEMIDE 40 MG: 10 INJECTION, SOLUTION INTRAMUSCULAR; INTRAVENOUS at 06:11

## 2023-12-12 RX ADMIN — CLOPIDOGREL BISULFATE 75 MG: 75 TABLET ORAL at 06:11

## 2023-12-12 RX ADMIN — METOPROLOL TARTRATE 25 MG: 25 TABLET, FILM COATED ORAL at 17:04

## 2023-12-12 RX ADMIN — ACETAMINOPHEN 1000 MG: 500 TABLET, FILM COATED ORAL at 06:11

## 2023-12-12 RX ADMIN — DOCUSATE SODIUM 50 MG AND SENNOSIDES 8.6 MG 2 TABLET: 8.6; 5 TABLET, FILM COATED ORAL at 06:08

## 2023-12-12 RX ADMIN — POTASSIUM CHLORIDE 20 MEQ: 1500 TABLET, EXTENDED RELEASE ORAL at 06:11

## 2023-12-12 RX ADMIN — METOPROLOL TARTRATE 25 MG: 25 TABLET, FILM COATED ORAL at 06:11

## 2023-12-12 RX ADMIN — FLUTICASONE PROPIONATE 110 MCG: 110 AEROSOL, METERED RESPIRATORY (INHALATION) at 06:18

## 2023-12-12 RX ADMIN — MAGNESIUM HYDROXIDE 30 ML: 1200 LIQUID ORAL at 06:11

## 2023-12-12 RX ADMIN — AMIODARONE HYDROCHLORIDE 400 MG: 200 TABLET ORAL at 06:08

## 2023-12-12 RX ADMIN — OXYCODONE HYDROCHLORIDE 10 MG: 10 TABLET ORAL at 17:09

## 2023-12-12 RX ADMIN — DOCUSATE SODIUM 50 MG AND SENNOSIDES 8.6 MG 2 TABLET: 8.6; 5 TABLET, FILM COATED ORAL at 17:05

## 2023-12-12 RX ADMIN — AMIODARONE HYDROCHLORIDE 400 MG: 200 TABLET ORAL at 15:52

## 2023-12-12 RX ADMIN — ATORVASTATIN CALCIUM 80 MG: 80 TABLET, FILM COATED ORAL at 17:05

## 2023-12-12 RX ADMIN — OMEPRAZOLE 20 MG: 20 CAPSULE, DELAYED RELEASE ORAL at 06:07

## 2023-12-12 RX ADMIN — OXYCODONE HYDROCHLORIDE 10 MG: 10 TABLET ORAL at 06:15

## 2023-12-12 RX ADMIN — ASPIRIN 81 MG: 81 TABLET, COATED ORAL at 06:08

## 2023-12-12 RX ADMIN — ACETAMINOPHEN 1000 MG: 500 TABLET, FILM COATED ORAL at 12:29

## 2023-12-12 RX ADMIN — OXYCODONE HYDROCHLORIDE 10 MG: 10 TABLET ORAL at 20:32

## 2023-12-12 RX ADMIN — POLYETHYLENE GLYCOL 3350 1 PACKET: 17 POWDER, FOR SOLUTION ORAL at 06:07

## 2023-12-12 RX ADMIN — OXYCODONE HYDROCHLORIDE 10 MG: 10 TABLET ORAL at 12:29

## 2023-12-12 RX ADMIN — ENOXAPARIN SODIUM 40 MG: 100 INJECTION SUBCUTANEOUS at 17:04

## 2023-12-12 ASSESSMENT — PAIN DESCRIPTION - PAIN TYPE: TYPE: ACUTE PAIN;SURGICAL PAIN

## 2023-12-12 ASSESSMENT — FIBROSIS 4 INDEX: FIB4 SCORE: 0.65

## 2023-12-12 NOTE — CARE PLAN
"The patient is Stable - Low risk of patient condition declining or worsening    Shift Goals  Clinical Goals: monitor heart rhythm  Patient Goals: rest, snacks  Family Goals: na    Progress made toward(s) clinical / shift goals:    Problem: Post Op Day 4 CABG/Heart Valve Replacement  Goal: Optimal care of the Post Op CABG/Heart Valve replacement Post Op Day 4  Outcome: Progressing  Intervention: Daily weights in the morning  Note: Daily weight completed with AM walk.   Intervention: Shower daily and clean incisions twice daily with soap and water  Note: Patient showered during day shift, CHG bath provided during night shift.   Intervention: Up in chair for all meals  Note: Up in chair for all meals.   Intervention: Ambulate 4 times daily, increasing the distance each time  Note: Patient walked 4 times, patient walked entire unit during PM walk.   Intervention: IS q 1 hour while awake and record best IS volume  Note: Patient pulling 1750 on IS.  Intervention: Complete \"Home O2 Assessment' in vitals flowsheets if unable to wean off O2  Note: Patient on room air.   Intervention: Consider removal of carrion, chest tube and pacer wires if not already done  Note: Carrion removed, patient voiding. I/Os documented.        Patient is not progressing towards the following goals:      "

## 2023-12-12 NOTE — THERAPY
Physical Therapy   Initial Evaluation     Patient Name: Edwin Hammond  Age:  67 y.o., Sex:  male  Medical Record #: [id number]  Today's Date: 12/11/2023     Precautions  Precautions: Fall Risk;Cardiac Precautions (See Comments);Sternal Precautions (See Comments)    Assessment  Patient is 67 y.o. male transferred from outside facility with NSTEMI, now s/p CABG x 3 on 12/8.  PMH includes obesity, sleep apnea, HTN, asthma, and smoking, and marijuana use daily.  Pt was seen for PT evaluation, demo'd all functional mobility as detailed below with SPV without AD.  Pt ambulated with quick pace, educated on pacing and HR goal of 100 bpm.  Educated pt on post-op OHS handout including talk test, HR monitoring & goal, home walking program, sternal precautions, adequate rest breaks, and phases of cardiac rehab.  No further acute PT needs.    Plan    Physical Therapy Initial Treatment Plan   Duration: Evaluation only    DC Equipment Recommendations: None  Discharge Recommendations: (Outpatient cardiac rehab as appropriate, pt lives in [facility].)     Objective     12/11/23 1544   Precautions   Precautions Fall Risk;Cardiac Precautions (See Comments);Sternal Precautions (See Comments)   Pain 0 - 10 Group   Therapist Pain Assessment Post Activity Pain Same as Prior to Activity;Nurse Notified;0   Prior Living Situation   Prior Services Home-Independent   Housing / Facility 1 Story House   Steps Into Home 2   Equipment Owned None   Lives with - Patient's Self Care Capacity Adult Children;Child Less than 18 Years of Age   Comments Pt lives with his daughter, PAU, and 13 y.o. grandson who are available to assist as needed   Prior Level of Functional Mobility   Bed Mobility Independent   Transfer Status Independent   Ambulation Independent   Ambulation Distance community distances   Assistive Devices Used None   Stairs Independent   Cognition    Cognition / Consciousness WDL   Level of Consciousness Alert   Comments Pleasant & receptive  to education   Active ROM Lower Body    Active ROM Lower Body  WDL   Strength Lower Body   Lower Body Strength  WDL   Sensation Lower Body   Lower Extremity Sensation   WDL   Comments Denied N/T   Balance Assessment   Sitting Balance (Static) Fair +   Sitting Balance (Dynamic) Fair +   Standing Balance (Static) Fair +   Standing Balance (Dynamic) Fair   Weight Shift Sitting Good   Weight Shift Standing Good   Bed Mobility    Comments NT, up in chair pre & post session; reported plans to purchase a wedge pillow & family able to assist if needed   Gait Analysis   Gait Level Of Assist Supervised   Assistive Device None   Distance (Feet) 300   # of Times Distance was Traveled 2   Deviation (quick pace)   # of Stairs Climbed 3   Level of Assist with Stairs Supervised   Weight Bearing Status No restrictions   Comments HR up to 110 (calculated goal 100bpm)   Functional Mobility   Sit to Stand Supervised   Bed, Chair, Wheelchair Transfer Supervised   Toilet Transfers Supervised   Transfer Method Stand Step   Mobility ambulation, stairs, up to chair   Activity Tolerance   Comments functional   Education Group   Education Provided Role of Physical Therapist;Cardiac Precautions;Sternal Precautions   Cardiac Precautions Patient Response Patient;Acceptance;Explanation;Verbal Demonstration;Handout   Sternal Precautions Patient Response Patient;Acceptance;Explanation;Verbal Demonstration;Handout   Role of Physical Therapist Patient Response Patient;Acceptance;Explanation;Verbal Demonstration;Handout   Physical Therapy Initial Treatment Plan    Duration Evaluation only

## 2023-12-12 NOTE — PROGRESS NOTES
Cardiovascular Surgery Progress Note    Name: Edwin Hammond  MRN: [id number]  : 1955  Admit Date: 2023 11:48 PM  4 Days Post-Op     Procedure:  Procedure(s) and Anesthesia Type:     * CABG X 3, WITH ENDOSCOPIC VEIN PROCUREMENT - General     * ECHOCARDIOGRAM, TRANSESOPHAGEAL, INTRAOPERATIVE - General    Vitals:  Vitals:    23 0608 23 0611 23 0637 23 0740   BP: 128/75 128/75  101/56   Pulse:  78  71   Resp:  18  17   Temp:  36.7 °C (98.1 °F)  36.3 °C (97.3 °F)   TempSrc:  Temporal  Temporal   SpO2:  96%  97%   Weight:   113 kg (248 lb 0.3 oz)    Height:          Temp (24hrs), Av.5 °C (97.7 °F), Min:36.3 °C (97.3 °F), Max:36.7 °C (98.1 °F)      Respiratory:    Respiration: 17, Pulse Oximetry: 97 %       Fluids:    Intake/Output Summary (Last 24 hours) at 2023 0913  Last data filed at 2023 0740  Gross per 24 hour   Intake 400 ml   Output 2470 ml   Net -2070 ml       Admit weight: Weight: 114 kg (250 lb 14.1 oz)  Current weight: Weight: 113 kg (248 lb 0.3 oz) (23 0637)    Labs:  Recent Labs     12/10/23  0435 23  0210 23  0120   WBC 15.1* 12.9* 12.1*   RBC 4.53* 4.39* 4.32*   HEMOGLOBIN 13.7* 13.3* 13.1*   HEMATOCRIT 42.1 40.2* 39.7*   MCV 92.9 91.6 91.9   MCH 30.2 30.3 30.3   MCHC 32.5 33.1 33.0   RDW 45.9 44.9 45.2   PLATELETCT 235 258 293   MPV 9.2 9.2 9.5       Recent Labs     12/10/23  0435 23  0210 23  1840 23  0120   SODIUM 134* 135  --  135   POTASSIUM 4.2 4.0 4.1 3.8   CHLORIDE 101 102  --  100   CO2 25 26  --  24   GLUCOSE 117* 100*  --  101*   BUN 18 14  --  15   CREATININE 1.11 0.95  --  1.05   CALCIUM 7.6* 7.7*  --  7.8*               HgbA1c:  5.6    Diabetic Educator Consult:  N\A    Medications:  Scheduled Medications   Medication Dose Frequency    amiodarone  400 mg BID    potassium chloride SA  20 mEq DAILY    furosemide  40 mg DAILY    fluticasone  1 Puff Q12HRS    amLODIPine  5 mg Q DAY    enoxaparin (LOVENOX)  injection  40 mg DAILY AT 1800    Nozin nasal  swab  1 Applicator BID    acetaminophen  1,000 mg Q6HRS    senna-docusate  2 Tablet BID    And    polyethylene glycol/lytes  1 Packet DAILY    And    magnesium hydroxide  30 mL DAILY    omeprazole  20 mg DAILY    aspirin  81 mg DAILY    clopidogrel  75 mg DAILY    metoprolol tartrate  25 mg BID    atorvastatin  80 mg Q EVENING    citalopram  30 mg DAILY        Exam:   Physical Exam  Constitutional:       General: He is not in acute distress.     Appearance: He is well-developed. He is not diaphoretic.   Cardiovascular:      Rate and Rhythm: Normal rate and regular rhythm.      Heart sounds: Normal heart sounds. No murmur heard.     No friction rub. No gallop.   Pulmonary:      Effort: Pulmonary effort is normal. No respiratory distress.      Breath sounds: Examination of the right-lower field reveals decreased breath sounds. Examination of the left-lower field reveals decreased breath sounds. Decreased breath sounds present. No wheezing or rales.   Abdominal:      General: There is no distension.      Palpations: Abdomen is soft.      Tenderness: There is no abdominal tenderness.   Musculoskeletal:      Cervical back: Neck supple.   Skin:     General: Skin is warm and dry.      Comments: Sternum- prevena  SVG site- cdi   Neurological:      Mental Status: He is alert and oriented to person, place, and time.       Cardiac Medications:    ASA - Yes    Plavix - Yes    Post-operative Beta Blockers - Yes    Ace/ARB- No; contraindicated because of Normal EF    Statin - Yes    Aldactone- No; contraindicated because of Normal EF    SGLT2i-  No contraindicated because of No; contraindicated because of Normal EF    Ejection Fraction:  55%    Telemetry:   12/9 SR  12/10 SR  12/11 SR   12/12 SR    Assessment/Plan:  POD 1 HTN- on cleviprex, SR, keep mediastinal tubes, keep carrion, gently diurese    POD 2 HDS, SR- d/c pacer wires, d/c mediastinal tubes, gently diurese, d/c  carrion, incisions- c/d/I, ambulating, transfer to tele    POD 3  HDS, SR, neuro intact, wounds intact, abdomen soft -BM, fluid balance negative, wt close to admission wt, room air.  Plan: escalate bowel protocol, IS/ambulate. Remove prevena and shower. Awaiting PT/OT eval. Anticipate dc in 1-2 days.     POD 4 HDS.  Afib overnight now SR.  On amio gtt.  Neuro intact.  Wounds CDI.  Abdomen soft nontender.  Cr 1.05 Hgb 13.1.  No BM yet.  RA.  Weight at admit.   Plan: DC amio.  Continue bowel therapy.  Home tomorrow after bowel movement.      Disposition:  12/12 PT OT no needs.

## 2023-12-12 NOTE — PROGRESS NOTES
Assumed care of patient after receiving report from 0700, night shift RN. Patient is currently Alert and Oriented x4 on Room Air. Bed locked and in lowest position. Call light within reach.    100

## 2023-12-12 NOTE — PROGRESS NOTES
Assumed care of patient, received bedside report from Andrey ONTIVEROS. Patient is A&O X 4. Pain 0/10, on RA. On tele monitor, SR 83. POC discussed with patient. Patient verbalized understanding. All questions answered. Call light within reach and fall precautions in place. Bed alarm on, bed locked and in lowest position.

## 2023-12-12 NOTE — PROGRESS NOTES
Monitor Summary:    Rhythm: SR @ 1900  Rate: 77-81  Ectopy: none  Measurement : .16/.08/.36

## 2023-12-12 NOTE — PROGRESS NOTES
Monitor Summary  Rhythm: Atrial Fibrillation  Rate: 90-100s  Ectopy: PVCs  .18 / .08 / .36

## 2023-12-13 ENCOUNTER — PHARMACY VISIT (OUTPATIENT)
Dept: PHARMACY | Facility: MEDICAL CENTER | Age: 68
End: 2023-12-13
Payer: COMMERCIAL

## 2023-12-13 VITALS
HEIGHT: 68 IN | TEMPERATURE: 97.6 F | BODY MASS INDEX: 37.86 KG/M2 | SYSTOLIC BLOOD PRESSURE: 129 MMHG | RESPIRATION RATE: 17 BRPM | DIASTOLIC BLOOD PRESSURE: 69 MMHG | HEART RATE: 72 BPM | WEIGHT: 249.78 LBS | OXYGEN SATURATION: 96 %

## 2023-12-13 LAB
ANION GAP SERPL CALC-SCNC: 8 MMOL/L (ref 7–16)
BUN SERPL-MCNC: 16 MG/DL (ref 8–22)
CALCIUM SERPL-MCNC: 8.3 MG/DL (ref 8.5–10.5)
CHLORIDE SERPL-SCNC: 99 MMOL/L (ref 96–112)
CO2 SERPL-SCNC: 26 MMOL/L (ref 20–33)
CREAT SERPL-MCNC: 1.02 MG/DL (ref 0.5–1.4)
ERYTHROCYTE [DISTWIDTH] IN BLOOD BY AUTOMATED COUNT: 46.2 FL (ref 35.9–50)
GFR SERPLBLD CREATININE-BSD FMLA CKD-EPI: 80 ML/MIN/1.73 M 2
GLUCOSE SERPL-MCNC: 99 MG/DL (ref 65–99)
HCT VFR BLD AUTO: 40.1 % (ref 42–52)
HGB BLD-MCNC: 13.3 G/DL (ref 14–18)
MCH RBC QN AUTO: 30.9 PG (ref 27–33)
MCHC RBC AUTO-ENTMCNC: 33.2 G/DL (ref 32.3–36.5)
MCV RBC AUTO: 93 FL (ref 81.4–97.8)
PLATELET # BLD AUTO: 326 K/UL (ref 164–446)
PMV BLD AUTO: 9.2 FL (ref 9–12.9)
POTASSIUM SERPL-SCNC: 4.4 MMOL/L (ref 3.6–5.5)
RBC # BLD AUTO: 4.31 M/UL (ref 4.7–6.1)
SODIUM SERPL-SCNC: 133 MMOL/L (ref 135–145)
WBC # BLD AUTO: 12 K/UL (ref 4.8–10.8)

## 2023-12-13 PROCEDURE — 700102 HCHG RX REV CODE 250 W/ 637 OVERRIDE(OP): Performed by: INTERNAL MEDICINE

## 2023-12-13 PROCEDURE — A9270 NON-COVERED ITEM OR SERVICE: HCPCS | Performed by: NURSE PRACTITIONER

## 2023-12-13 PROCEDURE — 85027 COMPLETE CBC AUTOMATED: CPT

## 2023-12-13 PROCEDURE — 700102 HCHG RX REV CODE 250 W/ 637 OVERRIDE(OP): Performed by: NURSE PRACTITIONER

## 2023-12-13 PROCEDURE — A9270 NON-COVERED ITEM OR SERVICE: HCPCS | Performed by: INTERNAL MEDICINE

## 2023-12-13 PROCEDURE — 80048 BASIC METABOLIC PNL TOTAL CA: CPT

## 2023-12-13 PROCEDURE — 700111 HCHG RX REV CODE 636 W/ 250 OVERRIDE (IP): Performed by: NURSE PRACTITIONER

## 2023-12-13 PROCEDURE — RXMED WILLOW AMBULATORY MEDICATION CHARGE: Performed by: NURSE PRACTITIONER

## 2023-12-13 RX ORDER — ASPIRIN 81 MG/1
81 TABLET ORAL DAILY
Qty: 90 TABLET | Refills: 2 | Status: SHIPPED | OUTPATIENT
Start: 2023-12-14

## 2023-12-13 RX ORDER — OXYCODONE HYDROCHLORIDE 5 MG/1
5 TABLET ORAL EVERY 6 HOURS PRN
Qty: 56 TABLET | Refills: 0 | Status: SHIPPED | OUTPATIENT
Start: 2023-12-13 | End: 2023-12-27

## 2023-12-13 RX ORDER — POTASSIUM CHLORIDE 750 MG/1
20 TABLET, EXTENDED RELEASE ORAL DAILY
Qty: 60 TABLET | Refills: 0 | Status: SHIPPED | OUTPATIENT
Start: 2023-12-13 | End: 2024-03-25

## 2023-12-13 RX ORDER — FUROSEMIDE 20 MG/1
20 TABLET ORAL DAILY
Qty: 30 TABLET | Refills: 0 | Status: SHIPPED | OUTPATIENT
Start: 2023-12-13 | End: 2024-03-25

## 2023-12-13 RX ORDER — AMIODARONE HYDROCHLORIDE 400 MG/1
400 TABLET ORAL 2 TIMES DAILY
Qty: 56 TABLET | Refills: 1 | Status: SHIPPED | OUTPATIENT
Start: 2023-12-13 | End: 2024-03-25

## 2023-12-13 RX ORDER — ACETAMINOPHEN 500 MG
1000 TABLET ORAL EVERY 6 HOURS
Qty: 30 TABLET | Refills: 0 | COMMUNITY
Start: 2023-12-13

## 2023-12-13 RX ORDER — ATORVASTATIN CALCIUM 80 MG/1
80 TABLET, FILM COATED ORAL EVERY EVENING
Qty: 30 TABLET | Refills: 2 | Status: SHIPPED | OUTPATIENT
Start: 2023-12-13

## 2023-12-13 RX ADMIN — POTASSIUM CHLORIDE 20 MEQ: 1500 TABLET, EXTENDED RELEASE ORAL at 05:07

## 2023-12-13 RX ADMIN — DOCUSATE SODIUM 50 MG AND SENNOSIDES 8.6 MG 2 TABLET: 8.6; 5 TABLET, FILM COATED ORAL at 05:07

## 2023-12-13 RX ADMIN — ASPIRIN 81 MG: 81 TABLET, COATED ORAL at 05:06

## 2023-12-13 RX ADMIN — ACETAMINOPHEN 1000 MG: 500 TABLET, FILM COATED ORAL at 05:07

## 2023-12-13 RX ADMIN — CLOPIDOGREL BISULFATE 75 MG: 75 TABLET ORAL at 05:07

## 2023-12-13 RX ADMIN — AMLODIPINE BESYLATE 5 MG: 10 TABLET ORAL at 05:07

## 2023-12-13 RX ADMIN — ACETAMINOPHEN 1000 MG: 500 TABLET, FILM COATED ORAL at 01:05

## 2023-12-13 RX ADMIN — FUROSEMIDE 40 MG: 10 INJECTION, SOLUTION INTRAMUSCULAR; INTRAVENOUS at 05:07

## 2023-12-13 RX ADMIN — Medication 1 APPLICATOR: at 08:11

## 2023-12-13 RX ADMIN — OMEPRAZOLE 20 MG: 20 CAPSULE, DELAYED RELEASE ORAL at 05:07

## 2023-12-13 RX ADMIN — METOPROLOL TARTRATE 25 MG: 25 TABLET, FILM COATED ORAL at 05:07

## 2023-12-13 RX ADMIN — CITALOPRAM HYDROBROMIDE 30 MG: 20 TABLET ORAL at 05:07

## 2023-12-13 RX ADMIN — AMIODARONE HYDROCHLORIDE 400 MG: 200 TABLET ORAL at 05:06

## 2023-12-13 RX ADMIN — FLUTICASONE PROPIONATE 110 MCG: 110 AEROSOL, METERED RESPIRATORY (INHALATION) at 05:10

## 2023-12-13 RX ADMIN — OXYCODONE HYDROCHLORIDE 10 MG: 10 TABLET ORAL at 01:05

## 2023-12-13 ASSESSMENT — PAIN DESCRIPTION - PAIN TYPE: TYPE: ACUTE PAIN

## 2023-12-13 ASSESSMENT — FIBROSIS 4 INDEX: FIB4 SCORE: 0.58

## 2023-12-13 NOTE — PROGRESS NOTES
Monitor Summary  Rhythm: Normal Sinus Rhythm  Rate: 70-80s  Ectopy: PVCs  .18 / .09 / .41

## 2023-12-13 NOTE — PROGRESS NOTES
Patient discharged into care of mother. Patient showed no signs or symptoms of distress at time of discharge.

## 2023-12-13 NOTE — PROGRESS NOTES
Monitor Summary:    Rhythm: SR  Rate: 74-80  Ectopy: I/O Afib w/ HR up to 110  Measurement : .18/.07/.36

## 2023-12-13 NOTE — PROGRESS NOTES
Assumed care of patient, received bedside report from Andrey ONTIVEROS. Patient is A&O X 4. Pain 8/10, on RA. On tele monitor, SR 74. POC discussed with patient. Patient verbalized understanding. All questions answered. Call light within reach and fall precautions in place. Bed locked and in lowest position.

## 2023-12-13 NOTE — PROGRESS NOTES
Assumed care of patient and received morning report from night RN. A&O x4. VS stable. Tele monitor in place. Plan of care discussed with patient and verbalized understanding. Bed locked and in lowest position. Pt educated to fall risk. Fall precautions in place. Call light within reach. All questions answered. No other needs indicated at this time.

## 2023-12-13 NOTE — CARE PLAN
"The patient is Stable - Low risk of patient condition declining or worsening    Shift Goals  Clinical Goals: bowel mvmt  Patient Goals: bowel mvmt  Family Goals: na    Progress made toward(s) clinical / shift goals:    Problem: Post Op Day 5 CABG/Heart Valve replacement  Goal: Optimal care of the post op CABG/Heart Valve replacement post op day 5  Outcome: Progressing  Note: Intervention: Daily weights in the morning  Note: Daily weight completed with AM walk.   Intervention: Shower daily and clean incisions twice daily with soap and water  Note: Patient showered during day shift, CHG bath provided during night shift.   Intervention: Up in chair for all meals  Note: Up in chair for all meals.   Intervention: Ambulate 4 times daily, increasing the distance each time  Note: Patient walked 4 times, patient walked entire unit during AM walk without assistance.   Intervention: IS q 1 hour while awake and record best IS volume  Note: Patient pulling 2000 on IS.  Intervention: Complete \"Home O2 Assessment' in vitals flowsheets if unable to wean off O2  Note: Patient on room air.   Intervention: Consider removal of carrion, chest tube and pacer wires if not already done  Note: Carrion removed, patient voiding. I/Os documented.        Patient is not progressing towards the following goals:      "

## 2023-12-13 NOTE — DISCHARGE SUMMARY
DISCHARGE SUMMARY    ADMISSION DATE: 12/5/2023    DISCHARGE DATE: 12/13/23    ADMITTING DIAGNOSES: multivessel CAD with NSTEMI this admission, HTN, HLD, morbid obesity, signficant cigarette smoking history, recent bronchitis symptoms, DEREK, asthma, daily marijuana smoker     DISCHARGE DIAGNOSES: multivessel CAD with NSTEMI this admission, HTN, HLD, morbid obesity, signficant cigarette smoking history, recent bronchitis symptoms, DEREK, asthma, daily marijuana smoker     PROCEDURES PERFORMED:   12/5/23 Dr. Davalos  Coronary artery bypass grafting x3  Left internal mammary artery to left anterior descending artery  Reversed saphenous vein graft to obtuse marginal artery #3  Reversed saphenous vein graft to distal right coronary artery  Right coronary artery endarterectomy  Endo-vein procurement    HISTORY OF PRESENT ILLNESS:  The patient is a 67 y.o. male with a past medical history of DEREK, morbid obesity, hypertension, asthma who presented to [facility] on 12/5/23 for substernal chest pain that had been going on for 5 days.  It does not radiate.  It is alleviated by rest and worsened with exertion.  He was transferred to [facility] for elevated troponins.  He was seen by cardiology and underwent a left heart catheterization which shows multivessel disease.  He has had some chest congestion and productive cough over the last week with associated chills.  He denies syncope, lower extremity edema.    He lives with his daughter, PAU, and grandchild.    HOSPITAL COURSE:   POD 1 HTN- on cleviprex, SR, keep mediastinal tubes, keep carrion, gently diurese     POD 2 HDS, SR- d/c pacer wires, d/c mediastinal tubes, gently diurese, d/c carrion, incisions- c/d/I, ambulating, transfer to [facility]     POD 3  HDS, SR, neuro intact, wounds intact, abdomen soft -BM, fluid balance negative, wt close to admission wt, room air.  Plan: escalate bowel protocol, IS/ambulate. Remove prevena and shower. Awaiting PT/OT eval. Anticipate dc in 1-2  days.      POD 4 HDS.  Afib overnight now SR.  On amio gtt.  Neuro intact.  Wounds CDI.  Abdomen soft nontender.  Cr 1.05 Hgb 13.1.  No BM yet.  RA.  Weight at admit.   Plan: DC amio.  Continue bowel therapy.  Home tomorrow after bowel movement.       POD 5  HDS, SR/rate controlled a fib, neuro intact, wounds intact, abdomen soft +BM, fluid balance negative, wt down, room air.  Plan:  Discharge home today. Stop plavix, start eliquis. Patient to follow up in clinic in one month.     TELEMETRY:  12/9 SR  12/10 SR  12/11 SR   12/12 SR  12/13 SR    RECENT LABS:     Lab Results   Component Value Date/Time    SODIUM 133 (L) 12/13/2023 01:00 AM    POTASSIUM 4.4 12/13/2023 01:00 AM    CHLORIDE 99 12/13/2023 01:00 AM    CO2 26 12/13/2023 01:00 AM    GLUCOSE 99 12/13/2023 01:00 AM    BUN 16 12/13/2023 01:00 AM    CREATININE 1.02 12/13/2023 01:00 AM      Lab Results   Component Value Date/Time    WBC 12.0 (H) 12/13/2023 01:00 AM    RBC 4.31 (L) 12/13/2023 01:00 AM    HEMOGLOBIN 13.3 (L) 12/13/2023 01:00 AM    HEMATOCRIT 40.1 (L) 12/13/2023 01:00 AM    MCV 93.0 12/13/2023 01:00 AM    MCH 30.9 12/13/2023 01:00 AM    MCHC 33.2 12/13/2023 01:00 AM    MPV 9.2 12/13/2023 01:00 AM    NEUTSPOLYS 58.70 12/07/2023 12:59 PM    LYMPHOCYTES 30.00 12/07/2023 12:59 PM    MONOCYTES 8.90 12/07/2023 12:59 PM    EOSINOPHILS 1.30 12/07/2023 12:59 PM    BASOPHILS 0.80 12/07/2023 12:59 PM      Lab Results   Component Value Date/Time    PROTHROMBTM 15.8 (H) 12/08/2023 01:00 PM    INR 1.24 (H) 12/08/2023 01:00 PM        Fluids:    Intake/Output Summary (Last 24 hours) at 12/13/2023 1057  Last data filed at 12/13/2023 0300  Gross per 24 hour   Intake 240 ml   Output --   Net 240 ml     Admit weight: Weight: 114 kg (250 lb 14.1 oz)  Current weight: Weight: 113 kg (249 lb 12.5 oz) (12/13/23 0424)    ALLERGIES:     Patient has no known allergies.    EJECTION FRACTION:  60%    CARDIAC MEDICATIONS:    ASA - Yes    Plavix - No; contraindicated because of On  Coumadin / NOAC    Post-operative Beta Blockers - Yes    Ace/ARB- No; contraindicated because of Normal EF    Statin - Yes    Aldactone- No; contraindicated because of Normal EF    SGLT2i-  No contraindicated because of No; contraindicated because of Normal EF    DISCHARGE MEDICATIONS:      Medication List        START taking these medications        Instructions   acetaminophen 500 MG Tabs  Commonly known as: Tylenol   Take 2 Tablets by mouth every 6 hours.  Dose: 1,000 mg     amiodarone 400 MG tablet  Commonly known as: Pacerone   Doctor's comments: .amio  Take 1 Tablet by mouth 2 times a day. Take 2 Tablets BID x 7 days, Then Take Two Tablets daily for 7 days, Then Take 1 Tablet Daily.  Dose: 400 mg     apixaban 5 MG Tabs tablet  Commonly known as: Eliquis   Take 1 Tablet by mouth 2 times a day.  Dose: 5 mg     aspirin 81 MG EC tablet  Start taking on: December 14, 2023   Take 1 Tablet by mouth every day.  Dose: 81 mg     atorvastatin 80 MG tablet  Commonly known as: Lipitor   Take 1 Tablet by mouth every evening.  Dose: 80 mg     furosemide 20 MG Tabs  Commonly known as: Lasix   Take 1 Tablet by mouth every day.  Dose: 20 mg     metoprolol tartrate 25 MG Tabs  Commonly known as: Lopressor   Take 1 Tablet by mouth 2 times a day.  Dose: 25 mg     oxyCODONE immediate-release 5 MG Tabs  Commonly known as: Roxicodone   Take 1 Tablet by mouth every 6 hours as needed for Severe Pain for up to 14 days.  Dose: 5 mg     potassium chloride SA 10 MEQ Tbcr  Commonly known as: K-Dur   Take 2 Tablets by mouth every day.  Dose: 20 mEq            CONTINUE taking these medications        Instructions   Albuterol Sulfate 108 (90 Base) MCG/ACT Aepb   Inhale 2 Puffs 2 times a day as needed.  Dose: 2 Puff     amLODIPine 5 MG Tabs  Commonly known as: Norvasc   Take 5 mg by mouth every day.  Dose: 5 mg     Citalopram Hydrobromide 30 MG Caps   Take 30 mg by mouth every day.  Dose: 30 mg     mometasone 220 MCG/INH inhaler  Commonly known  as: ASMANEX   Inhale 2 Puffs by mouth every day.  Dose: 2 Puff     TESTOSTERONE CYPIONATE IM   Inject 1 Each into the shoulder, thigh, or buttocks every 7 days. Indications: for low testosterone  Dose: 1 Each            STOP taking these medications      lovastatin 40 MG tablet  Commonly known as: Mevacor              NARCOTIC PAIN MEDICATIONS:   In prescribing controlled substances to this patient, I certify that I have obtained and reviewed their medical history. I have also made a good geni effort to obtain applicable records from other providers who have treated the patient .    I have conducted a physical exam and documented it. I have reviewed the patient's prescription history as maintained by the [place] Prescription Monitoring Program.     I have assessed the patient’s risk for abuse, dependency, and addiction using the validated Opioid Risk Tool.    Given the above, I believe the benefits of controlled substance therapy outweigh the risks. The reasons for prescribing controlled substances include non-narcotic, oral analgesic alternatives have been inadequate for pain control. Accordingly, I have discussed the risk and benefits, treatment plan, and alternative therapies with the patient.     Pt understands this prescription is a controlled substance which is potentially habit-forming and its use is regulated by the [place]. It must be submitted to the pharmacy within 5 days of the date written and can not be called in or faxed to the pharmacy. Refills are subject to terms of a medicine agreement. Any refill requires a new prescription that must be obtained from this office during regular office hours Monday through Thursday 7 am to 4 pm. We ask for 72 hours notice to get an appointment for a narcotic pain medication refill. This medicine can cause nausea, significant constipation, sedation, confusion.     DIET:   Cardiac diet    DISCHARGE INSTRUCTIONS DISCUSSED WITH THE PATIENT:      1. NO driving for 4 weeks  after surgery. You may ride as a passenger.  2. NO lifting of any item over 10 lbs (e.g. gallon of milk) for 6 weeks after surgery.  3. DO walk as much as possible! Walk a minimum of once a day. Depending on your fatigue and comfort level, you may walk as much as you wish. There is no maximum.  4. Other physical activities (sex, housework, gardening, etc.) are OK after 4 weeks   5. Continue using incentive spirometer for 2 weeks, especially if going home on oxygen.    Incision Care:  1. SHOWER ONLY - no baths. Clean incision daily with plain Ivory ® soap or any other dye or perfume free soap. Then pat incision dry with clean towel. Avoid creams or lotions on the incision(s).  a. If there is any increase in redness or swelling, or separation of the incision line, or thick drainage* from any of the incisions, call right away  * Clear, thin drainage is not abnormal especially from the leg incision and/or                         chest tube sites.  2. Continue to wear your EVI Stockings for 4 weeks. You may take off the stockings when in bed or when the legs are elevated.    Patient instructed to call [facility] cardiac surgery at [phone]  if any increased shortness of breath, uncontrolled pain, weight gain greater than 3 pounds in 1 day or 5 pounds in 1 week, SBP >140, HR <60 or redness swelling or drainage of incisions.      FOLLOW-UP:   Future Appointments   Date Time Provider Department Center   1/15/2024 12:30 PM CT RESOURCE PROVIDER [facility] None   1/19/2024  3:15 PM [facility] None

## 2023-12-13 NOTE — DISCHARGE INSTRUCTIONS
DIVISION OF CARDIAC SURGERY   DISCHARGE INSTRUCTIONS    Activity:    NO driving for 4 weeks after surgery. You may ride as a passenger.  NO lifting, pushing, or pulling more than 10 pounds for 6 weeks.  For the next 6 weeks, keep your elbows close to your body and move within a pain-free motion when lifting, pushing or pulling.  Do not stretch both arms backwards at the same time.    Walk at least 4 times per day, there is no maximum. The goal is to increase your distance over time.  Continue using incentive spirometer for 2 weeks or until your baseline volume is reached.  If you are going home on oxygen and you were not on oxygen prior to surgery, keep using until you are oxygen free.  Weigh yourself daily.  Call your Cardiologist for a weight gain of 3 or more pounds in 1 day or more than 5 pounds in 7 days.  Take all of your medications as prescribed. Do not use a pill box for the first month at home. If you have questions, please call your nurse navigator at [phone].  Continue to wear the EVI (compression) stockings for 2-4 weeks or until all swelling is gone. You may take them off when you are in bed or when your legs are elevated.    Incision Care:    Make sure to clean your incision(s) TWICE DAILY.  Once by showering AND once using the no rinse Foam cleanser provided in the hospital.  During the shower, cleanse the incision(s) with a perfume and dye free soap (Dial, Dove, [place])  Use gentle pressure and rub up and down over incision with your hands or a washcloth. Rinse off and pat incision(s) dry with clean towel.  Keep the incision open to air. No creams or lotions on your incision(s). No baths.  If there is any increased redness or swelling, separation of the incision line, or thick drainage from any of your incisions, call the Cardiac Surgeons ([phone]).      General Instructions:    You have been referred to Cardiac Rehab.  You can start Cardiac Rehab 30 days after surgery.  If you do  not have an appointment at the time of discharge call [phone] to schedule an appointment.  Your Primary Care Doctor typically handles home oxygen. Oxygen may be stopped when your oxygen level is consistently greater than 90.  Check with your Primary Care Doctor if you are unsure.  Take all of your medications (including pain medications) as prescribed.  Taking medications other than prescribed can result in serious injury.    For Patients Discharged with Narcotic Pain Medication:     If a refill is needed, understand that only 1 refill will be provided and you must come to the Cardiac Surgeons’ office for an appointment (72 hours’ notice is required to schedule and there are no weekend appointments).  If the pain medications you are discharged on are not working, you will need to bring your remaining prescription into the office in order to receive a new prescription.  If you were taking narcotics prior to your heart surgery, the Cardiac Surgeons will provide you with one prescription and additional medications will need to be provided by your pain management doctor.  Do not drink alcohol while taking narcotics.  Lost or stolen medications will not be refilled.  If medications are stolen, report to law enforcement.    Contact Cardiac Surgery at [phone] if you have any questions.

## 2023-12-15 NOTE — PROGRESS NOTES
"CHIEF COMPLAINT: Post-op visit     PROCEDURE:   12/5/23 Dr. Davalos  Coronary artery bypass grafting x3  Left internal mammary artery to left anterior descending artery  Reversed saphenous vein graft to obtuse marginal artery #3  Reversed saphenous vein graft to distal right coronary artery  Right coronary artery endarterectomy  Endo-vein procurement    HPI: Patient presents to clinic for 1 month follow-up.  He states he was feeling well until couple days ago and he believes he has a cold now.  He has been coughing up thin mucus and states he is congested and feels short of breath and has chills.  Prior to this he has been walking without shortness of breath and no chest pain.  No JVD or lower extremity swelling. His sternal incision is approximated.    /56 (BP Location: Left arm, Patient Position: Sitting, BP Cuff Size: Adult)   Pulse 63   Temp 36.6 °C (97.8 °F) (Temporal)   Ht 1.727 m (5' 8\")   Wt 109 kg (241 lb)   SpO2 97%     PHYSICAL EXAM:  Physical Exam   Cardiac: S1S2  Neuro:  AAO x 3  Resp:  diffuse wheezine  Wounds:  CDI  Sternum:  Stable      PLAN: CXR ordered. Patient instructed to follow up with PCP if cold symptoms do not improve.   Continue NOAC for 3 months or per your Cardiologist's recommendations.  We reviewed post operative sternal precautions, weight limits and driving precautions moving forward.  We reviewed SBE prophylaxis.      Overall, we are very pleased with the patient’s progress and we have instructed the patient to follow-up with us in the future should they have any concerns related to their surgery. Otherwise, we will see the patient on a PRN basis. The patient will continue to follow-up with their Cardiologist and PCP.  The patient has been informed that any further prescription refills should be done through their primary care physician and/or cardiologist.  They acknowledged understanding.  Thank you for allowing us to participate in the care of this very pleasant patient " and please let us know if there is any way we may be of further assistance.

## 2023-12-18 LAB
GLUCOSE BLD STRIP.AUTO-MCNC: 123 MG/DL (ref 65–99)
GLUCOSE BLD STRIP.AUTO-MCNC: 135 MG/DL (ref 65–99)
GLUCOSE BLD STRIP.AUTO-MCNC: 143 MG/DL (ref 65–99)
GLUCOSE BLD STRIP.AUTO-MCNC: 176 MG/DL (ref 65–99)
GLUCOSE BLD STRIP.AUTO-MCNC: 176 MG/DL (ref 65–99)
GLUCOSE BLD STRIP.AUTO-MCNC: 177 MG/DL (ref 65–99)

## 2023-12-19 NOTE — DOCUMENTATION QUERY
[facility]                                                                       Query Response Note      PATIENT:               AMBER GARNER  ACCT #:                  [id number]  MRN:                     [id number]  :                      1955  ADMIT DATE:       2023 11:48 PM  DISCH DATE:        2023 2:21 PM  RESPONDING  PROVIDER #:        [id number]           QUERY TEXT:    Based on the clinical indicators outlined above, please clarify if the following has been treated/evaluated during this hospitalization:    Thank you.    The patient's Clinical Indicators include:   Chest x-ray: Interstitial edema.   ECHO: EF 60%    Risk factors: HTN, CAD    Treatment: IV Lasix    Thank you,  Shelly Fischer NP, CCDS   Clinical   Connect via Gusto  Options provided:   -- Acute pulmonary edema-cardiac related, (please specify type and acuity of CHF, if applicable, or other cardiac condition)   -- Acute pulmonary edema- non cardiogenic   -- Other explanation, Please specify      Query created by: Shelly Fischer on 2023 11:59 AM    RESPONSE TEXT:    Other explanation- multifactorial pulmonary congestion due to perioperative status, IVF administration, post cardiopulmonary bypass inflammatory state but not limited to those causes          Electronically signed by:  LARRY MARIEE MD 2023 8:19 AM

## 2023-12-20 ENCOUNTER — TELEPHONE (OUTPATIENT)
Dept: CARDIOTHORACIC SURGERY | Facility: MEDICAL CENTER | Age: 68
End: 2023-12-20
Payer: MEDICARE

## 2023-12-20 NOTE — TELEPHONE ENCOUNTER
Hospital follow up call    He is showering everyday or every other day.    he states that all incisions are healing well and approximated with no s/s of infection (redness, puss, fever, purulent drainage, or tenderness). Saw his PCP yesterday who said incisions look good.    he states that the post op pain is well controlled. Narcotics are being utilized. Tylenol is  being utilized.    he is using the IS; volume is improved. Current volume is 2500 ml.    He is walking everyday, distance is increased from the hospital.    he is not obtaining daily weights. Weight was 248 lbs at his PCP yesterday. he is not wearing the compression/EVI hose. He denies LE edema.    he is taking all prescribed meds as directed.  he has no medication questions. States his PCP started him on losartan. He was told to hold if SBP < 100.    He is not taking vitals (HR and BP).     BP's: 130/80 at his PCP yesterday    He has had a bowel movement since discharge.    he has no additional questions at this time. Follow up education was not needed on anything. Follow up appointments were reviewed and I ensured they have my number if issues or concerns arise.      Follow up call time was 10 minutes.

## 2024-01-15 ENCOUNTER — OFFICE VISIT (OUTPATIENT)
Dept: CARDIOTHORACIC SURGERY | Facility: MEDICAL CENTER | Age: 69
End: 2024-01-15
Payer: MEDICARE

## 2024-01-15 ENCOUNTER — HOSPITAL ENCOUNTER (OUTPATIENT)
Dept: RADIOLOGY | Facility: MEDICAL CENTER | Age: 69
End: 2024-01-15
Attending: NURSE PRACTITIONER
Payer: MEDICARE

## 2024-01-15 VITALS
WEIGHT: 241 LBS | DIASTOLIC BLOOD PRESSURE: 56 MMHG | BODY MASS INDEX: 36.53 KG/M2 | SYSTOLIC BLOOD PRESSURE: 124 MMHG | TEMPERATURE: 97.8 F | HEART RATE: 63 BPM | HEIGHT: 68 IN | OXYGEN SATURATION: 97 %

## 2024-01-15 DIAGNOSIS — R06.02 SHORTNESS OF BREATH: ICD-10-CM

## 2024-01-15 PROCEDURE — 3078F DIAST BP <80 MM HG: CPT | Performed by: NURSE PRACTITIONER

## 2024-01-15 PROCEDURE — 71046 X-RAY EXAM CHEST 2 VIEWS: CPT

## 2024-01-15 PROCEDURE — 3074F SYST BP LT 130 MM HG: CPT | Performed by: NURSE PRACTITIONER

## 2024-01-15 PROCEDURE — 99024 POSTOP FOLLOW-UP VISIT: CPT | Performed by: NURSE PRACTITIONER

## 2024-01-15 ASSESSMENT — FIBROSIS 4 INDEX: FIB4 SCORE: 0.59

## 2024-01-19 ENCOUNTER — OFFICE VISIT (OUTPATIENT)
Dept: CARDIOLOGY | Facility: MEDICAL CENTER | Age: 69
End: 2024-01-19
Attending: NURSE PRACTITIONER
Payer: MEDICARE

## 2024-01-19 VITALS
HEART RATE: 62 BPM | DIASTOLIC BLOOD PRESSURE: 52 MMHG | RESPIRATION RATE: 15 BRPM | OXYGEN SATURATION: 96 % | SYSTOLIC BLOOD PRESSURE: 134 MMHG | BODY MASS INDEX: 35.61 KG/M2 | WEIGHT: 235 LBS | HEIGHT: 68 IN

## 2024-01-19 DIAGNOSIS — Z79.899 HIGH RISK MEDICATION USE: ICD-10-CM

## 2024-01-19 DIAGNOSIS — I10 ESSENTIAL HYPERTENSION: ICD-10-CM

## 2024-01-19 DIAGNOSIS — J45.20 MILD INTERMITTENT ASTHMA WITHOUT COMPLICATION: ICD-10-CM

## 2024-01-19 DIAGNOSIS — E78.5 DYSLIPIDEMIA: ICD-10-CM

## 2024-01-19 DIAGNOSIS — Z87.891 FORMER SMOKER: ICD-10-CM

## 2024-01-19 DIAGNOSIS — I25.10 CORONARY ARTERY DISEASE DUE TO LIPID RICH PLAQUE: ICD-10-CM

## 2024-01-19 DIAGNOSIS — I25.83 CORONARY ARTERY DISEASE DUE TO LIPID RICH PLAQUE: ICD-10-CM

## 2024-01-19 DIAGNOSIS — I21.4 NSTEMI (NON-ST ELEVATED MYOCARDIAL INFARCTION) (HCC): ICD-10-CM

## 2024-01-19 DIAGNOSIS — Z95.1 S/P CABG X 3: ICD-10-CM

## 2024-01-19 DIAGNOSIS — G47.33 OSA ON CPAP: ICD-10-CM

## 2024-01-19 PROBLEM — Z99.11 ON MECHANICALLY ASSISTED VENTILATION (HCC): Status: RESOLVED | Noted: 2023-12-08 | Resolved: 2024-01-19

## 2024-01-19 LAB — EKG IMPRESSION: NORMAL

## 2024-01-19 PROCEDURE — 3078F DIAST BP <80 MM HG: CPT | Performed by: NURSE PRACTITIONER

## 2024-01-19 PROCEDURE — 99213 OFFICE O/P EST LOW 20 MIN: CPT | Performed by: NURSE PRACTITIONER

## 2024-01-19 PROCEDURE — 3075F SYST BP GE 130 - 139MM HG: CPT | Performed by: NURSE PRACTITIONER

## 2024-01-19 PROCEDURE — 99214 OFFICE O/P EST MOD 30 MIN: CPT | Performed by: NURSE PRACTITIONER

## 2024-01-19 PROCEDURE — 93010 ELECTROCARDIOGRAM REPORT: CPT | Performed by: INTERNAL MEDICINE

## 2024-01-19 PROCEDURE — 93005 ELECTROCARDIOGRAM TRACING: CPT | Performed by: NURSE PRACTITIONER

## 2024-01-19 RX ORDER — LOSARTAN POTASSIUM 25 MG/1
25 TABLET ORAL DAILY
Qty: 30 TABLET | Refills: 11 | Status: SHIPPED | OUTPATIENT
Start: 2024-01-19

## 2024-01-19 ASSESSMENT — ENCOUNTER SYMPTOMS
ORTHOPNEA: 0
CLAUDICATION: 0
ABDOMINAL PAIN: 0
MYALGIAS: 0
SPUTUM PRODUCTION: 1
PALPITATIONS: 0
SHORTNESS OF BREATH: 1
DIZZINESS: 0
PND: 0
FEVER: 0
COUGH: 1

## 2024-01-19 ASSESSMENT — MINNESOTA LIVING WITH HEART FAILURE QUESTIONNAIRE (MLHF)
DIFFICULTY SLEEPING WELL AT NIGHT: 5
COSTING YOU MONEY FOR MEDICAL CARE: 0
MAKING YOU WORRY: 0
EATING LESS FOODS YOU LIKE: 1
GIVING YOU SIDE EFFECTS FROM TREATMENTS: 0
WALKING ABOUT OR CLIMBING STAIRS DIFFICULT: 4
DIFFICULTY WITH RECREATIONAL PASTIMES, SPORTS, HOBBIES: 5
SWELLING IN ANKLES OR LEGS: 0
HAVING TO SIT OR LIE DOWN DURING THE DAY: 4
MAKING YOU SHORT OF BREATH: 5
MAKING YOU STAY IN A HOSPITAL: 0
TOTAL_SCORE: 53
DIFFICULTY SOCIALIZING WITH FAMILY OR FRIENDS: 4
WORKING AROUND THE HOUSE OR YARD DIFFICULT: 5
LOSS OF SELF CONTROL IN YOUR LIFE: 0
DIFFICULTY TO CONCENTRATE OR REMEMBERING THINGS: 0
DIFFICULTY WITH SEXUAL ACTIVITIES: 5
TIRED, FATIGUED OR LOW ON ENERGY: 5
MAKING YOU FEEL DEPRESSED: 0
DIFFICULTY GOING AWAY FROM HOME: 5
DIFFICULTY WORKING TO EARN A LIVING: 5
FEELING LIKE A BURDEN TO FAMILY AND FRIENDS: 0

## 2024-01-19 ASSESSMENT — FIBROSIS 4 INDEX: FIB4 SCORE: 0.59

## 2024-01-19 NOTE — PROGRESS NOTES
Chief Complaint   Patient presents with    Other     F/V Dx: S/P CABG x 3  NSTEMI (non-ST elevated myocardial infarction) (HCC)       Subjective     Ediwn Hammond is a 68 y.o. male who presents today for follow-up after his recent hospitalization for NSTEMI, CAD, s/p CABG x 3.    New patient of the office.  He had a recent hospitalization from 12/5/2023 through 12/13/2023.  He presented with chest pain for 5 days.  Patient had reported chest pain that worsened with exertion and relieved by rest.  Cardiology was consulted and sent patient for left heart cath which showed multivessel disease.  CT surgery was consulted and recommended CABG.  Patient also had some associated chest congestion and productive cough.  Patient had CABG x 3 on 12/8/2023, did develop atrial fibrillation, started on amiodarone and anticoagulation.  He was discharged home.    Patient did see CT surgery on 1/15/2024.  Patient was sent for chest x-ray for his cold symptoms.    Patient reports he has been doing okay, he does report having shortness of breath due to his asthma.  He has some wheezing.  Patient also continues to have his chest cold, but slowly is getting better.  He is coughing up clear sputum at this time.    Aside from discomfort from coughing and some chest soreness.  He denies chest pain, palpitations, orthopnea, PND, edema or dizziness/lightheadedness.    His home weights have been ranging between 232 to 235 pounds.    He reports he has not been using his CPAP since he has been home from the hospital.    -Patient is planning on getting back into an exercise routine starting Monday now that he has been cleared from CT surgery.  He was recommended not to go back to work for about 2 to 3 months.  He is a  and does lift a bit.  He is not able to participate in cardiac rehab as he lives [place], [place].    Additionally, patient has the following medical problems:     -Hypertension    -Hyperlipidemia    -Anxiety  and depression: Taking citalopram    -Asthma: Using inhalers    -Sleep apnea: Using CPAP    Past Medical History:   Diagnosis Date    Asthma     CAD (coronary artery disease)     Chickenpox     [place] measles     Hyperlipidemia     Hypertension     Influenza     Mumps     Sleep apnea      Past Surgical History:   Procedure Laterality Date    MULTIPLE CORONARY ARTERY BYPASS ENDO VEIN HARVEST  2023    Procedure: CABG X 3, WITH ENDOSCOPIC VEIN PROCUREMENT;  Surgeon: Carolyn Davalos M.D.;  Location: SURGERY [facility];  Service: Cardiac Robotic    ECHOCARDIOGRAM, TRANSESOPHAGEAL, INTRAOPERATIVE  2023    Procedure: ECHOCARDIOGRAM, TRANSESOPHAGEAL, INTRAOPERATIVE;  Surgeon: Carolyn Davalos M.D.;  Location: SURGERY [facility];  Service: Cardiac Robotic    OTHER  [id number]    Mastoidectomy left ear     Family History   Problem Relation Age of Onset    Sleep Apnea Brother      Social History     Socioeconomic History    Marital status:      Spouse name: Not on file    Number of children: Not on file    Years of education: Not on file    Highest education level: Not on file   Occupational History    Not on file   Tobacco Use    Smoking status: Former     Current packs/day: 0.00     Average packs/day: 1 pack/day for 30.0 years (30.0 ttl pk-yrs)     Types: Cigarettes     Start date: 1971     Quit date: 2001     Years since quittin.0    Smokeless tobacco: Never   Vaping Use    Vaping Use: Never used   Substance and Sexual Activity    Alcohol use: No    Drug use: No    Sexual activity: Not on file   Other Topics Concern    Not on file   Social History Narrative    Not on file     Social Determinants of Health     Financial Resource Strain: Not on file   Food Insecurity: Not on file   Transportation Needs: Not on file   Physical Activity: Not on file   Stress: Not on file   Social Connections: Not on file   Intimate Partner Violence: Not on file   Housing Stability: Not on file     No Known  Allergies  Outpatient Encounter Medications as of 1/19/2024   Medication Sig Dispense Refill    losartan (COZAAR) 25 MG Tab Take 1 Tablet by mouth every day. 30 Tablet 11    acetaminophen (TYLENOL) 500 MG Tab Take 2 Tablets by mouth every 6 hours. 30 Tablet 0    amiodarone (PACERONE) 400 MG tablet Take 2 Tablets twice daily x 7 days, Then Take Two Tablets daily for 7 days, Then Take 1 Tablet Daily. 56 Tablet 1    aspirin 81 MG EC tablet Take 1 Tablet by mouth every day. 90 Tablet 2    atorvastatin (LIPITOR) 80 MG tablet Take 1 Tablet by mouth every evening. 30 Tablet 2    apixaban (ELIQUIS) 5 MG Tab tablet Take 1 Tablet by mouth 2 times a day. 60 Tablet 2    furosemide (LASIX) 20 MG Tab Take 1 Tablet by mouth every day. 30 Tablet 0    potassium chloride SA (K-DUR) 10 MEQ Tab CR Take 2 Tablets by mouth every day. 60 Tablet 0    metoprolol tartrate (LOPRESSOR) 25 MG Tab Take 1 Tablet by mouth 2 times a day. 60 Tablet 2    Citalopram Hydrobromide 30 MG Cap Take 30 mg by mouth every day.      TESTOSTERONE CYPIONATE IM Inject 1 Each into the shoulder, thigh, or buttocks every 7 days. Indications: for low testosterone      amLODIPine (NORVASC) 5 MG Tab Take 10 mg by mouth every day.      Albuterol Sulfate 108 (90 BASE) MCG/ACT AEROSOL POWDER, BREATH ACTIVATED Inhale 2 Puffs 2 times a day as needed.      mometasone (ASMANEX) 220 MCG/INH inhaler Inhale 2 Puffs by mouth every day.       No facility-administered encounter medications on file as of 1/19/2024.     Review of Systems   Constitutional:  Negative for fever and malaise/fatigue.   Respiratory:  Positive for cough, sputum production (clear) and shortness of breath.    Cardiovascular:  Positive for chest pain (Chest soreness). Negative for palpitations, orthopnea, claudication, leg swelling and PND.   Gastrointestinal:  Negative for abdominal pain.   Musculoskeletal:  Negative for myalgias.   Neurological:  Negative for dizziness.   All other systems reviewed and are  "negative.             Objective     /52 (BP Location: Left arm, Patient Position: Sitting, BP Cuff Size: Adult)   Pulse 62   Resp 15   Ht 1.727 m (5' 8\")   Wt 107 kg (235 lb)   SpO2 96%   BMI 35.73 kg/m²     Physical Exam  Vitals reviewed.   Constitutional:       Appearance: He is well-developed.   HENT:      Head: Normocephalic and atraumatic.   Eyes:      Pupils: Pupils are equal, round, and reactive to light.   Neck:      Vascular: No JVD.   Cardiovascular:      Rate and Rhythm: Normal rate and regular rhythm.      Heart sounds: Normal heart sounds.   Pulmonary:      Effort: Pulmonary effort is normal. No respiratory distress.      Breath sounds: Normal breath sounds. No wheezing or rales.   Abdominal:      General: Bowel sounds are normal.      Palpations: Abdomen is soft.   Musculoskeletal:         General: Normal range of motion.      Cervical back: Normal range of motion and neck supple.      Right lower leg: No edema.      Left lower leg: No edema.   Skin:     General: Skin is warm and dry.   Neurological:      General: No focal deficit present.      Mental Status: He is alert and oriented to person, place, and time.   Psychiatric:         Behavior: Behavior normal.       Lab Results   Component Value Date/Time    CHOLSTRLTOT 195 12/05/2023 11:33 PM     (H) 12/05/2023 11:33 PM    HDL 42 12/05/2023 11:33 PM    TRIGLYCERIDE 159 (H) 12/05/2023 11:33 PM       Lab Results   Component Value Date/Time    SODIUM 133 (L) 12/13/2023 01:00 AM    POTASSIUM 4.4 12/13/2023 01:00 AM    CHLORIDE 99 12/13/2023 01:00 AM    CO2 26 12/13/2023 01:00 AM    GLUCOSE 99 12/13/2023 01:00 AM    BUN 16 12/13/2023 01:00 AM    CREATININE 1.02 12/13/2023 01:00 AM     Lab Results   Component Value Date/Time    ALKPHOSPHAT 66 12/07/2023 12:59 PM    ASTSGOT 11 (L) 12/07/2023 12:59 PM    ALTSGPT 15 12/07/2023 12:59 PM    TBILIRUBIN 0.3 12/07/2023 12:59 PM      Cardiac cath 12/6/2023  HEMODYNAMICS:  Aortic pressure: 119 /83 " mmHg  LVEDP: 25 mmHg  No significant aortic gradient on pullback     Left ventriculography:  Normal estimated LV systolic function, estimated LVEF 65%     CORONARY ANGIOGRAPHY:  The left main coronary artery : Large in caliber vessel with mild CAD, bifurcates to LAD and left circumflex  The left anterior descending coronary artery : Large in caliber transapical vessel with severe 95% mid stenosis.  Gives rise to several diagonal branches  The left circumflex coronary artery : Large in caliber vessel that gives rise to small OM1 and large OM 2.  There is severe 95% stenoses throughout the proximal OM 2.  The right coronary artery  : Large-caliber dominant vessel with severe 70% mid stenosis, and a long segment of severe 80-90% distal stenosis, with EARLE I flow into the R-PDA with occluded PLB system, fills via left-right collaterals.     IMPRESSION:  1.  Severe multivessel epicardial CAD  2.  Normal estimated LVEF 65%  3.  Elevated resting LVEDP 25mmHg with no significant transaortic gradient on pullback        RECOMMENDATIONS:  Appreciate CT surgical consultation for CABG consideration.  Patient chest pain-free  Resume heparin drip ACS protocol and ongoing optimal secondary ASCVD preventive measures  TR band protocol     NOTIFICATION:  The patient opted no need for me to update any family or friends at this time.      Transthoracic Echo Report 12/7/2023  No prior study is available for comparison.   Mild concentric left ventricular hypertrophy.  Normal left ventricular systolic function.  The left ventricular ejection fraction is visually estimated to be 55%.  Normal right ventricular size and systolic function.  No significant valvular abnormalities.     CABG x 3 on 12/8/2023  PRE-OP DIAGNOSIS: multivessel CAD with NSTEMI this admission, HTN, HLD, morbid obesity, signficant cigarette smoking history, recent bronchitis symptoms, DEREK, asthma, daily marijuana smoker            POST-OP DIAGNOSIS: same             PROCEDURE:Coronary artery bypass grafting x3  Left internal mammary artery to left anterior descending artery  Reversed saphenous vein graft to obtuse marginal artery #3  Reversed saphenous vein graft to distal right coronary artery  Right coronary artery endarterectomy  Endo-vein procurement           Transesophageal Echo Report 12/8/2023  Normal LV function with EF 60%.  Valves are normal.          Assessment & Plan     1. S/P CABG x 3  EKG    losartan (COZAAR) 25 MG Tab    Basic Metabolic Panel      2. Coronary artery disease due to lipid rich plaque        3. NSTEMI (non-ST elevated myocardial infarction) ([facility])        4. Essential hypertension  losartan (COZAAR) 25 MG Tab    Basic Metabolic Panel      5. DEREK        6. Dyslipidemia        7. Mild intermittent asthma without complication        8. High risk medication use        9. Former smoker            Medical Decision Making: Today's Assessment/Status/Plan:        NSTEMI, CAD, s/p CABG x3 on 12/8/2023:  -Continue aspirin 81 mg daily, he is also on Eliquis 5 mg twice a day  -Continue metoprolol tartrate 25 mg twice a day  -Continue atorvastatin 80 mg daily  -Complete course of furosemide 20 mg daily and potassium 20 mEq daily  -Patient not able to participate in cardiac rehab  -He is a former smoker, continue smoking cessation    Postoperative A-fib:  -EKG today shows sinus rhythm 60  -Patient currently is taking amlodipine 400 mg daily  -Continue Eliquis 5 mg twice a day  -Continue metoprolol tartrate 25 mg twice a day    Hypertension: Stable, but borderline  -BP goal <120/80  -Patient reports losartan was discontinued for unclear reasons, will restart losartan 25 mg daily  -BMP in 1 to 2 weeks  -Continue amlodipine 10 mg daily  -Continue metoprolol tartrate 25 mg twice a day    Dyslipidemia:  -Last lipid panel,  on 12/5/2023, LDL goal < 70   -will follow lipid panel     DEREK:  -Recommend patient to restart CPAP    Asthma:  -Patient to follow-up with  PCP    Cold:  -Patient to follow-up with PCP if his cold symptoms do not improve soon  -Reviewed results of recent chest x-ray    FU in clinic in 2 months with Dr. Gray or general cardiology. Sooner if needed.    Patient verbalizes understanding and agrees with the plan of care.     PLEASE NOTE: This Note was created using voice recognition Software. I have made every reasonable attempt to correct obvious errors, but I expect that there are errors of grammar and possibly content that I did not discover before finalizing the note

## 2024-02-05 ENCOUNTER — TELEPHONE (OUTPATIENT)
Dept: CARDIOTHORACIC SURGERY | Facility: MEDICAL CENTER | Age: 69
End: 2024-02-05
Payer: MEDICARE

## 2024-02-05 NOTE — TELEPHONE ENCOUNTER
Rash on his chest and back, and some muscle soreness starting a week ago. States the rash is going away, and he thinks it could be his mattress. He was told to consult his local pharmacist for a cream for his rash, or his PCP if it comes back.    He was told to restart the tylenol for pain and low dose ibuprofen for a few days, and to not take it longer than that.    We also reviewed his post op restrictions.    Call time 6 minutes

## 2024-02-06 ENCOUNTER — TELEPHONE (OUTPATIENT)
Dept: CARDIOTHORACIC SURGERY | Facility: MEDICAL CENTER | Age: 69
End: 2024-02-06
Payer: MEDICARE

## 2024-02-07 NOTE — TELEPHONE ENCOUNTER
Received VM from patient that his rash was confirmed as a shingles outbreak.  I called back and left a VM to call his PCP for mgmt and a shingles vaccine if appropriate.

## 2024-03-25 ENCOUNTER — OFFICE VISIT (OUTPATIENT)
Dept: CARDIOLOGY | Facility: MEDICAL CENTER | Age: 69
End: 2024-03-25
Attending: INTERNAL MEDICINE
Payer: MEDICARE

## 2024-03-25 VITALS
RESPIRATION RATE: 12 BRPM | OXYGEN SATURATION: 95 % | WEIGHT: 240 LBS | HEART RATE: 56 BPM | SYSTOLIC BLOOD PRESSURE: 110 MMHG | HEIGHT: 68 IN | DIASTOLIC BLOOD PRESSURE: 70 MMHG | BODY MASS INDEX: 36.37 KG/M2

## 2024-03-25 DIAGNOSIS — I25.10 CORONARY ARTERY DISEASE, UNSPECIFIED VESSEL OR LESION TYPE, UNSPECIFIED WHETHER ANGINA PRESENT, UNSPECIFIED WHETHER NATIVE OR TRANSPLANTED HEART: ICD-10-CM

## 2024-03-25 DIAGNOSIS — I10 ESSENTIAL HYPERTENSION: ICD-10-CM

## 2024-03-25 DIAGNOSIS — Z95.1 S/P CABG X 3: ICD-10-CM

## 2024-03-25 DIAGNOSIS — I48.0 PAROXYSMAL ATRIAL FIBRILLATION (HCC): ICD-10-CM

## 2024-03-25 DIAGNOSIS — E78.5 DYSLIPIDEMIA: ICD-10-CM

## 2024-03-25 LAB — EKG IMPRESSION: NORMAL

## 2024-03-25 PROCEDURE — 3078F DIAST BP <80 MM HG: CPT | Performed by: INTERNAL MEDICINE

## 2024-03-25 PROCEDURE — 93005 ELECTROCARDIOGRAM TRACING: CPT | Performed by: INTERNAL MEDICINE

## 2024-03-25 PROCEDURE — 93010 ELECTROCARDIOGRAM REPORT: CPT | Performed by: INTERNAL MEDICINE

## 2024-03-25 PROCEDURE — 3074F SYST BP LT 130 MM HG: CPT | Performed by: INTERNAL MEDICINE

## 2024-03-25 PROCEDURE — 99214 OFFICE O/P EST MOD 30 MIN: CPT | Performed by: INTERNAL MEDICINE

## 2024-03-25 PROCEDURE — 99203 OFFICE O/P NEW LOW 30 MIN: CPT | Performed by: INTERNAL MEDICINE

## 2024-03-25 PROCEDURE — 99213 OFFICE O/P EST LOW 20 MIN: CPT | Performed by: INTERNAL MEDICINE

## 2024-03-25 ASSESSMENT — ENCOUNTER SYMPTOMS
DECREASED APPETITE: 0
PND: 0
PALPITATIONS: 0
ORTHOPNEA: 0
DIARRHEA: 0
ABDOMINAL PAIN: 0
CONSTIPATION: 0
SYNCOPE: 0
NEAR-SYNCOPE: 0
FEVER: 0
VOMITING: 0
SHORTNESS OF BREATH: 0
ALTERED MENTAL STATUS: 0
DIZZINESS: 0
BACK PAIN: 0
DYSPNEA ON EXERTION: 0
CLAUDICATION: 0
NAUSEA: 0
HEARTBURN: 0
IRREGULAR HEARTBEAT: 0
COUGH: 0
DEPRESSION: 0
FLANK PAIN: 0
WEIGHT LOSS: 0
WEIGHT GAIN: 0
BLURRED VISION: 0

## 2024-03-25 ASSESSMENT — FIBROSIS 4 INDEX: FIB4 SCORE: 0.59

## 2024-03-25 NOTE — PROGRESS NOTES
Cardiology Note    Chief Complaint   Patient presents with    Coronary Artery Bypass Graft (CABG)     F/V Dx: S/P CABG x 3      Hypertension     F/V Dx: Essential hypertension    Coronary Artery Disease       History of Present Illness: Edwin Hammond is a 68 y.o. male PMH CAD/ NSTEMI, s/p CABG c/b post op AF, HTN, HLD, DEREK / cpap who presents for follow up visit.     Feels well overall. No cardiac complaints. Had episodes of shingles but has recovered. Still has nerve pain on which working with his primary care. Compliant with medications and denies adverse effects. Lives far away without access to cardiac rehab. Works in sanitation and wanting to resume work.     Review of Systems   Constitutional: Negative for decreased appetite, fever, malaise/fatigue, weight gain and weight loss.   HENT:  Negative for congestion and nosebleeds.    Eyes:  Negative for blurred vision.   Cardiovascular:  Negative for chest pain, claudication, dyspnea on exertion, irregular heartbeat, leg swelling, near-syncope, orthopnea, palpitations, paroxysmal nocturnal dyspnea and syncope.   Respiratory:  Negative for cough and shortness of breath.    Endocrine: Negative for cold intolerance and heat intolerance.   Skin:  Negative for rash.   Musculoskeletal:  Negative for back pain.   Gastrointestinal:  Negative for abdominal pain, constipation, diarrhea, heartburn, melena, nausea and vomiting.   Genitourinary:  Negative for dysuria, flank pain and hematuria.   Neurological:  Negative for dizziness.   Psychiatric/Behavioral:  Negative for altered mental status and depression.          Past Medical History:   Diagnosis Date    Asthma     CAD (coronary artery disease)     Chickenpox     [place] measles     Hyperlipidemia     Hypertension     Influenza     Mumps     Sleep apnea          Past Surgical History:   Procedure Laterality Date    MULTIPLE CORONARY ARTERY BYPASS ENDO VEIN HARVEST  12/8/2023    Procedure: CABG X 3, WITH ENDOSCOPIC VEIN  PROCUREMENT;  Surgeon: Carolyn Davalos M.D.;  Location: SURGERY [facility];  Service: Cardiac Robotic    ECHOCARDIOGRAM, TRANSESOPHAGEAL, INTRAOPERATIVE  12/8/2023    Procedure: ECHOCARDIOGRAM, TRANSESOPHAGEAL, INTRAOPERATIVE;  Surgeon: Carolyn Davalos M.D.;  Location: SURGERY [facility];  Service: Cardiac Robotic    OTHER  [id number]    Mastoidectomy left ear         Current Outpatient Medications   Medication Sig Dispense Refill    losartan (COZAAR) 25 MG Tab Take 1 Tablet by mouth every day. 30 Tablet 11    acetaminophen (TYLENOL) 500 MG Tab Take 2 Tablets by mouth every 6 hours. 30 Tablet 0    aspirin 81 MG EC tablet Take 1 Tablet by mouth every day. 90 Tablet 2    atorvastatin (LIPITOR) 80 MG tablet Take 1 Tablet by mouth every evening. 30 Tablet 2    apixaban (ELIQUIS) 5 MG Tab tablet Take 1 Tablet by mouth 2 times a day. 60 Tablet 2    metoprolol tartrate (LOPRESSOR) 25 MG Tab Take 1 Tablet by mouth 2 times a day. 60 Tablet 2    Citalopram Hydrobromide 30 MG Cap Take 30 mg by mouth every day.      TESTOSTERONE CYPIONATE IM Inject 1 Each into the shoulder, thigh, or buttocks every 7 days. Indications: for low testosterone      amLODIPine (NORVASC) 5 MG Tab Take 10 mg by mouth every day.      Albuterol Sulfate 108 (90 BASE) MCG/ACT AEROSOL POWDER, BREATH ACTIVATED Inhale 2 Puffs 2 times a day as needed.      mometasone (ASMANEX) 220 MCG/INH inhaler Inhale 2 Puffs by mouth every day.       No current facility-administered medications for this visit.         No Known Allergies      Family History   Problem Relation Age of Onset    Sleep Apnea Brother          Social History     Socioeconomic History    Marital status:      Spouse name: Not on file    Number of children: Not on file    Years of education: Not on file    Highest education level: Not on file   Occupational History    Not on file   Tobacco Use    Smoking status: Former     Current packs/day: 0.00     Average packs/day: 1 pack/day for 30.0  "years (30.0 ttl pk-yrs)     Types: Cigarettes     Start date: 1971     Quit date: 2001     Years since quittin.2    Smokeless tobacco: Never   Vaping Use    Vaping Use: Never used   Substance and Sexual Activity    Alcohol use: No    Drug use: No    Sexual activity: Not on file   Other Topics Concern    Not on file   Social History Narrative    Not on file     Social Determinants of Health     Financial Resource Strain: Not on file   Food Insecurity: Not on file   Transportation Needs: Not on file   Physical Activity: Not on file   Stress: Not on file   Social Connections: Not on file   Intimate Partner Violence: Not on file   Housing Stability: Not on file         Physical Exam:  Ambulatory Vitals  /70 (BP Location: Left arm, Patient Position: Sitting, BP Cuff Size: Adult)   Pulse (!) 56   Resp 12   Ht 1.727 m (5' 8\")   Wt 109 kg (240 lb)   SpO2 95%    BP Readings from Last 4 Encounters:   24 110/70   24 134/52   01/15/24 124/56   23 129/69     Weight/BMI:   Vitals:    24 1025   BP: 110/70   Weight: 109 kg (240 lb)   Height: 1.727 m (5' 8\")    Body mass index is 36.49 kg/m².  Wt Readings from Last 4 Encounters:   24 109 kg (240 lb)   24 107 kg (235 lb)   01/15/24 109 kg (241 lb)   23 113 kg (249 lb 12.5 oz)       Physical Exam  Constitutional:       General: He is not in acute distress.  HENT:      Head: Normocephalic and atraumatic.   Eyes:      Conjunctiva/sclera: Conjunctivae normal.      Pupils: Pupils are equal, round, and reactive to light.   Neck:      Vascular: No JVD.   Cardiovascular:      Rate and Rhythm: Normal rate and regular rhythm.      Heart sounds: Normal heart sounds. No murmur heard.     No friction rub. No gallop.   Pulmonary:      Effort: Pulmonary effort is normal. No respiratory distress.      Breath sounds: Normal breath sounds. No wheezing or rales.   Chest:      Chest wall: No tenderness.   Abdominal:      General: Bowel " "sounds are normal. There is no distension.      Palpations: Abdomen is soft.   Musculoskeletal:      Cervical back: Normal range of motion and neck supple.   Skin:     General: Skin is warm and dry.   Neurological:      Mental Status: He is alert and oriented to person, place, and time.   Psychiatric:         Mood and Affect: Affect normal.         Judgment: Judgment normal.         Lab Data Review:  Lab Results   Component Value Date/Time    CHOLSTRLTOT 195 12/05/2023 11:33 PM     (H) 12/05/2023 11:33 PM    HDL 42 12/05/2023 11:33 PM    TRIGLYCERIDE 159 (H) 12/05/2023 11:33 PM       Lab Results   Component Value Date/Time    SODIUM 133 (L) 12/13/2023 01:00 AM    POTASSIUM 4.4 12/13/2023 01:00 AM    CHLORIDE 99 12/13/2023 01:00 AM    CO2 26 12/13/2023 01:00 AM    GLUCOSE 99 12/13/2023 01:00 AM    BUN 16 12/13/2023 01:00 AM    CREATININE 1.02 12/13/2023 01:00 AM     CrCl cannot be calculated (Patient's most recent lab result is older than the maximum 7 days allowed.).  Lab Results   Component Value Date/Time    ALKPHOSPHAT 66 12/07/2023 12:59 PM    ASTSGOT 11 (L) 12/07/2023 12:59 PM    ALTSGPT 15 12/07/2023 12:59 PM    TBILIRUBIN 0.3 12/07/2023 12:59 PM      Lab Results   Component Value Date/Time    WBC 12.0 (H) 12/13/2023 01:00 AM     Lab Results   Component Value Date/Time    HBA1C 5.6 12/06/2023 05:34 AM     No components found for: \"TROP\"      Cardiac Imaging and Procedures Review:      EKG 3/25/24 interpreted by me sinus peter 56 bpm    12/8/23 Dr Davalos CABG  PROCEDURE:Coronary artery bypass grafting x3  Left internal mammary artery to left anterior descending artery  Reversed saphenous vein graft to obtuse marginal artery #3  Reversed saphenous vein graft to distal right coronary artery  Right coronary artery endarterectomy  Endo-vein procurement    Medical Decision Making:  Problem List Items Addressed This Visit       Dyslipidemia    Relevant Orders    Lipid Profile    Essential hypertension    " Coronary artery disease    Relevant Orders    EKG    Cardiac Event Monitor    Lipid Profile    S/P CABG x 3    Relevant Orders    Cardiac Event Monitor    Paroxysmal atrial fibrillation (HCC)     Post op AF - attempt d/c amiodarone. Continue doac for now. Check event monitor in one month.    CAD / CABG - continue aspirin and doac 12 months dapt equivalent. Continue statin, BB, ARB. Repeat lipids. Resume light duty at work three months then without restrictions if remains stable.     HLD - threshold goal LDL <70 and trig <150. Continue statin. Repeat lipids.     HTN - goal <130/80. Controlled. Continue regimen.     It was my pleasure to meet with Mr. Hammond.

## 2024-03-27 ENCOUNTER — TELEPHONE (OUTPATIENT)
Dept: CARDIOLOGY | Facility: MEDICAL CENTER | Age: 69
End: 2024-03-27
Payer: MEDICARE

## 2024-03-27 NOTE — TELEPHONE ENCOUNTER
VR    Caller: Liliana Canela Nurse    Topic/issue: Would like to discuss medications for Edwin, as he thought VR has discontinued one of them.     Callback Number: [phone]    Thank you,   Rebeca FRANCISCO

## 2024-03-27 NOTE — TELEPHONE ENCOUNTER
Phone Number Called: [phone]    Call outcome:  Spoke to Liliana with [facility]    Message: Called to follow up with Liliana about mediation questions. Liliana states patient thinks that VR recommended D/C of amiodarone. Confirmed that VR did recommend D/C amiodarone. Liliana would like note faxed to [phone]. Note Faxed

## 2024-03-28 NOTE — TELEPHONE ENCOUNTER
Phone Number Called: [phone]    Call outcome:  Spoke to Liliana    Message: Call returned to Liliana regarding potassium and furosemide discontinuation. Chart review confirms VR D/C'd medications. Asking for faxed proof of discontinuation. Discontinuation orders faxed to [phone]

## 2024-03-28 NOTE — TELEPHONE ENCOUNTER
VR    Caller: Liliana from [facility]    Topic/issue: Liliana called inquiring about 2 of the patients medications being discontinued and if it was correct. (furosemide (LASIX) 20 MG Tab and potassium chloride SA (K-DUR) 10 MEQ Tab CR)    Please advise.     Callback Number: see routing comments     Thank you,     Josee SAMUELS

## 2024-04-26 ENCOUNTER — NON-PROVIDER VISIT (OUTPATIENT)
Dept: CARDIOLOGY | Facility: MEDICAL CENTER | Age: 69
End: 2024-04-26
Attending: INTERNAL MEDICINE
Payer: MEDICARE

## 2024-04-26 DIAGNOSIS — Z95.1 S/P CABG X 3: ICD-10-CM

## 2024-04-26 DIAGNOSIS — I25.10 CORONARY ARTERY DISEASE, UNSPECIFIED VESSEL OR LESION TYPE, UNSPECIFIED WHETHER ANGINA PRESENT, UNSPECIFIED WHETHER NATIVE OR TRANSPLANTED HEART: ICD-10-CM

## 2024-04-26 PROCEDURE — 93270 REMOTE 30 DAY ECG REV/REPORT: CPT

## 2024-04-26 NOTE — PROGRESS NOTES
Patient enrolled in the 30 day [employer] Heart monitoring program per Terell MD Isaac>.  >Office hook-up with [facility] transmitted, serial # [id number].  >Pending EOS,

## 2024-05-31 ENCOUNTER — TELEPHONE (OUTPATIENT)
Dept: CARDIOLOGY | Facility: MEDICAL CENTER | Age: 69
End: 2024-05-31
Payer: MEDICARE

## 2024-05-31 NOTE — TELEPHONE ENCOUNTER
[employer] EOS to VR's nurse, Sienna, on 5/31/2024  Preliminary Findings:  Sinus rhythm,  with an avg rate of 61bpm  10 patient events corresponding to:  SR 50-91

## 2024-09-17 ENCOUNTER — TELEPHONE (OUTPATIENT)
Dept: CARDIOLOGY | Facility: MEDICAL CENTER | Age: 69
End: 2024-09-17
Payer: MEDICARE

## 2024-09-17 NOTE — TELEPHONE ENCOUNTER
Unable to LVM due to pt's full voicemail box. Was calling pt in regards to their next appt with VR and asking if they were planning on getting their labs done prior to the visit.

## 2024-10-08 ENCOUNTER — OFFICE VISIT (OUTPATIENT)
Dept: CARDIOLOGY | Facility: MEDICAL CENTER | Age: 69
End: 2024-10-08
Attending: PHYSICIAN ASSISTANT
Payer: MEDICARE

## 2024-10-08 ENCOUNTER — TELEPHONE (OUTPATIENT)
Dept: CARDIOLOGY | Facility: MEDICAL CENTER | Age: 69
End: 2024-10-08

## 2024-10-08 VITALS
BODY MASS INDEX: 34.56 KG/M2 | OXYGEN SATURATION: 98 % | RESPIRATION RATE: 16 BRPM | HEART RATE: 65 BPM | HEIGHT: 68 IN | WEIGHT: 228 LBS | DIASTOLIC BLOOD PRESSURE: 52 MMHG | SYSTOLIC BLOOD PRESSURE: 110 MMHG

## 2024-10-08 DIAGNOSIS — I48.0 HYPERCOAGULABLE STATE DUE TO PAROXYSMAL ATRIAL FIBRILLATION (HCC): ICD-10-CM

## 2024-10-08 DIAGNOSIS — I25.118 CORONARY ARTERY DISEASE OF NATIVE ARTERY OF NATIVE HEART WITH STABLE ANGINA PECTORIS (HCC): ICD-10-CM

## 2024-10-08 DIAGNOSIS — D68.69 HYPERCOAGULABLE STATE DUE TO PAROXYSMAL ATRIAL FIBRILLATION (HCC): ICD-10-CM

## 2024-10-08 DIAGNOSIS — Z95.1 S/P CABG X 3: ICD-10-CM

## 2024-10-08 DIAGNOSIS — I10 ESSENTIAL HYPERTENSION: ICD-10-CM

## 2024-10-08 DIAGNOSIS — I48.0 PAROXYSMAL ATRIAL FIBRILLATION (HCC): ICD-10-CM

## 2024-10-08 DIAGNOSIS — G47.33 OSA ON CPAP: ICD-10-CM

## 2024-10-08 PROCEDURE — 3078F DIAST BP <80 MM HG: CPT | Performed by: PHYSICIAN ASSISTANT

## 2024-10-08 PROCEDURE — 99213 OFFICE O/P EST LOW 20 MIN: CPT | Performed by: PHYSICIAN ASSISTANT

## 2024-10-08 PROCEDURE — 3074F SYST BP LT 130 MM HG: CPT | Performed by: PHYSICIAN ASSISTANT

## 2024-10-08 PROCEDURE — 99214 OFFICE O/P EST MOD 30 MIN: CPT | Performed by: PHYSICIAN ASSISTANT

## 2024-10-08 RX ORDER — METOPROLOL SUCCINATE 50 MG/1
50 TABLET, EXTENDED RELEASE ORAL EVERY EVENING
Qty: 180 TABLET | Refills: 3 | Status: SHIPPED | OUTPATIENT
Start: 2024-10-08

## 2024-10-08 RX ORDER — NITROGLYCERIN 0.4 MG/1
0.4 TABLET SUBLINGUAL PRN
Qty: 25 TABLET | Refills: 3 | Status: SHIPPED | OUTPATIENT
Start: 2024-10-08 | End: 2024-10-10 | Stop reason: SDUPTHER

## 2024-10-08 ASSESSMENT — ENCOUNTER SYMPTOMS
DEPRESSION: 1
NERVOUS/ANXIOUS: 1
DIZZINESS: 0
COUGH: 0
ORTHOPNEA: 0
NAUSEA: 0
PALPITATIONS: 0
DIAPHORESIS: 1
PND: 0
SHORTNESS OF BREATH: 0
LOSS OF CONSCIOUSNESS: 0

## 2024-10-08 ASSESSMENT — FIBROSIS 4 INDEX: FIB4 SCORE: 0.59

## 2024-10-10 RX ORDER — NITROGLYCERIN 0.4 MG/1
0.4 TABLET SUBLINGUAL PRN
Qty: 100 TABLET | Refills: 0 | Status: SHIPPED | OUTPATIENT
Start: 2024-10-10

## 2024-12-30 ENCOUNTER — TELEPHONE (OUTPATIENT)
Dept: HEALTH INFORMATION MANAGEMENT | Facility: OTHER | Age: 69
End: 2024-12-30
Payer: MEDICARE

## 2025-03-18 ENCOUNTER — TELEPHONE (OUTPATIENT)
Dept: CARDIOLOGY | Facility: MEDICAL CENTER | Age: 70
End: 2025-03-18
Payer: MEDICARE

## 2025-03-18 DIAGNOSIS — I48.0 PAROXYSMAL A-FIB (HCC): ICD-10-CM

## 2025-03-18 DIAGNOSIS — I10 ESSENTIAL HYPERTENSION: ICD-10-CM

## 2025-03-18 DIAGNOSIS — Z95.1 S/P CABG X 3: ICD-10-CM

## 2025-03-18 RX ORDER — LOSARTAN POTASSIUM 25 MG/1
25 TABLET ORAL DAILY
Qty: 90 TABLET | Refills: 3 | Status: SHIPPED | OUTPATIENT
Start: 2025-03-18

## 2025-03-18 NOTE — TELEPHONE ENCOUNTER
TORIBIO    Caller: Tiffanie with [facility]    Topic/issue: Pharmacy had been sending request to wrong fax number, provided correct one today.  Asking that we send thru e-prescription.    Medication:   Losartan 25 mg  Eliquis 5 mg    Patient is currently out of both medications.    Callback Number: [phone]  Fax: [phone]   Escript: [id number]    Thank you,  Roberta NEELY

## 2025-03-18 NOTE — TELEPHONE ENCOUNTER
Eliquis last ordered 12/2023 for 3 month supply (at discharge post CABG)    Losartan not addressed in last 2 FV notes.     Attempted to call pt, and pt's daughter, to verify that he has been taking Eliquis. Both numbers are no longer in service.     Eliquis and Losartan refills to [facility] for review and approval if appropriate. Thank you!

## 2025-03-18 NOTE — TELEPHONE ENCOUNTER
Noted:   Anjana Schwarz P.A.-C.  You8 minutes ago (10:32 AM)       Yes, ok to refill both. Please follow up with [facility] this year     To schedulers, please attempt to contact pt and schedule [facility] with [facility]. Thank you!

## 2025-04-28 ENCOUNTER — TELEPHONE (OUTPATIENT)
Dept: HEALTH INFORMATION MANAGEMENT | Facility: OTHER | Age: 70
End: 2025-04-28
Payer: MEDICARE

## 2025-04-30 ENCOUNTER — TELEPHONE (OUTPATIENT)
Dept: CARDIOLOGY | Facility: MEDICAL CENTER | Age: 70
End: 2025-04-30
Payer: MEDICARE

## 2025-04-30 ENCOUNTER — TELEPHONE (OUTPATIENT)
Dept: SLEEP MEDICINE | Facility: MEDICAL CENTER | Age: 70
End: 2025-04-30
Payer: MEDICARE

## 2025-04-30 ASSESSMENT — ENCOUNTER SYMPTOMS
NERVOUS/ANXIOUS: 1
NAUSEA: 0
PND: 0
LOSS OF CONSCIOUSNESS: 0
DEPRESSION: 1
PALPITATIONS: 0
ORTHOPNEA: 0
COUGH: 0
DIAPHORESIS: 1
SHORTNESS OF BREATH: 0
DIZZINESS: 0

## 2025-04-30 NOTE — TELEPHONE ENCOUNTER
Per chart review there are no outstanding labs or imaging for pt to complete prior to his appt with [facility].

## 2025-04-30 NOTE — TELEPHONE ENCOUNTER
Phone Number Called: [phone]    Call outcome: Spoke to patient regarding message below.    Message: Spoke with patient to bring in SD Card for upcoming sleep appointment on 5/2. Patient understood and will be bringing his machine in.

## 2025-04-30 NOTE — PROGRESS NOTES
"No chief complaint on file.         Subjective:   Edwin Hammond is a 68 y.o. male who presents today for follow-up.     Patient of Dr. Gray.  Current medical problems include coronary artery disease with NSTEMI presentation status post CABG by Dr. Davalos in 2023, complicated by postop atrial fibrillation without recurrence, hypertension, hyperlipidemia, obstructive sleep apnea treated with CPAP.   Last visit was March with Dr. Gray he wore an event monitor for 4 weeks showing no atrial fibrillation. He remains on anticoagulation.     Edwin feels feels \"run down\", like he has low energy, low motivation to do much or enjoy what he is doing.  He is not experiencing any enabling chest pressure, tightness, shortness of breath.  He did go to the emergency department at [facility] about 6 weeks ago for an episode of sweating which was one of the signs of his heart attack in the past.  He did not have the associated chest tightness that occurred with the sweating at the time he presented with his NSTEMI.  Apparently [facility] admitted him overnight his troponin was elevated but no significant rise and fall.  We have requested these records to review.  He continues to have problems with sweating first thing in the morning seems to be associated with some anxiety.     Edwin has not been using his CPAP machine since he had the surgery at December of last year.    Past Medical History:   Diagnosis Date    Asthma     CAD (coronary artery disease)     Chickenpox     Syri[place] measles     Hyperlipidemia     Hypertension     Influenza     Mumps     Sleep apnea          Family History   Problem Relation Age of Onset    Sleep Apnea Brother          Social History     Tobacco Use    Smoking status: Former     Current packs/day: 0.00     Average packs/day: 1 pack/day for 30.0 years (30.0 ttl pk-yrs)     Types: Cigarettes     Start date: 1971     Quit date: 2001     Years since quittin.3    " Smokeless tobacco: Never   Vaping Use    Vaping status: Never Used   Substance Use Topics    Alcohol use: No    Drug use: No         No Known Allergies      Current Outpatient Medications   Medication Sig    losartan (COZAAR) 25 MG Tab Take 1 Tablet by mouth every day.    apixaban (ELIQUIS) 5 MG Tab tablet Take 1 Tablet by mouth 2 times a day.    nitroglycerin (NITROSTAT) 0.4 MG SL Tab Place 1 Tablet under the tongue as needed for Chest Pain.    metoprolol SR (TOPROL XL) 50 MG TABLET SR 24 HR Take 1 Tablet by mouth every evening.    acetaminophen (TYLENOL) 500 MG Tab Take 2 Tablets by mouth every 6 hours.    atorvastatin (LIPITOR) 80 MG tablet Take 1 Tablet by mouth every evening.    Citalopram Hydrobromide 30 MG Cap Take 30 mg by mouth every day.    TESTOSTERONE CYPIONATE IM Inject 1 Each into the shoulder, thigh, or buttocks every 7 days. Indications: for low testosterone    amLODIPine (NORVASC) 5 MG Tab Take 10 mg by mouth every day.    Albuterol Sulfate 108 (90 BASE) MCG/ACT AEROSOL POWDER, BREATH ACTIVATED Inhale 2 Puffs 2 times a day as needed.    mometasone (ASMANEX) 220 MCG/INH inhaler Inhale 2 Puffs by mouth every day.         Review of Systems   Constitutional:  Positive for diaphoresis and malaise/fatigue.   Respiratory:  Negative for cough and shortness of breath.    Cardiovascular:  Negative for chest pain, palpitations, orthopnea, leg swelling and PND.   Gastrointestinal:  Negative for nausea.   Neurological:  Negative for dizziness and loss of consciousness.   Psychiatric/Behavioral:  Positive for depression. The patient is nervous/anxious.           Objective:   There were no vitals taken for this visit. There is no height or weight on file to calculate BMI.         Physical Exam  Vitals reviewed.   HENT:      Head: Normocephalic and atraumatic.   Cardiovascular:      Rate and Rhythm: Normal rate and regular rhythm.      Heart sounds: No murmur heard.  Pulmonary:      Effort: Pulmonary effort is  normal. No respiratory distress.      Breath sounds: No rales.   Musculoskeletal:      Right lower leg: No edema.      Left lower leg: No edema.   Skin:     General: Skin is warm.   Neurological:      General: No focal deficit present.      Mental Status: He is alert.      Gait: Gait normal.   Psychiatric:         Judgment: Judgment normal.       PROCEDURES PERFORMED:   12/5/23 Dr. Davalos  Coronary artery bypass grafting x3  Left internal mammary artery to left anterior descending artery  Reversed saphenous vein graft to obtuse marginal artery #3  Reversed saphenous vein graft to distal right coronary artery  Right coronary artery endarterectomy  Endo-vein procurement     Assessment:     No diagnosis found.       Medical Decision Making:  Today's Assessment / Plan:   Ischemic Heart Disease, Stable Angina  - S/P CABG Dec 2023  - may stop aspirin if he is taking long term NOAC  - high intensity statin: Atorva 80mg, request labs he had at local lab from 10/7/24  - beta blocker: change to metop succ 50mg  - Cardiac rehab: did not attend  - use nitro PRN. Request hospital records from last month. Close follow up to assess angina symptoms    DEREK  - restart CPAP as having daytime somnolence.     Post op AF  - no recurrence, though was asymptomatic in hospital. Once back on CPAP can discuss long term use of NOAC        No follow-ups on file.  Sooner if problems.

## 2025-05-01 ENCOUNTER — TELEPHONE (OUTPATIENT)
Dept: CARDIOLOGY | Facility: MEDICAL CENTER | Age: 70
End: 2025-05-01
Payer: MEDICARE

## 2025-05-01 DIAGNOSIS — I25.118 CORONARY ARTERY DISEASE OF NATIVE ARTERY OF NATIVE HEART WITH STABLE ANGINA PECTORIS (HCC): ICD-10-CM

## 2025-05-01 DIAGNOSIS — E78.5 DYSLIPIDEMIA: ICD-10-CM

## 2025-05-01 NOTE — PROGRESS NOTES
[facility] Follow-up Visit    Date of Visit: 5/2/2025     CC: Follow-up for DEREK management      HPI:  Edwin Hammond is a very pleasant 66 y.o. year old male former smoker (30 pack-years, quit in 2001), with a PMHx of DEREK, elevated BMI, CABG, NSTEMI, asthma, HTN, DSL  who presented to the [facility] for a regular follow up. Last seen in the office on 10/20/2023 with myself.     Patient presents for compliance.  Patient is states that he stopped using his CPAP machine after his NSTEMI and CABG.  He states that he has snoring, gasping, dry mouth, and daytime drowsiness to which he will take a nap for 2-3 hours.  He typically gets 6-8 hours of sleep at night and will awaken multiple times use the bathroom will not have any issues falling back to sleep.  He states that he would like to using CPAP.    DME provider: [facility]  Device: [employer] air sense 11 auto  Settings: CPAP 12 CWP  When: May 2022  Mask: Nasal pillows  Chin strap: Yes    Cleaning regimen: Couple times a week with soap and water.    Compliance:  Patient has not used his machine in over a year.      Sleep History:  PSG titration 6/19/2022-        Patient Active Problem List    Diagnosis Date Noted    DEREK 06/17/2016    BMI 36.0-36.9,adult 05/02/2025    Paroxysmal atrial fibrillation (HCC) 03/25/2024    Marijuana smoker 12/08/2023    S/P CABG x 3 12/08/2023    Coronary artery disease 12/07/2023    NSTEMI (non-ST elevated myocardial infarction) (HCC) 12/05/2023    Cough 12/05/2023    BMI 38 08/10/2017    Mild intermittent asthma without complication 05/06/2016    Dyslipidemia 05/06/2016    Essential hypertension 05/06/2016     Past Medical History:   Diagnosis Date    Asthma     CAD (coronary artery disease)     Chickenpox     [place] measles     Hyperlipidemia     Hypertension     Influenza     Mumps     Sleep apnea       Past Surgical History:   Procedure Laterality Date    MULTIPLE CORONARY ARTERY BYPASS ENDO VEIN HARVEST  12/8/2023     Procedure: CABG X 3, WITH ENDOSCOPIC VEIN PROCUREMENT;  Surgeon: Carolyn Davalos M.D.;  Location: SURGERY [facility];  Service: Cardiac Robotic    ECHOCARDIOGRAM, TRANSESOPHAGEAL, INTRAOPERATIVE  2023    Procedure: ECHOCARDIOGRAM, TRANSESOPHAGEAL, INTRAOPERATIVE;  Surgeon: Carolyn Davalos M.D.;  Location: SURGERY [facility];  Service: Cardiac Robotic    OTHER  [id number]    Mastoidectomy left ear     Family History   Problem Relation Age of Onset    Sleep Apnea Brother      Social History     Socioeconomic History    Marital status:      Spouse name: Not on file    Number of children: Not on file    Years of education: Not on file    Highest education level: Not on file   Occupational History    Not on file   Tobacco Use    Smoking status: Former     Current packs/day: 0.00     Average packs/day: 1 pack/day for 30.0 years (30.0 ttl pk-yrs)     Types: Cigarettes     Start date: 1971     Quit date: 2001     Years since quittin.3    Smokeless tobacco: Never   Vaping Use    Vaping status: Never Used   Substance and Sexual Activity    Alcohol use: Not Currently     Comment: occ.    Drug use: Yes     Types: Marijuana     Comment: gummies    Sexual activity: Not on file   Other Topics Concern    Not on file   Social History Narrative    Not on file     Social Drivers of Health     Financial Resource Strain: Not on file   Food Insecurity: Not on file   Transportation Needs: Not on file   Physical Activity: Not on file   Stress: Not on file   Social Connections: Not on file   Intimate Partner Violence: Not on file   Housing Stability: Not on file     Current Outpatient Medications   Medication Sig Dispense Refill    atorvastatin (LIPITOR) 80 MG tablet Take 1 Tablet by mouth every evening. 90 Tablet 3    losartan (COZAAR) 25 MG Tab Take 1 Tablet by mouth every day. 90 Tablet 3    apixaban (ELIQUIS) 5 MG Tab tablet Take 1 Tablet by mouth 2 times a day. 180 Tablet 3    nitroglycerin (NITROSTAT) 0.4  "MG SL Tab Place 1 Tablet under the tongue as needed for Chest Pain. 100 Tablet 0    metoprolol SR (TOPROL XL) 50 MG TABLET SR 24 HR Take 1 Tablet by mouth every evening. 180 Tablet 3    acetaminophen (TYLENOL) 500 MG Tab Take 2 Tablets by mouth every 6 hours. 30 Tablet 0    Citalopram Hydrobromide 30 MG Cap Take 30 mg by mouth every day.      TESTOSTERONE CYPIONATE IM Inject 1 Each into the shoulder, thigh, or buttocks every 7 days. Indications: for low testosterone      amLODIPine (NORVASC) 5 MG Tab Take 10 mg by mouth every day.      Albuterol Sulfate 108 (90 BASE) MCG/ACT AEROSOL POWDER, BREATH ACTIVATED Inhale 2 Puffs 2 times a day as needed.      mometasone (ASMANEX) 220 MCG/INH inhaler Inhale 2 Puffs by mouth every day.       No current facility-administered medications for this visit.      ALLERGIES: Patient has no known allergies.    ROS:  Constitutional: Denies fever, chills, sweats,  weight loss, fatigue  Cardiovascular: Denies chest pain, tightness, palpitations, swelling in legs/feet  Respiratory: Denies shortness of breath, cough, sputum, wheezing, painful breathing   Sleep: per HPI  Gastrointestinal: Denies  difficulty swallowing, nausea, abdominal pain, diarrhea, constipation, heartburn.  Musculoskeletal: Denies painful joints, sore muscles,       PHYSICAL EXAM:  BP (!) 140/84 (BP Location: Right arm, Patient Position: Sitting, BP Cuff Size: Adult)   Pulse 64   Ht 1.727 m (5' 8\")   Wt 110 kg (243 lb)   SpO2 98%   BMI 36.95 kg/m²   Appearance: Well-nourished, well-developed, no acute distress  Eyes:  No scleral icterus , EOMI  ENMT: No redness of the oropharynx  Lung auscultation:  No wheezes rhonchi rubs or rales  Cardiac: No murmurs, rubs, or gallops; regular rhythm, normal rate; no edema  Musculoskeletal:  Grossly normal; gait and station normal; digits and nails normal  Skin:  No rashes, petechiae, cyanosis  Neurologic: without focal signs; oriented to person, time, place, and purpose; " judgement intact  Psychiatric:  No depression, anxiety, agitation    Assessment and Plan:    The medical record was reviewed.    Diagnostic and titration nocturnal polysomnogram's, home sleep apnea tests, continuous nocturnal oximetry results, multiple sleep latency tests, and compliance reports reviewed.    Problem List Items Addressed This Visit          Pulmonary/Sleep Medicine Problems    DEREK    Sleep Apnea:    The pathophysiology of sleep anea and the increased risk of cardiovascular morbidity from untreated sleep apnea is discussed in detail with the patient.  Urged to avoid supine sleep, weight gain and alcoholic beverages since all of these can worsen sleep apnea. Cautioned against drowsy driving. If feeling sleepy while driving, pull over for a break/nap, rather than persist on the road, in the interest of own safety and that of others on the road.  The risks of untreated sleep apnea were discussed with the patient at length. Patients with sleep apnea are at increased risk of cardiovascular disease including coronary artery disease, systemic arterial hypertension, pulmonary arterial hypertension, cardiac arrythmias, and stroke.  Positive airway pressure will favorably impact many of the adverse conditions and effects provoked by sleep apnea.    Plan:    Compliance download was reviewed and discussed with the patient.  Patient has not been using his CPAP in over a year.  He has had a recent heart attack.  We discussed the risk factors of untreated sleep apnea.  Patient agrees to go back on the treatment.  Will see him back in 6 weeks for compliance.  Mask and supplies will be sent to his [employer] company.    - Order placed for mask and supplies to [facility] [place]  - Compliance was reinforced  - Clean supplies a least once a week with dish soap and water and air dry  - Recommended the patient against the use of Ozone , such as SoClean  - Recommended the patient change out supplies as recommended for best mask fit  and usage of the machine  - Equipment replacement schedule:  Mask cushion every month  Nasal pillows 2 times per month  Mask every 6 months  Head gear every 6 months  Tubing every 3 months  Ultra-fine filters 2 times per month  Foam filter every 6 months  Humidifier chamber every 6 months  Chin strap every 6 months    Has been advised to continue the current CPAP, clean equipment frequently, and get new mask and supplies as allowed by insurance and DME. Recommend an earlier appointment, if significant treatment barriers develop.    Advised patient to reach out via [employer] if any questions or concerns should arise.              Relevant Orders    DME Mask and Supplies       Other    BMI 36.0-36.9,adult     Other Visit Diagnoses         Need for vaccination        Relevant Orders    Pneumococcal Conjugate Vaccine 20-Valent (6 wks+)          Have advised the patient to follow up with the appropriate healthcare practitioners for all other medical problems and issues.    Return in about 6 weeks (around 6/13/2025), or if symptoms worsen or fail to improve, for compliance, with L[facility].    Please note portions of this record was created using voice recognition software. I have made every reasonable attempt to correct obvious errors, but I expect that there are errors of grammar and possibly content I did not discover before finalizing the note.    Time spent in record review prior to patient arrival, reviewing results, and in face-to-face encounter totaled 15 min.  __________  UDAY Melgoza  Pulmonary & Sleep Medicine  [facility]

## 2025-05-02 ENCOUNTER — APPOINTMENT (OUTPATIENT)
Dept: CARDIOLOGY | Facility: MEDICAL CENTER | Age: 70
End: 2025-05-02
Attending: PHYSICIAN ASSISTANT
Payer: MEDICARE

## 2025-05-02 ENCOUNTER — OFFICE VISIT (OUTPATIENT)
Dept: CARDIOLOGY | Facility: MEDICAL CENTER | Age: 70
End: 2025-05-02
Payer: MEDICARE

## 2025-05-02 ENCOUNTER — OFFICE VISIT (OUTPATIENT)
Dept: SLEEP MEDICINE | Facility: MEDICAL CENTER | Age: 70
End: 2025-05-02
Attending: PHYSICIAN ASSISTANT
Payer: MEDICARE

## 2025-05-02 VITALS
WEIGHT: 243 LBS | RESPIRATION RATE: 16 BRPM | SYSTOLIC BLOOD PRESSURE: 138 MMHG | OXYGEN SATURATION: 95 % | HEART RATE: 80 BPM | BODY MASS INDEX: 36.83 KG/M2 | HEIGHT: 68 IN | DIASTOLIC BLOOD PRESSURE: 76 MMHG

## 2025-05-02 VITALS
HEIGHT: 68 IN | SYSTOLIC BLOOD PRESSURE: 140 MMHG | HEART RATE: 64 BPM | OXYGEN SATURATION: 98 % | DIASTOLIC BLOOD PRESSURE: 84 MMHG | BODY MASS INDEX: 36.83 KG/M2 | WEIGHT: 243 LBS

## 2025-05-02 DIAGNOSIS — Z23 NEED FOR VACCINATION: ICD-10-CM

## 2025-05-02 DIAGNOSIS — I10 ESSENTIAL HYPERTENSION: ICD-10-CM

## 2025-05-02 DIAGNOSIS — I25.10 CORONARY ARTERY DISEASE INVOLVING NATIVE CORONARY ARTERY OF NATIVE HEART WITHOUT ANGINA PECTORIS: ICD-10-CM

## 2025-05-02 DIAGNOSIS — D68.69 HYPERCOAGULABLE STATE DUE TO PAROXYSMAL ATRIAL FIBRILLATION (HCC): ICD-10-CM

## 2025-05-02 DIAGNOSIS — I48.0 HYPERCOAGULABLE STATE DUE TO PAROXYSMAL ATRIAL FIBRILLATION (HCC): ICD-10-CM

## 2025-05-02 DIAGNOSIS — E78.5 DYSLIPIDEMIA: ICD-10-CM

## 2025-05-02 DIAGNOSIS — G47.33 OSA ON CPAP: ICD-10-CM

## 2025-05-02 DIAGNOSIS — Z95.1 S/P CABG X 3: ICD-10-CM

## 2025-05-02 DIAGNOSIS — I48.0 PAROXYSMAL ATRIAL FIBRILLATION (HCC): ICD-10-CM

## 2025-05-02 PROCEDURE — 99212 OFFICE O/P EST SF 10 MIN: CPT

## 2025-05-02 PROCEDURE — 3078F DIAST BP <80 MM HG: CPT

## 2025-05-02 PROCEDURE — 99213 OFFICE O/P EST LOW 20 MIN: CPT | Mod: 27

## 2025-05-02 PROCEDURE — 99214 OFFICE O/P EST MOD 30 MIN: CPT

## 2025-05-02 PROCEDURE — 90471 IMMUNIZATION ADMIN: CPT

## 2025-05-02 PROCEDURE — 3079F DIAST BP 80-89 MM HG: CPT

## 2025-05-02 PROCEDURE — 3075F SYST BP GE 130 - 139MM HG: CPT

## 2025-05-02 PROCEDURE — 3077F SYST BP >= 140 MM HG: CPT

## 2025-05-02 PROCEDURE — 99213 OFFICE O/P EST LOW 20 MIN: CPT

## 2025-05-02 RX ORDER — ATORVASTATIN CALCIUM 80 MG/1
80 TABLET, FILM COATED ORAL EVERY EVENING
Qty: 90 TABLET | Refills: 3 | Status: SHIPPED | OUTPATIENT
Start: 2025-05-02

## 2025-05-02 ASSESSMENT — ENCOUNTER SYMPTOMS
HEADACHES: 0
SHORTNESS OF BREATH: 0
ORTHOPNEA: 0
DYSPNEA ON EXERTION: 0
PND: 0
PALPITATIONS: 0
DIZZINESS: 0
SYNCOPE: 0
LIGHT-HEADEDNESS: 0
NEAR-SYNCOPE: 0

## 2025-05-02 ASSESSMENT — FIBROSIS 4 INDEX
FIB4 SCORE: 0.6
FIB4 SCORE: 0.6

## 2025-05-02 NOTE — ASSESSMENT & PLAN NOTE
Sleep Apnea:    The pathophysiology of sleep anea and the increased risk of cardiovascular morbidity from untreated sleep apnea is discussed in detail with the patient.  Urged to avoid supine sleep, weight gain and alcoholic beverages since all of these can worsen sleep apnea. Cautioned against drowsy driving. If feeling sleepy while driving, pull over for a break/nap, rather than persist on the road, in the interest of own safety and that of others on the road.  The risks of untreated sleep apnea were discussed with the patient at length. Patients with sleep apnea are at increased risk of cardiovascular disease including coronary artery disease, systemic arterial hypertension, pulmonary arterial hypertension, cardiac arrythmias, and stroke.  Positive airway pressure will favorably impact many of the adverse conditions and effects provoked by sleep apnea.    Plan:    Compliance download was reviewed and discussed with the patient.  Patient has not been using his CPAP in over a year.  He has had a recent heart attack.  We discussed the risk factors of untreated sleep apnea.  Patient agrees to go back on the treatment.  Will see him back in 6 weeks for compliance.  Mask and supplies will be sent to his [employer] company.    - Order placed for mask and supplies to [facility]  - Compliance was reinforced  - Clean supplies a least once a week with dish soap and water and air dry  - Recommended the patient against the use of Ozone , such as SoClean  - Recommended the patient change out supplies as recommended for best mask fit and usage of the machine  - Equipment replacement schedule:  Mask cushion every month  Nasal pillows 2 times per month  Mask every 6 months  Head gear every 6 months  Tubing every 3 months  Ultra-fine filters 2 times per month  Foam filter every 6 months  Humidifier chamber every 6 months  Chin strap every 6 months    Has been advised to continue the current CPAP, clean equipment frequently, and get  new mask and supplies as allowed by insurance and DME. Recommend an earlier appointment, if significant treatment barriers develop.    Advised patient to reach out via [employer] if any questions or concerns should arise.

## 2025-05-02 NOTE — PROGRESS NOTES
Chief Complaint   Patient presents with    Follow-Up     FV DX:Essential hypertension          Subjective:   Edwin Hammond is a 69 y.o. male who presents today for follow-up.     Patient of Dr. Russo.  Current medical problems include  NSTEMI presentation status post CABG by Dr. Davalos in December 2023, complicated by postop atrial fibrillation without recurrence, hypertension, hyperlipidemia, obstructive sleep apnea treated with CPAP. Their last clinic visit was 10/24/39826 with Anjana Schwarz.    Today's visit:  Patient reports that he is doing overall well from a cardiac perspective. He has no chest pain, shortness of breath, edema, orthopnea, PND, dizziness/lightheadedness, or palpitations. He reports that since his last follow up he has been on his depression medication and stopped smoking marijuana as it was interfering with the medication for his depression. He is feeling well and better mentally from last visit. He is taking his medications as prescribed. He is active at work and denies any limitations with exertion. He has follow up with sleep medicine today for DEREK. He reports he recently got his lab work done but I will need to request those results. He has not had to take any nitroglycerin.     Cardiovascular Risk Factors:  1. Smoking status: Former smoker  2. Type II Diabetes Mellitus: No   Lab Results   Component Value Date/Time    HBA1C 5.6 12/06/2023 05:34 AM     3. Hypertension: Yes  4. Dyslipidemia: Yes   Cholesterol,Tot   Date Value Ref Range Status   12/05/2023 195 100 - 199 mg/dL Final     LDL   Date Value Ref Range Status   12/05/2023 121 (H) <100 mg/dL Final     HDL   Date Value Ref Range Status   12/05/2023 42 >=40 mg/dL Final     Triglycerides   Date Value Ref Range Status   12/05/2023 159 (H) 0 - 149 mg/dL Final     Past Medical History:   Diagnosis Date    Asthma     CAD (coronary artery disease)     Chickenpox     I[place] measles     Hyperlipidemia     Hypertension      Influenza     Mumps     Sleep apnea          Family History   Problem Relation Age of Onset    Sleep Apnea Brother          Social History     Tobacco Use    Smoking status: Former     Current packs/day: 0.00     Average packs/day: 1 pack/day for 30.0 years (30.0 ttl pk-yrs)     Types: Cigarettes     Start date: 1971     Quit date: 2001     Years since quittin.3    Smokeless tobacco: Never   Vaping Use    Vaping status: Never Used   Substance Use Topics    Alcohol use: Not Currently     Comment: occ.    Drug use: Yes     Types: Marijuana     Comment: gummies         No Known Allergies      Current Outpatient Medications   Medication Sig    atorvastatin (LIPITOR) 80 MG tablet Take 1 Tablet by mouth every evening.    losartan (COZAAR) 25 MG Tab Take 1 Tablet by mouth every day.    apixaban (ELIQUIS) 5 MG Tab tablet Take 1 Tablet by mouth 2 times a day.    nitroglycerin (NITROSTAT) 0.4 MG SL Tab Place 1 Tablet under the tongue as needed for Chest Pain.    metoprolol SR (TOPROL XL) 50 MG TABLET SR 24 HR Take 1 Tablet by mouth every evening.    acetaminophen (TYLENOL) 500 MG Tab Take 2 Tablets by mouth every 6 hours.    Citalopram Hydrobromide 30 MG Cap Take 30 mg by mouth every day.    TESTOSTERONE CYPIONATE IM Inject 1 Each into the shoulder, thigh, or buttocks every 7 days. Indications: for low testosterone    amLODIPine (NORVASC) 5 MG Tab Take 10 mg by mouth every day.    Albuterol Sulfate 108 (90 BASE) MCG/ACT AEROSOL POWDER, BREATH ACTIVATED Inhale 2 Puffs 2 times a day as needed.    mometasone (ASMANEX) 220 MCG/INH inhaler Inhale 2 Puffs by mouth every day.         Review of Systems   Constitutional: Negative for malaise/fatigue.   Cardiovascular:  Negative for chest pain, dyspnea on exertion, leg swelling, near-syncope, orthopnea, palpitations, paroxysmal nocturnal dyspnea and syncope.   Respiratory:  Negative for shortness of breath.    Neurological:  Negative for dizziness, headaches and  "light-headedness.           Objective:   /76 (BP Location: Left arm, Patient Position: Sitting, BP Cuff Size: Adult long)   Pulse 80   Resp 16   Ht 1.727 m (5' 8\")   Wt 110 kg (243 lb)   SpO2 95%  Body mass index is 36.95 kg/m².         Physical Exam  Vitals reviewed.   Constitutional:       General: He is not in acute distress.     Appearance: Normal appearance.   HENT:      Head: Normocephalic and atraumatic.   Cardiovascular:      Rate and Rhythm: Normal rate and regular rhythm.      Pulses: Normal pulses.      Heart sounds: No murmur heard.  Pulmonary:      Effort: Pulmonary effort is normal. No respiratory distress.      Breath sounds: Normal breath sounds.   Musculoskeletal:      Right lower leg: No edema.      Left lower leg: No edema.   Neurological:      Mental Status: He is alert and oriented to person, place, and time.      Gait: Gait normal.   Psychiatric:         Behavior: Behavior normal.           Lab Results   Component Value Date/Time    SODIUM 133 (L) 12/13/2023 01:00 AM    POTASSIUM 4.4 12/13/2023 01:00 AM    CHLORIDE 99 12/13/2023 01:00 AM    CO2 26 12/13/2023 01:00 AM    GLUCOSE 99 12/13/2023 01:00 AM    BUN 16 12/13/2023 01:00 AM    CREATININE 1.02 12/13/2023 01:00 AM      Lab Results   Component Value Date/Time    WBC 12.0 (H) 12/13/2023 01:00 AM    RBC 4.31 (L) 12/13/2023 01:00 AM    HEMOGLOBIN 13.3 (L) 12/13/2023 01:00 AM    HEMATOCRIT 40.1 (L) 12/13/2023 01:00 AM    MCV 93.0 12/13/2023 01:00 AM    MCH 30.9 12/13/2023 01:00 AM    MCHC 33.2 12/13/2023 01:00 AM    MPV 9.2 12/13/2023 01:00 AM    NEUTSPOLYS 58.70 12/07/2023 12:59 PM    LYMPHOCYTES 30.00 12/07/2023 12:59 PM    MONOCYTES 8.90 12/07/2023 12:59 PM    EOSINOPHILS 1.30 12/07/2023 12:59 PM    BASOPHILS 0.80 12/07/2023 12:59 PM      Lab Results   Component Value Date/Time    CHOLSTRLTOT 195 12/05/2023 11:33 PM     (H) 12/05/2023 11:33 PM    HDL 42 12/05/2023 11:33 PM    TRIGLYCERIDE 159 (H) 12/05/2023 11:33 PM     " "  Lab Results   Component Value Date/Time    TROPONINT 167 (H) 12/06/2023 0638     No results found for: \"NTPROBNP\"  Assessment:   1. Coronary artery disease involving native coronary artery of native heart without angina pectoris  - Lipid Profile; Future  - Lipoprotein (a); Future  - Comp Metabolic Panel; Future    2. S/P CABG x 3  - Lipid Profile; Future  - Lipoprotein (a); Future  - Comp Metabolic Panel; Future  - atorvastatin (LIPITOR) 80 MG tablet; Take 1 Tablet by mouth every evening.  Dispense: 90 Tablet; Refill: 3    3. Dyslipidemia  - Lipid Profile; Future  - Lipoprotein (a); Future  - Comp Metabolic Panel; Future    4. Essential hypertension    5. Paroxysmal atrial fibrillation (HCC)    6. Hypercoagulable state due to paroxysmal atrial fibrillation (HCC)    7. DEREK        Medical Decision Making:  Today's Assessment / Plan:   CAD HX CABG x3 2022  -Stable, denies any angina  -Continue metoprolol 50 mg every evening  -Continue atorvastatin 80 mg every evening  -Continue eliquis 5 mg twice a day  -Recommend at least 150min of moderate-intensity aerobic activity per week  -Continue with increased activity and healthy diet and lifestyle, encouraged continue marijuana cessation    Hyperlipidemia  -Most recent LDL 82  -Continue atorvastatin 40   -Goal of less than 70  -Check lipid panel in 12 months, patient reports just got labs done, if not will repeat lipid panel    Hypertension  - Good control  - continue losartan 25 mg daily  -continue metoprolol 50 mg every evening  - goal < 130/80    Paroxysmal Atrial Fibrillation  Hypercoagulable state due to atrial fibrillation  -Continue eliquis 5 mg twice a day for stroke prevention  -Continue metoprolol 50 mg every evening for rate control  -denies reoccurrence since CABG, discussed options for long term monitoring to try and get off of eliquis but patient tolerating medication and wants to stay on blood thinner at this time    DEREK  -Continue to follow up with sleep " medicine and recommendations    Return in about 6 months (around 11/2/2025) for Ofelia FRYE.  Sooner if problems.    LACHELLE Lopez.

## 2025-05-02 NOTE — Clinical Note
Patient said he got labs done recently at [facility] recently. Can you please request most recent labs.  Thanks!

## 2025-05-02 NOTE — ASSESSMENT & PLAN NOTE
Blood pressure today in the office is 140/84.  Patient states he has not been using his blood pressure medications today, due to not having any refills.  He states that he was able to  refills before this appointment.  --Provide patient with ED parameters

## 2025-05-05 ENCOUNTER — TELEPHONE (OUTPATIENT)
Dept: CARDIOLOGY | Facility: MEDICAL CENTER | Age: 70
End: 2025-05-05
Payer: MEDICARE

## 2025-05-05 DIAGNOSIS — I48.0 PAROXYSMAL ATRIAL FIBRILLATION (HCC): ICD-10-CM

## 2025-05-05 DIAGNOSIS — I10 ESSENTIAL HYPERTENSION: ICD-10-CM

## 2025-05-05 DIAGNOSIS — E78.5 DYSLIPIDEMIA: ICD-10-CM

## 2025-05-05 DIAGNOSIS — Z79.01 BLOOD THINNED DUE TO LONG-TERM ANTICOAGULANT USE: ICD-10-CM

## 2025-05-05 NOTE — TELEPHONE ENCOUNTER
----- Message from Nurse Practitioner LEIGH Cooper sent at 5/2/2025 10:18 AM PDT -----  Patient said he got labs done recently at [facility] recently. Can you please request most recent labs.    Thanks!

## 2025-05-06 NOTE — TELEPHONE ENCOUNTER
LACHELLE Lopez.  You44 minutes ago (1:53 PM)     Patient did not get a CMP or lipid profile done recently. Please order those two including lipoprotein a and call patient to see if he needs labs mailed  to him as he goes to the [facility] clinic.    Thanks!

## 2025-05-06 NOTE — TELEPHONE ENCOUNTER
Phone Number Called: [phone]     Call outcome: Spoke to patient regarding message below.    Message: Called to inform patient about lab orders. CBC added for eliquis monitoring. Pt notified that they have to fast for blood work. Pt verbalized understanding. Orders faxed to [facility] at [phone]. Confirmation scanned to [facility]. Orders also mailed to the patient.

## 2025-05-08 LAB
CHOLEST SERPL-MCNC: 178 MG/DL
HDLC SERPL-MCNC: 50 MG/DL
LDLC SERPL CALC-MCNC: 104 MG/DL
TRIGL SERPL-MCNC: 114 MG/DL

## 2025-05-20 DIAGNOSIS — E78.5 DYSLIPIDEMIA: ICD-10-CM

## 2025-05-20 DIAGNOSIS — I25.10 CORONARY ARTERY DISEASE INVOLVING NATIVE CORONARY ARTERY OF NATIVE HEART WITHOUT ANGINA PECTORIS: ICD-10-CM

## 2025-05-20 DIAGNOSIS — Z95.1 S/P CABG X 3: ICD-10-CM

## 2025-05-21 ENCOUNTER — RESULTS FOLLOW-UP (OUTPATIENT)
Dept: CARDIOLOGY | Facility: MEDICAL CENTER | Age: 70
End: 2025-05-21
Payer: MEDICARE

## 2025-05-21 ENCOUNTER — RESULTS FOLLOW-UP (OUTPATIENT)
Dept: CARDIOLOGY | Facility: MEDICAL CENTER | Age: 70
End: 2025-05-21

## 2025-05-21 DIAGNOSIS — Z95.1 S/P CABG X 3: ICD-10-CM

## 2025-05-21 DIAGNOSIS — I25.10 CORONARY ARTERY DISEASE INVOLVING NATIVE CORONARY ARTERY OF NATIVE HEART WITHOUT ANGINA PECTORIS: ICD-10-CM

## 2025-05-21 DIAGNOSIS — E78.5 DYSLIPIDEMIA: Primary | ICD-10-CM

## 2025-05-28 RX ORDER — EZETIMIBE 10 MG/1
10 TABLET ORAL DAILY
Qty: 90 TABLET | Refills: 3 | Status: SHIPPED | OUTPATIENT
Start: 2025-05-28

## 2025-06-21 NOTE — INTERVAL H&P NOTE
H&P reviewed. The patient was examined and there are no changes to the H&P    TTE: CONCLUSIONS  No prior study is available for comparison.   Mild concentric left ventricular hypertrophy.  Normal left ventricular systolic function.  The left ventricular ejection fraction is visually estimated to be 55%.  Normal right ventricular size and systolic function.  No significant valvular abnormalities.     Carotid US:  CONCLUSIONS   Mild plaquing of common carotid artery bifurcations witout evidence of    significant stenoses     You can access the FollowMyHealth Patient Portal offered by [facility] by registering at the following website: [email or web address]. By joining "[employer]’s FollowMyHealth portal, you will also be able to view your health information using other applications (apps) compatible with our system.

## 2025-08-27 ENCOUNTER — TELEPHONE (OUTPATIENT)
Dept: CARDIOLOGY | Facility: MEDICAL CENTER | Age: 70
End: 2025-08-27
Payer: MEDICARE

## (undated) DEVICE — KIT INTROPERCUTANEOUS SHEATH - 8.5 FR W/MAX BARRIER AND BIOPATCH  (5/CA)

## (undated) DEVICE — GLOVE SIZE 7.0 SURGEON ACCELERATOR FREE GREEN (50PR/BX 4BX/CA)

## (undated) DEVICE — DRESSING SURGICAL NUKNIT 6X9 (10EA/BX)

## (undated) DEVICE — KIT RADIAL ARTERY 20GA W/MAX BARRIER AND BIOPATCH  (5EA/CA) #10740 IS FOR THE SET RADIAL ARTERIAL

## (undated) DEVICE — BAG RESUSCITATION DISPOSABLE - WITH MASK (10 EA/CA)

## (undated) DEVICE — TUBE NG SALEM SUMP 16FR (50EA/CA)

## (undated) DEVICE — SUTURE 0 VICRYL PLUS CTX - 36 INCH (36/BX)

## (undated) DEVICE — KIT ENDOHARVEST SYSTEM 8 - MUST ORDER 5 AT A TIME

## (undated) DEVICE — DRAPE MAYO STAND - (30/CA)

## (undated) DEVICE — GOWN WARMING STANDARD FLEX - (30/CA)

## (undated) DEVICE — SUTURE 7-0 PROLENE BV175-6 (36PK/BX)

## (undated) DEVICE — CANISTER SUCTION 3000ML MECHANICAL FILTER AUTO SHUTOFF MEDI-VAC NONSTERILE LF DISP  (40EA/CA)

## (undated) DEVICE — PACK VEIN - (19/CA)

## (undated) DEVICE — GLOVE BIOGEL INDICATOR SZ 7.5 SURGICAL PF LTX - (50PR/BX 4BX/CA)

## (undated) DEVICE — SYSTEM PREVENA DRESSING CUSTOMIZABLE (5EA/CA)

## (undated) DEVICE — TUBING CLEARLINK DUO-VENT - C-FLO (48EA/CA)

## (undated) DEVICE — D-5-W INJ. 500 ML - (24EA/CA)

## (undated) DEVICE — CATHETER THERMALDILUTION SWAN - (5EA/CA)

## (undated) DEVICE — BLOWER/MISTER (5EA/PK)

## (undated) DEVICE — BLADE SURGICAL #15 - (50/BX 3BX/CA)

## (undated) DEVICE — COVER LIGHT HANDLE ALC PLUS DISP (18EA/BX)

## (undated) DEVICE — SUCTION INSTRUMENT YANKAUER BULBOUS TIP W/O VENT (50EA/CA)

## (undated) DEVICE — CONNECTOR HUBLESS DRAINAGE - ONE WAY (20/BX)

## (undated) DEVICE — TOWEL STOP TIMEOUT SAFETY FLAG (40EA/CA)

## (undated) DEVICE — LEAD PACING TEMP MYO - (12/BX)

## (undated) DEVICE — SENSOR OXIMETER ADULT SPO2 RD SET (20EA/BX)

## (undated) DEVICE — SUTURE NONABSORBABLE DOUBLEWIRE 8 CCS-2 14IN (12EA/PK)

## (undated) DEVICE — SET EXTENSION WITH 2 PORTS (48EA/CA) ***PART #2C8610 IS A SUBSTITUTE*****

## (undated) DEVICE — SUTURE 4-0 30CM STRATAFIX SPIRAL PS-2 (12EA/BX)

## (undated) DEVICE — SUTURE 0 ETHIBOND MO6 C/R - (12/BX) 8-18 INCH ETHICON

## (undated) DEVICE — INSERT STEALTH #5 - (10/BX)

## (undated) DEVICE — GLOVE BIOGEL PI ORTHO SZ 7.5 PF LF (40PR/BX)

## (undated) DEVICE — SET BIFURCATED BLOOD - (48EA/CS)

## (undated) DEVICE — PAD LAP STERILE 18 X 18 - (5/PK 40PK/CA)

## (undated) DEVICE — DRAIN SILICONE CLOSED WOUND SUCTION CHANNEL 24FR ROUND HUBLESS (10/CA)

## (undated) DEVICE — BLADE STERNUM SAW SURGICAL 32.0 X 6.4 MM STERILE (1/EA)

## (undated) DEVICE — SOD. CHL. INJ. 0.9% 1000 ML - (14EA/CA 60CA/PF)

## (undated) DEVICE — BLADE BEAVER 6900 MINI SHARP ALL AROUND (20/CA)

## (undated) DEVICE — SET FLUID WARMING STANDARD FLOW - (10/CA)

## (undated) DEVICE — CONTAINER SPECIMEN BAG OR - STERILE 4 OZ W/LID (100EA/CA)

## (undated) DEVICE — CATHETER IV 14 GA X 2 ---SURG.& SDS ONLY---(200EA/CA)

## (undated) DEVICE — SUTURE 2-0 ETHIBOND SH D/A 36 (36PK/BX)"

## (undated) DEVICE — RETRACTOR OFF PUMP OCTO ONLY - 10/BX

## (undated) DEVICE — TRAY SURESTEP FOLEY TEMP SENSING 16FR (10EA/CA) ORDER  #18764 FOR TEMP FOLEY ONLY

## (undated) DEVICE — LACTATED RINGERS INJ 1000 ML - (14EA/CA 60CA/PF)

## (undated) DEVICE — GOWN SURGEONS LARGE - (32/CA)

## (undated) DEVICE — SODIUM CHL. INJ. 0.9% 500ML (24EA/CA 50CA/PF)

## (undated) DEVICE — MICRODRIP PRIMARY VENTED 60 (48EA/CA) THIS WAS PART #2C8428 WHICH WAS DISCONTINUED

## (undated) DEVICE — SPONGE XRAY 8X4 STERL. 12PL - (10EA/TY 80TY/CA)

## (undated) DEVICE — ELECTRODE DUAL RETURN W/ CORD - (50/PK)

## (undated) DEVICE — SUTURE 2-0 VICRYL PLUS CT-1 36 (36PK/BX)"

## (undated) DEVICE — DECANTER FLD BLS - (50/CA)

## (undated) DEVICE — SUTURE 6-0 PROLENE RB-2 D/A 30 (36PK/BX)"

## (undated) DEVICE — TOWELS CLOTH SURGICAL - (4/PK 20PK/CA)

## (undated) DEVICE — TRANSDUCER BIFURCATED MONITORING KIT (10EA/CA)

## (undated) DEVICE — STOPCOCK MALE 4-WAY - (50/CA)

## (undated) DEVICE — SYSTEM CHEST DRAIN ADULT/PEDS W/AUTO TRANSFUSION CAPABILITY SAHARA (6EA/CA)

## (undated) DEVICE — KIT SURGIFLO W/OUT THROMBIN - (6EA/CA)

## (undated) DEVICE — SET LEADWIRE 5 LEAD BEDSIDE DISPOSABLE ECG (1SET OF 5/EA)

## (undated) DEVICE — SUTURE OHS

## (undated) DEVICE — TUBING INSUFFLATION - (10/BX)

## (undated) DEVICE — SPRING BULLDOG 1/2 FORCE BLUE - (10/BX)

## (undated) DEVICE — SUTURE 5 SURGICAL STEEL V-40 - (12/BX) CCS CURRENT

## (undated) DEVICE — KIT TOURNIQUET DLP (40EA/PK)

## (undated) DEVICE — SYS DLV COST CLS RM TEMP - INJECTATE (CO-SET II) (10EA/CA)

## (undated) DEVICE — PUNCH 4MM SHRP CNCL TIP DL - (6/BX)

## (undated) DEVICE — PACK CV DRAPING/BASIN 2PART - (1/CA)

## (undated) DEVICE — TRAY MULTI-LUMEN 7FR PRESSURE W/MAX BARRIER AND BIOPATCH - (5/CA)